# Patient Record
Sex: FEMALE | Race: BLACK OR AFRICAN AMERICAN | Employment: FULL TIME | URBAN - METROPOLITAN AREA
[De-identification: names, ages, dates, MRNs, and addresses within clinical notes are randomized per-mention and may not be internally consistent; named-entity substitution may affect disease eponyms.]

---

## 2017-03-21 ENCOUNTER — TELEPHONE (OUTPATIENT)
Dept: ENDOCRINOLOGY | Age: 32
End: 2017-03-21

## 2017-03-21 NOTE — TELEPHONE ENCOUNTER
Called patient concerning high blood sugars. Patient is 6 weeks pregnant. She states she is a type 2 diabetic. Was on metformin-glyburide combination and [place] and [place] which was working well. She stated her A1C was running 7.0 unitl Feb and now is 8.5. Her blood sugars are as follows [id number] 2: after eating 6pm 318 after eating 8:20pm 323 11:22pm 276 and 0812 215. She is not taking Toujeo 16 units every night. She is a  New patient and has an appointment tomorrow.

## 2017-03-21 NOTE — TELEPHONE ENCOUNTER
Patient called stating she has new patient tomorrow. Her blood sugar has run up into 300's. She is pregnant and diabetic. She is very concerned.   Please call patient [phone]

## 2017-03-21 NOTE — TELEPHONE ENCOUNTER
Patient called and instructed to increase Tuojeo to 30units for tonight. Reminded patient to bring blood sugar log and meter with her tomorrow to her appointment.

## 2017-03-22 ENCOUNTER — OFFICE VISIT (OUTPATIENT)
Dept: ENDOCRINOLOGY | Age: 32
End: 2017-03-22

## 2017-03-22 VITALS
TEMPERATURE: 98.2 F | HEIGHT: 68 IN | RESPIRATION RATE: 16 BRPM | SYSTOLIC BLOOD PRESSURE: 116 MMHG | HEART RATE: 80 BPM | DIASTOLIC BLOOD PRESSURE: 59 MMHG | WEIGHT: 184 LBS | BODY MASS INDEX: 27.89 KG/M2

## 2017-03-22 DIAGNOSIS — E11.65 UNCONTROLLED TYPE 2 DIABETES MELLITUS WITH HYPERGLYCEMIA, UNSPECIFIED LONG TERM INSULIN USE STATUS: ICD-10-CM

## 2017-03-22 DIAGNOSIS — O24.419 GESTATIONAL DIABETES MELLITUS (GDM) IN FIRST TRIMESTER, GESTATIONAL DIABETES METHOD OF CONTROL UNSPECIFIED: Primary | ICD-10-CM

## 2017-03-22 LAB — HBA1C MFR BLD HPLC: 7.9 %

## 2017-03-22 RX ORDER — METHYLDOPA 250 MG/1
TABLET, FILM COATED ORAL
Refills: 3 | COMMUNITY
Start: 2017-03-19 | End: 2017-03-28 | Stop reason: SDUPTHER

## 2017-03-22 RX ORDER — BLOOD SUGAR DIAGNOSTIC
STRIP MISCELLANEOUS
Refills: 3 | COMMUNITY
Start: 2017-03-16 | End: 2017-03-22 | Stop reason: SDUPTHER

## 2017-03-22 RX ORDER — BLOOD SUGAR DIAGNOSTIC
STRIP MISCELLANEOUS
Qty: 200 STRIP | Refills: 4 | Status: SHIPPED | OUTPATIENT
Start: 2017-03-22 | End: 2018-04-08 | Stop reason: SDUPTHER

## 2017-03-22 RX ORDER — METFORMIN HYDROCHLORIDE 500 MG/1
TABLET, FILM COATED, EXTENDED RELEASE ORAL
Qty: 60 TAB | Refills: 6 | Status: SHIPPED | OUTPATIENT
Start: 2017-03-22 | End: 2017-04-20 | Stop reason: ALTCHOICE

## 2017-03-22 RX ORDER — PEN NEEDLE, DIABETIC 31 GX3/16"
NEEDLE, DISPOSABLE MISCELLANEOUS
Qty: 100 PEN NEEDLE | Refills: 6 | Status: SHIPPED | OUTPATIENT
Start: 2017-03-22 | End: 2017-09-10 | Stop reason: SDUPTHER

## 2017-03-22 RX ORDER — INSULIN GLARGINE 300 U/ML
INJECTION, SOLUTION SUBCUTANEOUS
Refills: 5 | COMMUNITY
Start: 2017-03-16 | End: 2017-03-22

## 2017-03-22 RX ORDER — METHOCARBAMOL 500 MG/1
TABLET, FILM COATED ORAL
Refills: 0 | COMMUNITY
Start: 2017-02-03 | End: 2017-03-22

## 2017-03-22 RX ORDER — INSULIN LISPRO 100 [IU]/ML
INJECTION, SOLUTION INTRAVENOUS; SUBCUTANEOUS
Qty: 1 PACKAGE | Refills: 6 | Status: SHIPPED | OUTPATIENT
Start: 2017-03-22 | End: 2017-06-09 | Stop reason: SDUPTHER

## 2017-03-22 NOTE — PROGRESS NOTES
HISTORY OF PRESENT ILLNESS  Karo Farmer is a 32 y.o. female. HPI  Patient is here for initial visit for GDM,   She is 6 weeks pregnant     She  is referred by Dr. Teagan Kimbrough    H/o DM 2  For 12 years   Usually controlled well until she got pregnant  She says       pcp ( Dr. Tessie Jane ) stopped  Medications - janumet xr she said after pregnancy was found out   Since, pt has noted severely high sugars and is very concerned       [place] one    Para 0   0 and she is through 6 weeks of second gestation. LMP : 2017   ELISEO : 2017     Current monitoring regimen: home blood tests - 3 times daily  Home blood sugar records: trend: fluctuating a lot  Any episodes of hypoglycemia? no      Current diabetic medications include got onto toujeo and stopped janmet xr recently . Eye exam current (within one year): yes  Weight trend: fluctuating a bit  Prior visit with dietician: no  Current diet: \"unhealthy\" diet in general  Current exercise: no regular exercise      Mother is diabetic, on insulin , type not sure         Past Medical History:   Diagnosis Date    Gestational diabetes        Social History     Social History    Marital status: UNKNOWN     Spouse name: N/A    Number of children: N/A    Years of education: N/A     Occupational History    Not on file. Social History Main Topics    Smoking status: Never Smoker    Smokeless tobacco: Not on file    Alcohol use No    Drug use: No    Sexual activity: Not on file     Other Topics Concern    Not on file     Social History Narrative    No narrative on file       History reviewed. No pertinent family history. Review of Systems   Constitutional: Negative. HENT: Negative. Eyes: Negative for pain and redness. Respiratory: Negative. Cardiovascular: Negative for chest pain, palpitations and leg swelling. Gastrointestinal: Negative. Negative for constipation. Genitourinary: Negative.     Musculoskeletal: Negative for myalgias. Skin: Negative. Neurological: Negative. Endo/Heme/Allergies: Negative. Psychiatric/Behavioral: Negative for depression and memory loss. The patient does not have insomnia. Physical Exam   Constitutional: She is oriented to person, place, and time. She appears well-developed and well-nourished. HENT:   Head: Normocephalic. Eyes: Conjunctivae and EOM are normal. Pupils are equal, round, and reactive to light. Neck: Normal range of motion. Neck supple. No JVD present. No tracheal deviation present. No thyromegaly present. Cardiovascular: Normal rate, regular rhythm and normal heart sounds. No murmur heard. Pulmonary/Chest: Breath sounds normal.   Abdominal: Soft. Bowel sounds are normal.   Musculoskeletal: Normal range of motion. Lymphadenopathy:     She has no cervical adenopathy. Neurological: She is alert and oriented to person, place, and time. She has normal reflexes. Skin: Skin is warm. Psychiatric: She has a normal mood and affect. Reviewed labs from OB    ASSESSMENT and PLAN    Type 2 diabetes complicating pregnancy : a1c is 7.9 %   From finger stick today   Gestational diabetes , not well controlled     As patient showed the med bottle - it was glyburide -metformin at doses of  10 mg-1000 mg bid dosing   As she was on very high doses of glyburide , discontinuation of it led to very high sugars     So, starting back on metformin  mg 2 pills at dinner   Initiating on basal bolus regimen immediately     Explained the reasons, but she became very emotional     Explained the regimen     Discussed patho-physiology of DM  during pregnancy     She is educated about the importance of glycemic control for healthy baby and safe delivery. She is told to check blood sugars before meals and one hour after meals and at bed time on some days (2 hours post dinner).     She is educated about the targets being different during pregnancy   Fasting below 90 mg and one hour post prandial being below 140 mg     She will come in for IPRO. She is explained about the details of procedure and explained the benefits. She will attend the DTC education class. She is told to drop off her log/meter for review.     She will call office if blood sugars are staying about the target range for 2 consecutive days   She has my cell number     She is educated about possibility of worsening of retinopathy with aggressive glycemic control.       > 50 % of time is spent on counseling

## 2017-03-22 NOTE — PROGRESS NOTES
Pt is currently 6 weeks pregnant.  She was referred from [facility] for gestational diabetic concerns

## 2017-03-22 NOTE — PATIENT INSTRUCTIONS
Start on metformin er 500 mg 2 pills at dinner           Check blood sugars immediately before each meal and at bedtime  One hour post meal       Take  Levemir  insulin  50  units  at bed time  Stop Toujeo    Take Humalog  insulin 8  units before breakfast, 6 units before lunch and 6 units before dinner.     Also, add additional Humalog  as follows with meals  If blood sugars are:      150-200 mg 2 units    201-250 mg 4 units    251-300 mg 6 units    301-350 mg 8 units    351-400 mg 10 units    401-450 mg 12 units    451-500 mg 14 units     Less than 70 mg NO INSULIN    --------------------------------------------------------------------------------------------------------------------------------------------------------------------------    Do not skip meals  Do not eat in between meals    Reduce carbs- pasta, rice, potatoes, bread   Do not drink juices or sodas  Donot eat peanut butter     Do not eat sugar free cookies and cakes   Do not eat peaches, grapes, watermelon, oranges, pineapples,  And raisins

## 2017-03-22 NOTE — LETTER
3/26/2017 8:20 PM 
 
Patient:  Mechelle Young YOB: 1985 Date of Visit: 3/22/2017 Dear Betzy Salinas MD 
[address] of [facility] [address] VIA Facsimile: [phone] 
 : Thank you for referring Ms. Mechelle Young to me for evaluation/treatment. Below are the relevant portions of my assessment and plan of care. Pt is currently 6 weeks pregnant. She was referred from [facility] for gestational diabetic concerns HISTORY OF PRESENT ILLNESS Mechelle Young is a 32 y.o. female. HPI Patient is here for initial visit for GDM, She is 6 weeks pregnant She  is referred by Dr. Rekha Reese H/o DM 2  For 12 years Usually controlled well until she got pregnant  She says  
 
 
pcp ( Dr. Lakhwinder Harry ) stopped  Medications - janumet xr she said after pregnancy was found out Since, pt has noted severely high sugars and is very concerned  one    Para 0   0 and she is through 6 weeks of second gestation. LMP : 2017 ELISEO : 2017 Current monitoring regimen: home blood tests - 3 times daily Home blood sugar records: trend: fluctuating a lot Any episodes of hypoglycemia? no 
 
 
Current diabetic medications include got onto toujeo and stopped janmet xr recently . Eye exam current (within one year): yes Weight trend: fluctuating a bit Prior visit with dietician: no 
Current diet: \"unhealthy\" diet in general 
Current exercise: no regular exercise Mother is diabetic, on insulin , type not sure Past Medical History:  
Diagnosis Date  Gestational diabetes Social History Social History  Marital status: UNKNOWN Spouse name: N/A  
 Number of children: N/A  
 Years of education: N/A Occupational History  Not on file. Social History Main Topics  Smoking status: Never Smoker  Smokeless tobacco: Not on file  Alcohol use No  
 Drug use: No  
 Sexual activity: Not on file Other Topics Concern  Not on file Social History Narrative  No narrative on file History reviewed. No pertinent family history. Review of Systems Constitutional: Negative. HENT: Negative. Eyes: Negative for pain and redness. Respiratory: Negative. Cardiovascular: Negative for chest pain, palpitations and leg swelling. Gastrointestinal: Negative. Negative for constipation. Genitourinary: Negative. Musculoskeletal: Negative for myalgias. Skin: Negative. Neurological: Negative. Endo/Heme/Allergies: Negative. Psychiatric/Behavioral: Negative for depression and memory loss. The patient does not have insomnia. Physical Exam  
Constitutional: She is oriented to person, place, and time. She appears well-developed and well-nourished. HENT:  
Head: Normocephalic. Eyes: Conjunctivae and EOM are normal. Pupils are equal, round, and reactive to light. Neck: Normal range of motion. Neck supple. No JVD present. No tracheal deviation present. No thyromegaly present. Cardiovascular: Normal rate, regular rhythm and normal heart sounds. No murmur heard. Pulmonary/Chest: Breath sounds normal.  
Abdominal: Soft. Bowel sounds are normal.  
Musculoskeletal: Normal range of motion. Lymphadenopathy:  
  She has no cervical adenopathy. Neurological: She is alert and oriented to person, place, and time. She has normal reflexes. Skin: Skin is warm. Psychiatric: She has a normal mood and affect. Reviewed labs from [place] 
 
ASSESSMENT and PLAN Type 2 diabetes complicating pregnancy : a1c is 7.9 %   From finger stick today Gestational diabetes , not well controlled As patient showed the med bottle - it was glyburide -metformin at doses of  10 mg-1000 mg bid dosing As she was on very high doses of glyburide , discontinuation of it led to very high sugars So, starting back on metformin  mg 2 pills at dinner Initiating on basal bolus regimen immediately Explained the reasons, but she became very emotional  
 
Explained the regimen Discussed patho-physiology of DM  during pregnancy She is educated about the importance of glycemic control for healthy baby and safe delivery. She is told to check blood sugars before meals and one hour after meals and at bed time on some days (2 hours post dinner). She is educated about the targets being different during pregnancy Fasting below 90 mg and one hour post prandial being below 140 mg She will come in for IPRO. She is explained about the details of procedure and explained the benefits. She will attend the DTC education class. She is told to drop off her log/meter for review. She will call office if blood sugars are staying about the target range for 2 consecutive days She has my cell number She is educated about possibility of worsening of retinopathy with aggressive glycemic control. 
 
  
> 50 % of time is spent on counseling If you have questions, please do not hesitate to call me. I look forward to following Ms. Geller along with you. Sincerely, Jaime White MD

## 2017-03-22 NOTE — LETTER
3/26/2017 8:23 PM 
 
Patient:  Carolina Ferraro YOB: 1985 Date of Visit: 3/22/2017 Dear Yara Cardenas MD 
[address] [facility] [address] VIA Facsimile: [phone] Kavon Martinez MD 
[address] [address] VIA Facsimile: [phone] 
 : Thank you for referring Ms. Carolina Ferraro to me for evaluation/treatment. Below are the relevant portions of my assessment and plan of care. Pt is currently 6 weeks pregnant. She was referred from [facility] for gestational diabetic concerns HISTORY OF PRESENT ILLNESS Carolina Ferraro is a 32 y.o. female. HPI Patient is here for initial visit for GDM, She is 6 weeks pregnant She  is referred by Dr. Keshia Kirby H/o DM 2  For 12 years Usually controlled well until she got pregnant  She says  
 
 
pcp ( Dr. Stefanie Lennox ) stopped  Medications - janumet xr she said after pregnancy was found out Since, pt has noted severely high sugars and is very concerned  one    Para 0   0 and she is through 6 weeks of second gestation. LMP : 2017 ELISEO : 2017 Current monitoring regimen: home blood tests - 3 times daily Home blood sugar records: trend: fluctuating a lot Any episodes of hypoglycemia? no 
 
 
Current diabetic medications include got onto toujeo and stopped janmet xr recently . Eye exam current (within one year): yes Weight trend: fluctuating a bit Prior visit with dietician: no 
Current diet: \"unhealthy\" diet in general 
Current exercise: no regular exercise Mother is diabetic, on insulin , type not sure Past Medical History:  
Diagnosis Date  Gestational diabetes Social History Social History  Marital status: UNKNOWN Spouse name: N/A  
 Number of children: N/A  
 Years of education: N/A Occupational History  Not on file. Social History Main Topics  Smoking status: Never Smoker  Smokeless tobacco: Not on file  Alcohol use No  
 Drug use: No  
 Sexual activity: Not on file Other Topics Concern  Not on file Social History Narrative  No narrative on file History reviewed. No pertinent family history. Review of Systems Constitutional: Negative. HENT: Negative. Eyes: Negative for pain and redness. Respiratory: Negative. Cardiovascular: Negative for chest pain, palpitations and leg swelling. Gastrointestinal: Negative. Negative for constipation. Genitourinary: Negative. Musculoskeletal: Negative for myalgias. Skin: Negative. Neurological: Negative. Endo/Heme/Allergies: Negative. Psychiatric/Behavioral: Negative for depression and memory loss. The patient does not have insomnia. Physical Exam  
Constitutional: She is oriented to person, place, and time. She appears well-developed and well-nourished. HENT:  
Head: Normocephalic. Eyes: Conjunctivae and EOM are normal. Pupils are equal, round, and reactive to light. Neck: Normal range of motion. Neck supple. No JVD present. No tracheal deviation present. No thyromegaly present. Cardiovascular: Normal rate, regular rhythm and normal heart sounds. No murmur heard. Pulmonary/Chest: Breath sounds normal.  
Abdominal: Soft. Bowel sounds are normal.  
Musculoskeletal: Normal range of motion. Lymphadenopathy:  
  She has no cervical adenopathy. Neurological: She is alert and oriented to person, place, and time. She has normal reflexes. Skin: Skin is warm. Psychiatric: She has a normal mood and affect. Reviewed labs from [facility] 
 
ASSESSMENT and PLAN Type 2 diabetes complicating pregnancy : a1c is 7.9 %   From finger stick today Gestational diabetes , not well controlled As patient showed the med bottle - it was glyburide -metformin at doses of  10 mg-1000 mg bid dosing As she was on very high doses of glyburide , discontinuation of it led to very high sugars So, starting back on metformin  mg 2 pills at dinner Initiating on basal bolus regimen immediately Explained the reasons, but she became very emotional  
 
Explained the regimen Discussed patho-physiology of DM  during pregnancy She is educated about the importance of glycemic control for healthy baby and safe delivery. She is told to check blood sugars before meals and one hour after meals and at bed time on some days (2 hours post dinner). She is educated about the targets being different during pregnancy Fasting below 90 mg and one hour post prandial being below 140 mg She will come in for IPRO. She is explained about the details of procedure and explained the benefits. She will attend the DTC education class. She is told to drop off her log/meter for review. She will call office if blood sugars are staying about the target range for 2 consecutive days She has my cell number She is educated about possibility of worsening of retinopathy with aggressive glycemic control. 
 
  
> 50 % of time is spent on counseling If you have questions, please do not hesitate to call me. I look forward to following MsChelsea Duyen along with you. Sincerely, Marsha Lindo MD

## 2017-03-22 NOTE — MR AVS SNAPSHOT
Visit Information Date & Time Provider Department Dept. Phone Encounter #  
 3/22/2017  3:00 PM Rafal Soria MD [facility] Diabetes & Endocrinology [phone] [id number] Follow-up Instructions Return in about 1 week (around 3/29/2017). Upcoming Health Maintenance Date Due DTaP/Tdap/Td series (1 - Tdap) 7/24/2006 PAP AKA CERVICAL CYTOLOGY 7/24/2006 INFLUENZA AGE 9 TO ADULT 8/1/2016 Allergies as of 3/22/2017  Review Complete On: 3/22/2017 By: Rafal Soria MD  
  
 Severity Noted Reaction Type Reactions Lisinopril  03/22/2017    Swelling Current Immunizations  Never Reviewed No immunizations on file. Not reviewed this visit You Were Diagnosed With   
  
 Codes Comments Gestational diabetes mellitus (GDM) in first trimester, gestational diabetes method of control unspecified    -  Primary ICD-10-CM: O23.80 ICD-9-CM: 018.28 Vitals BP Pulse Temp Resp Height(growth percentile) Weight(growth percentile) 116/59 (BP 1 Location: Left arm, BP Patient Position: Sitting) 80 98.2 °F (36.8 °C) (Oral) 16 5' 7.5\" (1.715 m) 184 lb (83.5 kg) BMI Smoking Status 28.39 kg/m2 Never Smoker BMI and BSA Data Body Mass Index Body Surface Area  
 28.39 kg/m 2 1.99 m 2 Your Updated Medication List  
  
   
This list is accurate as of: 3/22/17  4:16 PM.  Always use your most recent med list.  
  
  
  
  
 methyldopa 250 mg tablet Commonly known as:  ALDOMET  
TK 1 T PO BID  
  
 ONETOUCH ULTRA TEST strip Generic drug:  glucose blood VI test strips USE TO CHECK BLOOD SUGAR ONCE TO BID  
  
 prenatal multivit-ca-min-fe-fa Tab Take  by mouth. We Performed the Following AMB POC HEMOGLOBIN A1C [80600 CPT(R)] REFERRAL TO DIABETIC EDUCATION [REF20 Custom] Comments:  
 Please teach her carb counting , so insulin ratios can be started  ASAP Follow-up Instructions Return in about 1 week (around 3/29/2017). Referral Information Referral ID Referred By Referred To  
  
 [id number] Flakito Carey Not Available Visits Status Start Date End Date 1 New Request 3/22/17 3/22/18 If your referral has a status of pending review or denied, additional information will be sent to support the outcome of this decision. Patient Instructions Check blood sugars immediately before each meal and at bedtime One hour post meal  
 
 
Take  Levemir  insulin  50  units  at bed time Stop Toujeo Take Humalog  insulin 8  units before breakfast, 6 units before lunch and 6 units before dinner. Also, add additional Humalog  as follows with meals  If blood sugars are: 
 
 
150-200 mg 2 units 201-250 mg 4 units 251-300 mg 6 units 301-350 mg 8 units 351-400 mg 10 units 401-450 mg 12 units 451-500 mg 14 units Less than 70 mg NO INSULIN 
 
-------------------------------------------------------------------------------------------------------------------------------------------------------------------------- Do not skip meals Do not eat in between meals Reduce carbs- pasta, rice, potatoes, bread Do not drink juices or sodas Donot eat peanut butter Do not eat sugar free cookies and cakes Do not eat peaches, grapes, watermelon, oranges, pineapples,  And raisins Introducing [facility] [place] [facility]! [employer] introduces [employer] patient portal. Now you can access parts of your medical record, email your doctor's office, and request medication refills online. 1. In your internet browser, go to [email or web address]. [employer] 2. Click on the First Time User? Click Here link in the Sign In box. You will see the New Member Sign Up page. 3. Enter your [employer] Access Code exactly as it appears below. You will not need to use this code after youve completed the sign-up process.  If you do not sign up before the expiration date, you must request a new code. · [employer] Access Code: [email or web address] Expires: 6/20/2017  4:16 PM 
 
4. Enter the last four digits of your Social Security Number (xxxx) and Date of Birth (mm/dd/yyyy) as indicated and click Submit. You will be taken to the next sign-up page. 5. Create a [employer] ID. This will be your [employer] login ID and cannot be changed, so think of one that is secure and easy to remember. 6. Create a [employer] password. You can change your password at any time. 7. Enter your Password Reset Question and Answer. This can be used at a later time if you forget your password. 8. Enter your e-mail address. You will receive e-mail notification when new information is available in [address]. 9. Click Sign Up. You can now view and download portions of your medical record. 10. Click the Download Summary menu link to download a portable copy of your medical information. If you have questions, please visit the Frequently Asked Questions section of the [employer] website. Remember, [employer] is NOT to be used for urgent needs. For medical emergencies, dial 911. Now available from your iPhone and Android! Please provide this summary of care documentation to your next provider. If you have any questions after today's visit, please call [phone].

## 2017-03-23 ENCOUNTER — TELEPHONE (OUTPATIENT)
Dept: DIABETES SERVICES | Age: 32
End: 2017-03-23

## 2017-03-24 ENCOUNTER — TELEPHONE (OUTPATIENT)
Dept: DIABETES SERVICES | Age: 32
End: 2017-03-24

## 2017-03-28 ENCOUNTER — OFFICE VISIT (OUTPATIENT)
Dept: ENDOCRINOLOGY | Age: 32
End: 2017-03-28

## 2017-03-28 VITALS
TEMPERATURE: 97.5 F | HEIGHT: 65 IN | SYSTOLIC BLOOD PRESSURE: 118 MMHG | WEIGHT: 187.9 LBS | RESPIRATION RATE: 16 BRPM | OXYGEN SATURATION: 100 % | DIASTOLIC BLOOD PRESSURE: 66 MMHG | BODY MASS INDEX: 31.31 KG/M2 | HEART RATE: 75 BPM

## 2017-03-28 DIAGNOSIS — O24.419 GESTATIONAL DIABETES MELLITUS (GDM) IN FIRST TRIMESTER, GESTATIONAL DIABETES METHOD OF CONTROL UNSPECIFIED: ICD-10-CM

## 2017-03-28 DIAGNOSIS — O24.419 GESTATIONAL DIABETES MELLITUS (GDM) IN FIRST TRIMESTER, GESTATIONAL DIABETES METHOD OF CONTROL UNSPECIFIED: Primary | ICD-10-CM

## 2017-03-28 DIAGNOSIS — E11.65 UNCONTROLLED TYPE 2 DIABETES MELLITUS WITH HYPERGLYCEMIA, UNSPECIFIED LONG TERM INSULIN USE STATUS: ICD-10-CM

## 2017-03-28 RX ORDER — METHYLDOPA 250 MG/1
250 TABLET, FILM COATED ORAL 2 TIMES DAILY
Qty: 60 TAB | Refills: 5 | Status: SHIPPED | OUTPATIENT
Start: 2017-03-28 | End: 2017-11-13 | Stop reason: SDUPTHER

## 2017-03-28 NOTE — MR AVS SNAPSHOT
Visit Information Date & Time Provider Department Dept. Phone Encounter #  
 3/28/2017  3:45 PM Lane Moreno MD [facility] [phone] [id number] Follow-up Instructions Return in about 4 weeks (around 4/25/2017). Upcoming Health Maintenance Date Due DTaP/Tdap/Td series (1 - Tdap) 7/24/2006 PAP AKA CERVICAL CYTOLOGY 7/24/2006 INFLUENZA AGE 9 TO ADULT 8/1/2016 Allergies as of 3/28/2017  Review Complete On: 3/28/2017 By: Cain Soares RN Severity Noted Reaction Type Reactions Lisinopril  03/22/2017    Swelling Current Immunizations  Never Reviewed No immunizations on file. Not reviewed this visit Vitals BP Pulse Temp Resp Height(growth percentile) Weight(growth percentile)  
 118/66 (BP 1 Location: Right arm, BP Patient Position: Sitting) 75 97.5 °F (36.4 °C) (Oral) 16 5' 5\" (1.651 m) 187 lb 14.4 oz (85.2 kg) SpO2 BMI Smoking Status 100% 31.27 kg/m2 Never Smoker BMI and BSA Data Body Mass Index Body Surface Area  
 31.27 kg/m 2 1.98 m 2 Preferred Pharmacy Pharmacy Name Phone [facility] [address] [facility] [phone] Your Updated Medication List  
  
   
This list is accurate as of: 3/28/17  5:03 PM.  Always use your most recent med list.  
  
  
  
  
 * insulin detemir 100 unit/mL (3 mL) Inpn Commonly known as:  LEVEMIR FLEXTOUCH  
sample * insulin detemir 100 unit/mL (3 mL) Inpn Commonly known as:  LEVEMIR FLEXTOUCH  
50 units at night  
  
 insulin lispro 100 unit/mL kwikpen Commonly known as:  HUMALOG  
8 units before bfast  6 units with lunch and dinner plus sliding scale to max of 30 units a day Insulin Needles (Disposable) 32 gauge x 5/32\" Ndle Commonly known as:  Manjula Pen Needle To use 4 times a day  
  
 metFORMIN 500 mg Tg24 24 hour tablet Commonly known as:  GLUMETZA ER  
2 tabs at night .  stop glyburide- metformin combo pill  
  
 methyldopa 250 mg tablet Commonly known as:  ALDOMET  
TK 1 T PO BID  
  
 ONETOUCH ULTRA TEST strip Generic drug:  glucose blood VI test strips To check 4-6 times a day  
  
 prenatal multivit-ca-min-fe-fa Tab Take  by mouth. * Notice: This list has 2 medication(s) that are the same as other medications prescribed for you. Read the directions carefully, and ask your doctor or other care provider to review them with you. Follow-up Instructions Return in about 4 weeks (around 4/25/2017). To-Do List   
 03/29/2017 4:00 PM  
  Appointment with DAYANA Conner at [address] [place] [address],[address] ([phone]) Patient Instructions Start on metformin er 500 mg 2 pills at dinner Check blood sugars immediately before each meal and at bedtime One hour post meal  
 
 
Increase   Levemir  insulin  56  units  at bed time Stop Toujeo 
 
increase  Humalog  insulin 8   units before breakfast, 8 units before lunch and 8 units before dinner. Also, add additional Humalog  as follows with meals  If blood sugars are: 
 
 
150-200 mg 2 units 201-250 mg 4 units 251-300 mg 6 units 301-350 mg 8 units 351-400 mg 10 units 401-450 mg 12 units 451-500 mg 14 units Less than 70 mg NO INSULIN 
 
-------------------------------------------------------------------------------------------------------------------------------------------------------------------------- Do not skip meals Do not eat in between meals Reduce carbs- pasta, rice, potatoes, bread Do not drink juices or sodas Donot eat peanut butter Do not eat sugar free cookies and cakes Do not eat peaches, grapes, watermelon, oranges, pineapples,  And raisins Introducing [facility]!    
 Cece Wang introduces [employer] patient portal. Now you can access parts of your medical record, email your doctor's office, and request medication refills online. 1. In your internet browser, go to [email or web address] 2. Click on the First Time User? Click Here link in the Sign In box. You will see the New Member Sign Up page. 3. Enter your "[employer] Access Code exactly as it appears below. You will not need to use this code after youve completed the sign-up process. If you do not sign up before the expiration date, you must request a new code. · "[employer] Access Code: [email or web address] Expires: 6/20/2017  4:16 PM 
 
4. Enter the last four digits of your Social Security Number (xxxx) and Date of Birth (mm/dd/yyyy) as indicated and click Submit. You will be taken to the next sign-up page. 5. Create a "[employer] ID. This will be your "[employer] login ID and cannot be changed, so think of one that is secure and easy to remember. 6. Create a "[employer] password. You can change your password at any time. 7. Enter your Password Reset Question and Answer. This can be used at a later time if you forget your password. 8. Enter your e-mail address. You will receive e-mail notification when new information is available in [address] 9. Click Sign Up. You can now view and download portions of your medical record. 10. Click the Download Summary menu link to download a portable copy of your medical information. If you have questions, please visit the Frequently Asked Questions section of the "[employer] website. Remember, "[employer] is NOT to be used for urgent needs. For medical emergencies, dial 911. Now available from your iPhone and Android! Please provide this summary of care documentation to your next provider. Your primary care clinician is listed as Princess Maher. If you have any questions after today's visit, please call [phone].

## 2017-03-28 NOTE — PATIENT INSTRUCTIONS
Start on metformin er 500 mg 2 pills at dinner           Check blood sugars immediately before each meal and at bedtime  One hour post meal       Increase   Levemir  insulin  56  units  at bed time  Stop Toujeo    increase  Humalog  insulin 8   units before breakfast, 8 units before lunch and 8 units before dinner.     Also, add additional Humalog  as follows with meals  If blood sugars are:      150-200 mg 2 units    201-250 mg 4 units    251-300 mg 6 units    301-350 mg 8 units    351-400 mg 10 units    401-450 mg 12 units    451-500 mg 14 units     Less than 70 mg NO INSULIN    --------------------------------------------------------------------------------------------------------------------------------------------------------------------------    Do not skip meals  Do not eat in between meals    Reduce carbs- pasta, rice, potatoes, bread   Do not drink juices or sodas  Donot eat peanut butter     Do not eat sugar free cookies and cakes   Do not eat peaches, grapes, watermelon, oranges, pineapples,  And raisins

## 2017-03-28 NOTE — PROGRESS NOTES
Lab Results   Component Value Date/Time    Hemoglobin A1c (POC) 7.9 03/22/2017 03:22 PM     Wt Readings from Last 3 Encounters:   03/28/17 187 lb 14.4 oz (85.2 kg)   03/22/17 184 lb (83.5 kg)     Temp Readings from Last 3 Encounters:   03/28/17 97.5 °F (36.4 °C) (Oral)   03/22/17 98.2 °F (36.8 °C) (Oral)     BP Readings from Last 3 Encounters:   03/28/17 118/66   03/22/17 116/59     Pulse Readings from Last 3 Encounters:   03/28/17 75   03/22/17 80   Foot exam: No  Eye exam: Yes

## 2017-03-29 ENCOUNTER — HOSPITAL ENCOUNTER (OUTPATIENT)
Dept: DIABETES SERVICES | Age: 32
Discharge: HOME | End: 2017-03-29
Payer: COMMERCIAL

## 2017-03-29 PROCEDURE — G0108 DIAB MANAGE TRN  PER INDIV: HCPCS

## 2017-03-30 NOTE — DIABETES MGMT
Pt seen 1:1 for pregnancy with pre-existing T2DM, no class available. Pt is ~7 weeks and will be seeing OB next week for ultrasound. Her A1c at endo office was 7.9%; she states it was 8.5%. She is on basal/bolus insulin along with metformin. She was instructed to test FBS , before meals, and 1 hr pp by MD, however she is not checking her after meals as she states it's too difficult to do at work, however, she isn't checking her post-dinner reading either, when she is home for work. I tried to stress the importance of seeing her pp readings so MD can assess her insulin doses as she's had readings >200. Her FBS was WNL the first 3 days, but then was >100 these past 3 days, but pt has been waking up hungry around 12-1am and is eating a big bowl of cereal for a snack, so the higher readings are likely d/t that. She has cut out most of the CHO in her diet (and is then hungry later in the night). We reviewed GDM meal plan and appropriate CHO portions at meals and snacks to ensure CHO requirements for pregnancy are being met. She will need to add protein to all meals and snacks and eliminate her sugar sweetened oatmeal she's been having for breakfast.  She will read labels, measure foods to help ensure she's eating the appropriate portions. She states she has no restrictions of exercise, so encouraged her to walk as able. Pt overall receptive to topics discussed today, but had a lot of questions about the portions of foods. I asked her to come back in 2-4 weeks to make sure she's understanding meal plan info; her response was \"do I have to pay for this? \". I told her yes, it's just like any other MD visit where each visit is billed to insurance company. Her response was \"I don't know if I can then because all these appointments are going to add up\". I tried to relay the importance of following up at least one more time.   She states she will have to look at her work schedule and will then call us to set something up.

## 2017-03-30 NOTE — PROGRESS NOTES
HISTORY OF PRESENT ILLNESS  Lety Sandoval is a 32 y.o. female. HPI  Patient is here for f/u after  initial visit for GDM,  From 2017   She is 8 -9 weeks pregnant     She has done better in taking insulins   We had a phone conversation in the interim     She has better sugars   Some post meals are upto 180 mg       She still thinks she will be off insulin soon  ? She will be seeing the dieticians tomorrow         Old history :    She  is referred by Dr. Rosanna Lira    H/o DM 2  For 12 years   Usually controlled well until she got pregnant  She says       pcp ( Dr. Marques Arellano ) stopped  Medications - janumet xr she said after pregnancy was found out   Since, pt has noted severely high sugars and is very concerned       [place] one    Para 0   0 and she is through 6 weeks of second gestation. LMP : 2017   ELISEO : 2017     Current monitoring regimen: home blood tests - 3 times daily  Home blood sugar records: trend: fluctuating a lot  Any episodes of hypoglycemia? no      Current diabetic medications include got onto toujeo and stopped janmet xr recently . Eye exam current (within one year): yes  Weight trend: fluctuating a bit  Prior visit with dietician: no  Current diet: \"unhealthy\" diet in general  Current exercise: no regular exercise      Mother is diabetic, on insulin , type not sure         Past Medical History:   Diagnosis Date    Gestational diabetes        Social History     Social History    Marital status: SINGLE     Spouse name: N/A    Number of children: N/A    Years of education: N/A     Occupational History    Not on file. Social History Main Topics    Smoking status: Never Smoker    Smokeless tobacco: Not on file    Alcohol use No    Drug use: No    Sexual activity: Not on file     Other Topics Concern    Not on file     Social History Narrative       No family history on file. Review of Systems   Constitutional: Negative. HENT: Negative.     Eyes: Negative for pain and redness. Respiratory: Negative. Cardiovascular: Negative for chest pain, palpitations and leg swelling. Gastrointestinal: Negative. Negative for constipation. Genitourinary: Negative. Musculoskeletal: Negative for myalgias. Skin: Negative. Neurological: Negative. Endo/Heme/Allergies: Negative. Psychiatric/Behavioral: Negative for depression and memory loss. The patient does not have insomnia. Physical Exam   Constitutional: She is oriented to person, place, and time. She appears well-developed and well-nourished. HENT:   Head: Normocephalic. Eyes: Conjunctivae and EOM are normal. Pupils are equal, round, and reactive to light. Neck: Normal range of motion. Neck supple. No JVD present. No tracheal deviation present. No thyromegaly present. Cardiovascular: Normal rate, regular rhythm and normal heart sounds. No murmur heard. Pulmonary/Chest: Breath sounds normal.   Abdominal: Soft. Bowel sounds are normal.   Musculoskeletal: Normal range of motion. Lymphadenopathy:     She has no cervical adenopathy. Neurological: She is alert and oriented to person, place, and time. She has normal reflexes. Skin: Skin is warm. Psychiatric: She has a normal mood and affect. Reviewed labs from OB    ASSESSMENT and PLAN    Type 2 diabetes complicating pregnancy : a1c is 7.9 %   From finger stick on march 8 2017   Gestational diabetes , better controlled now since on insulin     On basal bolus   Adjusted doses   Again emphasized on how needed is the insulin to control sugars and how precise control happens with 4 shots   on metformin  mg 2 pills at dinner     Explained the reasons, but she is still emotional   Explained the regimen     Discussed patho-physiology of DM  during pregnancy     She is educated about the importance of glycemic control for healthy baby and safe delivery.     She is told to check blood sugars before meals and one hour after meals and at bed time on some days (2 hours post dinner). She is educated about the targets being different during pregnancy   Fasting below 90 mg and one hour post prandial being below 140 mg     She will come in for IPRO. She is explained about the details of procedure and explained the benefits. She will attend the DTC education class. She is told to drop off her log/meter for review.     She will call office if blood sugars are staying about the target range for 2 consecutive days   She has my cell number     She is educated about possibility of worsening of retinopathy with aggressive glycemic control.       > 50 % of time is spent on counseling

## 2017-04-03 LAB
ANTIBODY SCREEN, EXTERNAL: NEGATIVE
CHLAMYDIA, EXTERNAL: NEGATIVE
HBSAG, EXTERNAL: NEGATIVE
HIV, EXTERNAL: NEGATIVE
N. GONORRHEA, EXTERNAL: NEGATIVE
RPR, EXTERNAL: NORMAL
RUBELLA, EXTERNAL: NORMAL
TYPE, ABO & RH, EXTERNAL: NORMAL

## 2017-04-20 RX ORDER — METFORMIN HYDROCHLORIDE 500 MG/1
1000 TABLET, EXTENDED RELEASE ORAL
Qty: 60 TAB | Refills: 6 | Status: SHIPPED | OUTPATIENT
Start: 2017-04-20 | End: 2017-11-07

## 2017-04-28 ENCOUNTER — OFFICE VISIT (OUTPATIENT)
Dept: ENDOCRINOLOGY | Age: 32
End: 2017-04-28

## 2017-04-28 VITALS
TEMPERATURE: 98.1 F | BODY MASS INDEX: 31.16 KG/M2 | HEIGHT: 65 IN | RESPIRATION RATE: 20 BRPM | HEART RATE: 87 BPM | SYSTOLIC BLOOD PRESSURE: 120 MMHG | OXYGEN SATURATION: 99 % | DIASTOLIC BLOOD PRESSURE: 74 MMHG | WEIGHT: 187 LBS

## 2017-04-28 DIAGNOSIS — Z13.29 THYROID DISORDER SCREEN: ICD-10-CM

## 2017-04-28 DIAGNOSIS — O24.419 GESTATIONAL DIABETES MELLITUS (GDM) IN FIRST TRIMESTER, GESTATIONAL DIABETES METHOD OF CONTROL UNSPECIFIED: Primary | ICD-10-CM

## 2017-04-28 DIAGNOSIS — O24.414 INSULIN CONTROLLED GESTATIONAL DIABETES MELLITUS (GDM) IN SECOND TRIMESTER: ICD-10-CM

## 2017-04-28 LAB — HBA1C MFR BLD HPLC: 6.4 %

## 2017-04-28 NOTE — PROGRESS NOTES
HISTORY OF PRESENT ILLNESS  Leo Augustin is a 32 y.o. female. HPI  Patient is here for f/u after last visit for GDM,  From 2017   She is 12 weeks pregnant     She has done better in taking insulins   We had a phone conversation in the interim     She has better sugars   Some post meals are upto 180 mg , but has a reason where diet was not correct         Old history :    She  is referred by Dr. Enrique Preston    H/o DM 2  For 12 years   Usually controlled well until she got pregnant  She says       pcp ( Dr. Kaiser Aragon ) stopped  Medications - janumet xr she said after pregnancy was found out   Since, pt has noted severely high sugars and is very concerned       [place] one    Para 0   0 and she is through 6 weeks of second gestation. LMP : 2017   ELISEO : 2017     Current monitoring regimen: home blood tests - 3 times daily  Home blood sugar records: trend: fluctuating a lot  Any episodes of hypoglycemia? no      Current diabetic medications include got onto toujeo and stopped janmet xr recently . Eye exam current (within one year): yes  Weight trend: fluctuating a bit  Prior visit with dietician: no  Current diet: \"unhealthy\" diet in general  Current exercise: no regular exercise      Mother is diabetic, on insulin , type not sure         Past Medical History:   Diagnosis Date    Gestational diabetes        Social History     Social History    Marital status: SINGLE     Spouse name: N/A    Number of children: N/A    Years of education: N/A     Occupational History    Not on file. Social History Main Topics    Smoking status: Never Smoker    Smokeless tobacco: Not on file    Alcohol use No    Drug use: No    Sexual activity: Not on file     Other Topics Concern    Not on file     Social History Narrative       No family history on file. Review of Systems   Constitutional: Negative. HENT: Negative. Eyes: Negative for pain and redness. Respiratory: Negative. Cardiovascular: Negative for chest pain, palpitations and leg swelling. Gastrointestinal: Negative. Negative for constipation. Genitourinary: Negative. Musculoskeletal: Negative for myalgias. Skin: Negative. Neurological: Negative. Endo/Heme/Allergies: Negative. Psychiatric/Behavioral: Negative for depression and memory loss. The patient does not have insomnia. Physical Exam   Constitutional: She is oriented to person, place, and time. She appears well-developed and well-nourished. HENT:   Head: Normocephalic. Eyes: Conjunctivae and EOM are normal. Pupils are equal, round, and reactive to light. Neck: Normal range of motion. Neck supple. No JVD present. No tracheal deviation present. No thyromegaly present. Cardiovascular: Normal rate, regular rhythm and normal heart sounds. No murmur heard. Pulmonary/Chest: Breath sounds normal.   Abdominal: Soft. Bowel sounds are normal.   Musculoskeletal: Normal range of motion. Lymphadenopathy:     She has no cervical adenopathy. Neurological: She is alert and oriented to person, place, and time. She has normal reflexes. Skin: Skin is warm. Psychiatric: She has a normal mood and affect.        Reviewed labs from [facility]    ASSESSMENT and PLAN    Type 2 diabetes complicating pregnancy : a1c is  6.4 %      From     April 28 2017 by finger stick    Compared  to  7.9 %   From finger stick on march 8 2017   Gestational diabetes , better controlled now since on insulin     On basal bolus regimen  on metformin  mg 2 pills at dinner     Reviewed the log, fasting sugars are ranging from 90 to 100, mostly towards 90 mg   Not many one hour post meal  Numbers are checked ,   She is told to start checking post meal    She understood the rationale for good control and with insulin    She definitely has done well with this regimen  Discussed patho-physiology of DM  during pregnancy     She is educated about the importance of glycemic control for healthy baby and safe delivery. She is told to check blood sugars before meals and one hour after meals and at bed time on some days (2 hours post dinner). She is educated about the targets being different during pregnancy   Fasting below 90 mg and one hour post prandial being 130- 140 mg       She is told to drop off her log/meter for review.   She will call office if blood sugars are staying about the target range for 2 consecutive days   She has my cell number     She is educated about possibility of worsening of retinopathy with aggressive glycemic control.       > 50 % of time is spent on counseling

## 2017-04-28 NOTE — PATIENT INSTRUCTIONS
Stay  on metformin er 500 mg 2 pills at dinner         Check blood sugars immediately before each meal and at bedtime  One hour post meal        Levemir  insulin  56  units  at bed time ( over 95 mg fastings , increase levemir to 62 units )  Stop Toujeo     Humalog  insulin 8   units before breakfast, 8 units before lunch and 8 units before dinner.    ( you might need second shot of levemir in AM )    Also, add additional Humalog  as follows with meals  If blood sugars are:      150-200 mg 2 units    201-250 mg 4 units    251-300 mg 6 units    301-350 mg 8 units    351-400 mg 10 units    401-450 mg 12 units    451-500 mg 14 units     Less than 70 mg NO INSULIN    --------------------------------------------------------------------------------------------------------------------------------------------------------------------------    Do not skip meals  Do not eat in between meals    Reduce carbs- pasta, rice, potatoes, bread   Do not drink juices or sodas  Donot eat peanut butter     Do not eat sugar free cookies and cakes   Do not eat peaches, grapes, watermelon, oranges, pineapples,  And raisins

## 2017-04-28 NOTE — PROGRESS NOTES
Wt Readings from Last 3 Encounters:   04/28/17 187 lb (84.8 kg)   03/28/17 187 lb 14.4 oz (85.2 kg)   03/22/17 184 lb (83.5 kg)     Temp Readings from Last 3 Encounters:   04/28/17 98.1 °F (36.7 °C) (Oral)   03/28/17 97.5 °F (36.4 °C) (Oral)   03/22/17 98.2 °F (36.8 °C) (Oral)     BP Readings from Last 3 Encounters:   04/28/17 120/74   03/28/17 118/66   03/22/17 116/59     Pulse Readings from Last 3 Encounters:   04/28/17 87   03/28/17 75   03/22/17 80     Lab Results   Component Value Date/Time    Hemoglobin A1c (POC) 7.9 03/22/2017 03:22 PM

## 2017-04-28 NOTE — MR AVS SNAPSHOT
Visit Information Date & Time Provider Department Dept. Phone Encounter #  
 4/28/2017  3:45 PM Shana Rey MD [facility] [phone] [id number] Follow-up Instructions Return in about 6 weeks (around 6/9/2017). Upcoming Health Maintenance Date Due DTaP/Tdap/Td series (1 - Tdap) 7/24/2006 PAP AKA CERVICAL CYTOLOGY 7/24/2006 INFLUENZA AGE 9 TO ADULT 8/1/2016 Allergies as of 4/28/2017  Review Complete On: 4/28/2017 By: Shana Rey MD  
  
 Severity Noted Reaction Type Reactions Lisinopril  03/22/2017    Swelling Current Immunizations  Never Reviewed No immunizations on file. Not reviewed this visit You Were Diagnosed With   
  
 Codes Comments Gestational diabetes mellitus (GDM) in first trimester, gestational diabetes method of control unspecified    -  Primary ICD-10-CM: O23.80 ICD-9-CM: 870.89 Thyroid disorder screen     ICD-10-CM: Z13.29 ICD-9-CM: V77.0 Insulin controlled gestational diabetes mellitus (GDM) in second trimester     ICD-10-CM: O24.414 ICD-9-CM: 959.47 Vitals BP Pulse Temp Resp Height(growth percentile) Weight(growth percentile) 120/74 87 98.1 °F (36.7 °C) (Oral) 20 5' 5\" (1.651 m) 187 lb (84.8 kg) LMP SpO2 BMI OB Status Smoking Status 02/04/2017 (Exact Date) 99% 31.12 kg/m2 Pregnant Never Smoker Vitals History BMI and BSA Data Body Mass Index Body Surface Area  
 31.12 kg/m 2 1.97 m 2 Preferred Pharmacy Pharmacy Name Phone [facility] [address] [phone] Your Updated Medication List  
  
   
This list is accurate as of: 4/28/17  5:05 PM.  Always use your most recent med list.  
  
  
  
  
 * insulin detemir 100 unit/mL (3 mL) Inpn Commonly known as:  LEVEMIR FLEXTOUCH  
sample * insulin detemir 100 unit/mL (3 mL) Inpn Commonly known as:  LEVEMIR FLEXTOUCH  
50 units at night  
  
 insulin lispro 100 unit/mL kwikpen Commonly known as:  HUMALOG  
8 units before bfast  6 units with lunch and dinner plus sliding scale to max of 30 units a day Insulin Needles (Disposable) 32 gauge x 5/32\" Ndle Commonly known as:  Manjula Pen Needle To use 4 times a day  
  
 metFORMIN  mg tablet Commonly known as:  GLUCOPHAGE XR Take 2 Tabs by mouth daily (with dinner). stop glyburide- metformin combo pill  
  
 methyldopa 250 mg tablet Commonly known as:  ALDOMET Take 1 Tab by mouth two (2) times a day. ONETOUCH ULTRA TEST strip Generic drug:  glucose blood VI test strips To check 4-6 times a day  
  
 prenatal multivit-ca-min-fe-fa Tab Take  by mouth. * Notice: This list has 2 medication(s) that are the same as other medications prescribed for you. Read the directions carefully, and ask your doctor or other care provider to review them with you. We Performed the Following AMB POC HEMOGLOBIN A1C [20629 CPT(R)] Follow-up Instructions Return in about 6 weeks (around 6/9/2017). To-Do List   
 06/01/2017 Lab:  HEMOGLOBIN A1C WITH EAG   
  
 06/01/2017 Lab:  TSH 3RD GENERATION Patient Instructions Stay  on metformin er 500 mg 2 pills at dinner Check blood sugars immediately before each meal and at bedtime One hour post meal  
 
 
 Levemir  insulin  56  units  at bed time ( over 95 mg fastings , increase levemir to 62 units ) Stop Toujeo Humalog  insulin 8   units before breakfast, 8 units before lunch and 8 units before dinner. ( you might need second shot of levemir in AM ) Also, add additional Humalog  as follows with meals  If blood sugars are: 
 
 
150-200 mg 2 units 201-250 mg 4 units 251-300 mg 6 units 301-350 mg 8 units 351-400 mg 10 units 401-450 mg 12 units 451-500 mg 14 units Less than 70 mg NO INSULIN 
 
 -------------------------------------------------------------------------------------------------------------------------------------------------------------------------- Do not skip meals Do not eat in between meals Reduce carbs- pasta, rice, potatoes, bread Do not drink juices or sodas Donot eat peanut butter Do not eat sugar free cookies and cakes Do not eat peaches, grapes, watermelon, oranges, pineapples,  And raisins Introducing [facility]! Rodolfo Bell introduces [employer] patient portal. Now you can access parts of your medical record, email your doctor's office, and request medication refills online. 1. In your internet browser, go to [email or web address] 2. Click on the First Time User? Click Here link in the Sign In box. You will see the New Member Sign Up page. 3. Enter your [employer] Access Code exactly as it appears below. You will not need to use this code after youve completed the sign-up process. If you do not sign up before the expiration date, you must request a new code. · [employer] Access Code: [email or web address] Expires: 6/20/2017  4:16 PM 
 
4. Enter the last four digits of your Social Security Number (xxxx) and Date of Birth (mm/dd/yyyy) as indicated and click Submit. You will be taken to the next sign-up page. 5. Create a [employer] ID. This will be your [employer] login ID and cannot be changed, so think of one that is secure and easy to remember. 6. Create a [employer] password. You can change your password at any time. 7. Enter your Password Reset Question and Answer. This can be used at a later time if you forget your password. 8. Enter your e-mail address. You will receive e-mail notification when new information is available in [address] 9. Click Sign Up. You can now view and download portions of your medical record. 10. Click the Download Summary menu link to download a portable copy of your medical information. If you have questions, please visit the Frequently Asked Questions section of the MyChart website. Remember, [facility] is NOT to be used for urgent needs. For medical emergencies, dial 911. Now available from your iPhone and Android! Please provide this summary of care documentation to your next provider. Your primary care clinician is listed as Alex Valles. If you have any questions after today's visit, please call [phone].

## 2017-06-02 LAB
EST. AVERAGE GLUCOSE BLD GHB EST-MCNC: 140 MG/DL
HBA1C MFR BLD: 6.5 % (ref 4.8–5.6)
TSH SERPL DL<=0.005 MIU/L-ACNC: 0.64 UIU/ML (ref 0.45–4.5)

## 2017-06-09 ENCOUNTER — OFFICE VISIT (OUTPATIENT)
Dept: ENDOCRINOLOGY | Age: 32
End: 2017-06-09

## 2017-06-09 VITALS
BODY MASS INDEX: 31.49 KG/M2 | TEMPERATURE: 98.1 F | HEART RATE: 96 BPM | SYSTOLIC BLOOD PRESSURE: 123 MMHG | RESPIRATION RATE: 20 BRPM | WEIGHT: 189 LBS | DIASTOLIC BLOOD PRESSURE: 64 MMHG | OXYGEN SATURATION: 100 % | HEIGHT: 65 IN

## 2017-06-09 DIAGNOSIS — O24.414 INSULIN CONTROLLED GESTATIONAL DIABETES MELLITUS (GDM) IN SECOND TRIMESTER: Primary | ICD-10-CM

## 2017-06-09 DIAGNOSIS — I10 ESSENTIAL HYPERTENSION: ICD-10-CM

## 2017-06-09 RX ORDER — ASPIRIN 81 MG/1
TABLET ORAL DAILY
COMMUNITY
End: 2019-07-11 | Stop reason: ALTCHOICE

## 2017-06-09 RX ORDER — INSULIN LISPRO 100 [IU]/ML
INJECTION, SOLUTION INTRAVENOUS; SUBCUTANEOUS
Qty: 15 ML | Refills: 6 | Status: SHIPPED | OUTPATIENT
Start: 2017-06-09 | End: 2017-10-12 | Stop reason: SDUPTHER

## 2017-06-09 NOTE — PROGRESS NOTES
HISTORY OF PRESENT ILLNESS  Jase Martinez is a 32 y.o. female. HPI  Patient is here for f/u after last visit for GDM,  From 2017   She is 18 weeks pregnant     Gained 2 lbs   She has done better in taking insulins   She noticed   Higher post meal sugars , but she is afraid to check     She has my phone number , but did nto call me   She has better sugars pre meal           Old history :    She  is referred by Dr. Ismael Eugene    H/o DM 2  For 12 years   Usually controlled well until she got pregnant  She says       pcp ( Dr. Crystal Ramachandran ) stopped  Medications - janumet xr she said after pregnancy was found out   Since, pt has noted severely high sugars and is very concerned       [place] one    Para 0   0 and she is through 6 weeks of second gestation. LMP : 2017   ELISEO : 2017     Current monitoring regimen: home blood tests - 3 times daily  Home blood sugar records: trend: fluctuating a lot  Any episodes of hypoglycemia? no      Current diabetic medications include got onto toujeo and stopped janmet xr recently . Eye exam current (within one year): yes  Weight trend: fluctuating a bit  Prior visit with dietician: no  Current diet: \"unhealthy\" diet in general  Current exercise: no regular exercise      Mother is diabetic, on insulin , type not sure         Past Medical History:   Diagnosis Date    Gestational diabetes        Social History     Social History    Marital status: SINGLE     Spouse name: N/A    Number of children: N/A    Years of education: N/A     Occupational History    Not on file. Social History Main Topics    Smoking status: Never Smoker    Smokeless tobacco: Not on file    Alcohol use No    Drug use: No    Sexual activity: Not on file     Other Topics Concern    Not on file     Social History Narrative       No family history on file. Review of Systems   Constitutional: Negative. HENT: Negative. Eyes: Negative for pain and redness. Respiratory: Negative. Cardiovascular: Negative for chest pain, palpitations and leg swelling. Gastrointestinal: Negative. Negative for constipation. Genitourinary: Negative. Musculoskeletal: Negative for myalgias. Skin: Negative. Neurological: Negative. Endo/Heme/Allergies: Negative. Psychiatric/Behavioral: Negative for depression and memory loss. The patient does not have insomnia. Physical Exam   Constitutional: She is oriented to person, place, and time. She appears well-developed and well-nourished. HENT:   Head: Normocephalic. Eyes: Conjunctivae and EOM are normal. Pupils are equal, round, and reactive to light. Neck: Normal range of motion. Neck supple. No JVD present. No tracheal deviation present. No thyromegaly present. Cardiovascular: Normal rate, regular rhythm and normal heart sounds. No murmur heard. Pulmonary/Chest: Breath sounds normal.   Abdominal: Soft. Bowel sounds are normal. gestational uterus present   Musculoskeletal: Normal range of motion. Lymphadenopathy:     She has no cervical adenopathy. Neurological: She is alert and oriented to person, place, and time. She has normal reflexes. Skin: Skin is warm. Psychiatric: She has a normal mood and affect.        Lab Results  Component Value Date/Time   Hemoglobin A1c 6.5 06/01/2017 03:04 PM      Lab Results  Component Value Date/Time   TSH 0.637 06/01/2017 03:04 PM          ASSESSMENT and PLAN    Type 2 diabetes complicating pregnancy : a1c is   6.5 %         From      June 2017      Compared to    6.4 %      From     April 28 2017 by finger stick    Compared  to  7.9 %   From finger stick on march 8 2017   Gestational diabetes ,  on insulin   She needs to  Do more post meal checks  The few she got done are high, indicating the need for rising the humalog     On basal bolus regimen  on metformin  mg 2 pills at dinner   Reviewed the log, fasting sugars are ranging from 90 to 100, mostly towards 90 mg   She is told to start checking post meal   More   She understood the rationale for good control and with insulin    She definitely has done well with this regimen  b-fast , lunch and dinner   :   45 gm of carb  Discussed patho-physiology of DM  during pregnancy     She is educated about the importance of glycemic control for healthy baby and safe delivery. She is told to check blood sugars before meals and one hour after meals and at bed time on some days (2 hours post dinner). She is educated about the targets being different during pregnancy   Fasting below 90 mg and one hour post prandial being 130- 140 mg       She is told to drop off her log/meter for review.   She will call office if blood sugars are staying about the target range for 2 consecutive days   She has my cell number     She is educated about possibility of worsening of retinopathy with aggressive glycemic control.       > 50 % of time is spent on counseling

## 2017-06-09 NOTE — PATIENT INSTRUCTIONS
Stay  on metformin er 500 mg 2 pills at dinner         Check blood sugars immediately before each meal and at bedtime  One hour post meal        Levemir  insulin  56  units  at bed time   Stop Toujeo    Increase  Humalog  insulin  9   units before breakfast, 9 units before lunch and 10 units before dinner.        Also, add additional Humalog  as follows with meals  If blood sugars are:      150-200 mg 2 units    201-250 mg 4 units    251-300 mg 6 units    301-350 mg 8 units    351-400 mg 10 units    401-450 mg 12 units    451-500 mg 14 units     Less than 70 mg NO INSULIN    --------------------------------------------------------------------------------------------------------------------------------------------------------------------------    Do not skip meals  Do not eat in between meals    Reduce carbs- pasta, rice, potatoes, bread   Do not drink juices or sodas  Donot eat peanut butter     Do not eat sugar free cookies and cakes   Do not eat peaches, grapes, watermelon, oranges, pineapples,  And raisins

## 2017-06-09 NOTE — MR AVS SNAPSHOT
Visit Information Date & Time Provider Department Dept. Phone Encounter #  
 6/9/2017  3:30 PM James Reynoso MD [facility] [phone] [id number] Follow-up Instructions Return in about 6 weeks (around 7/21/2017). Upcoming Health Maintenance Date Due DTaP/Tdap/Td series (1 - Tdap) 7/24/2006 PAP AKA CERVICAL CYTOLOGY 7/24/2006 INFLUENZA AGE 9 TO ADULT 8/1/2017 Allergies as of 6/9/2017  Review Complete On: 6/9/2017 By: James Reynoso MD  
  
 Severity Noted Reaction Type Reactions Lisinopril  03/22/2017    Swelling Current Immunizations  Never Reviewed No immunizations on file. Not reviewed this visit You Were Diagnosed With   
  
 Codes Comments Insulin controlled gestational diabetes mellitus (GDM) in second trimester    -  Primary ICD-10-CM: O24.414 ICD-9-CM: 859.59 Essential hypertension     ICD-10-CM: I10 
ICD-9-CM: 401.9 Vitals BP Pulse Temp Resp Height(growth percentile) Weight(growth percentile) 123/64 96 98.1 °F (36.7 °C) (Oral) 20 5' 5\" (1.651 m) 189 lb (85.7 kg) LMP SpO2 BMI OB Status Smoking Status 02/04/2017 (Exact Date) 100% 31.45 kg/m2 Pregnant Never Smoker BMI and BSA Data Body Mass Index Body Surface Area  
 31.45 kg/m 2 1.98 m 2 Preferred Pharmacy Pharmacy Name Phone [facility] [address] [facility] [phone] Your Updated Medication List  
  
   
This list is accurate as of: 6/9/17  4:24 PM.  Always use your most recent med list.  
  
  
  
  
 aspirin delayed-release 81 mg tablet Take  by mouth daily. * insulin detemir 100 unit/mL (3 mL) Inpn Commonly known as:  LEVEMIR FLEXTOUCH  
sample * insulin detemir 100 unit/mL (3 mL) Inpn Commonly known as:  LEVEMIR FLEXTOUCH  
50 units at night  
  
 insulin lispro 100 unit/mL kwikpen Commonly known as:  HUMALOG 8 units before bfast  6 units with lunch and dinner plus sliding scale to max of 30 units a day Insulin Needles (Disposable) 32 gauge x 5/32\" Ndle Commonly known as:  Manjula Pen Needle To use 4 times a day  
  
 metFORMIN  mg tablet Commonly known as:  GLUCOPHAGE XR Take 2 Tabs by mouth daily (with dinner). stop glyburide- metformin combo pill  
  
 methyldopa 250 mg tablet Commonly known as:  ALDOMET Take 1 Tab by mouth two (2) times a day. ONETOUCH ULTRA TEST strip Generic drug:  glucose blood VI test strips To check 4-6 times a day  
  
 prenatal multivit-ca-min-fe-fa Tab Take  by mouth. * Notice: This list has 2 medication(s) that are the same as other medications prescribed for you. Read the directions carefully, and ask your doctor or other care provider to review them with you. Follow-up Instructions Return in about 6 weeks (around 7/21/2017). Patient Instructions Stay  on metformin er 500 mg 2 pills at dinner Check blood sugars immediately before each meal and at bedtime One hour post meal  
 
 
 Levemir  insulin  56  units  at bed time Stop Toujeo Increase  Humalog  insulin  9   units before breakfast, 9 units before lunch and 10 units before dinner. Also, add additional Humalog  as follows with meals  If blood sugars are: 
 
 
150-200 mg 2 units 201-250 mg 4 units 251-300 mg 6 units 301-350 mg 8 units 351-400 mg 10 units 401-450 mg 12 units 451-500 mg 14 units Less than 70 mg NO INSULIN 
 
-------------------------------------------------------------------------------------------------------------------------------------------------------------------------- Do not skip meals Do not eat in between meals Reduce carbs- pasta, rice, potatoes, bread Do not drink juices or sodas Donot eat peanut butter Do not eat sugar free cookies and cakes Do not eat peaches, grapes, watermelon, oranges, pineapples,  And raisins Introducing [facility]! Rodolfo Ray introduces [employer] patient portal. Now you can access parts of your medical record, email your doctor's office, and request medication refills online. 1. In your internet browser, go to [email or web address] 2. Click on the First Time User? Click Here link in the Sign In box. You will see the New Member Sign Up page. 3. Enter your [employer] Access Code exactly as it appears below. You will not need to use this code after youve completed the sign-up process. If you do not sign up before the expiration date, you must request a new code. · [employer] Access Code: [email or web address] Expires: 6/20/2017  4:16 PM 
 
4. Enter the last four digits of your Social Security Number (xxxx) and Date of Birth (mm/dd/yyyy) as indicated and click Submit. You will be taken to the next sign-up page. 5. Create a [employer] ID. This will be your [employer] login ID and cannot be changed, so think of one that is secure and easy to remember. 6. Create a [employer] password. You can change your password at any time. 7. Enter your Password Reset Question and Answer. This can be used at a later time if you forget your password. 8. Enter your e-mail address. You will receive e-mail notification when new information is available in [address] 9. Click Sign Up. You can now view and download portions of your medical record. 10. Click the Download Summary menu link to download a portable copy of your medical information. If you have questions, please visit the Frequently Asked Questions section of the [employer] website. Remember, [employer] is NOT to be used for urgent needs. For medical emergencies, dial 911. Now available from your iPhone and Android! Please provide this summary of care documentation to your next provider. Your primary care clinician is listed as Ej Shea. If you have any questions after today's visit, please call [phone].

## 2017-06-09 NOTE — PROGRESS NOTES
Wt Readings from Last 3 Encounters:   06/09/17 189 lb (85.7 kg)   04/28/17 187 lb (84.8 kg)   03/28/17 187 lb 14.4 oz (85.2 kg)     Temp Readings from Last 3 Encounters:   06/09/17 98.1 °F (36.7 °C) (Oral)   04/28/17 98.1 °F (36.7 °C) (Oral)   03/28/17 97.5 °F (36.4 °C) (Oral)     BP Readings from Last 3 Encounters:   06/09/17 123/64   04/28/17 120/74   03/28/17 118/66     Pulse Readings from Last 3 Encounters:   06/09/17 96   04/28/17 87   03/28/17 75     Lab Results   Component Value Date/Time    Hemoglobin A1c 6.5 06/01/2017 03:04 PM    Hemoglobin A1c (POC) 6.4 04/28/2017 04:51 PM

## 2017-07-03 RX ORDER — LANCETS 33 GAUGE
EACH MISCELLANEOUS
Qty: 200 LANCET | Refills: 6 | Status: SHIPPED | OUTPATIENT
Start: 2017-07-03 | End: 2017-11-07

## 2017-07-26 ENCOUNTER — OFFICE VISIT (OUTPATIENT)
Dept: ENDOCRINOLOGY | Age: 32
End: 2017-07-26

## 2017-07-26 VITALS
HEIGHT: 65 IN | BODY MASS INDEX: 32.32 KG/M2 | RESPIRATION RATE: 20 BRPM | SYSTOLIC BLOOD PRESSURE: 124 MMHG | TEMPERATURE: 98.3 F | OXYGEN SATURATION: 100 % | HEART RATE: 88 BPM | WEIGHT: 194 LBS | DIASTOLIC BLOOD PRESSURE: 66 MMHG

## 2017-07-26 DIAGNOSIS — Z79.4 UNCONTROLLED TYPE 2 DIABETES MELLITUS WITH HYPERGLYCEMIA, WITH LONG-TERM CURRENT USE OF INSULIN (HCC): ICD-10-CM

## 2017-07-26 DIAGNOSIS — O24.414 INSULIN CONTROLLED GESTATIONAL DIABETES MELLITUS (GDM) IN SECOND TRIMESTER: Primary | ICD-10-CM

## 2017-07-26 DIAGNOSIS — E11.65 UNCONTROLLED TYPE 2 DIABETES MELLITUS WITH HYPERGLYCEMIA, WITH LONG-TERM CURRENT USE OF INSULIN (HCC): ICD-10-CM

## 2017-07-26 LAB — HBA1C MFR BLD HPLC: 5.7 %

## 2017-07-26 NOTE — MR AVS SNAPSHOT
Visit Information Date & Time Provider Department Dept. Phone Encounter #  
 7/26/2017  3:30 PM Gauri Richardson MD [facility] [phone] [id number] Follow-up Instructions Return in about 6 weeks (around 9/6/2017). Upcoming Health Maintenance Date Due DTaP/Tdap/Td series (1 - Tdap) 7/24/2006 PAP AKA CERVICAL CYTOLOGY 7/24/2006 INFLUENZA AGE 9 TO ADULT 8/1/2017 Allergies as of 7/26/2017  Review Complete On: 7/26/2017 By: Gauri Richardson MD  
  
 Severity Noted Reaction Type Reactions Lisinopril  03/22/2017    Swelling Current Immunizations  Never Reviewed No immunizations on file. Not reviewed this visit You Were Diagnosed With   
  
 Codes Comments Insulin controlled gestational diabetes mellitus (GDM) in second trimester    -  Primary ICD-10-CM: O24.414 ICD-9-CM: 004.53 Uncontrolled type 2 diabetes mellitus with hyperglycemia, with long-term current use of insulin (HCC)     ICD-10-CM: E11.65, Z79.4 ICD-9-CM: 250.02, V58.67 Vitals BP Pulse Temp Resp Height(growth percentile) Weight(growth percentile) 124/66 88 98.3 °F (36.8 °C) (Oral) 20 5' 5\" (1.651 m) 194 lb (88 kg) LMP SpO2 BMI OB Status Smoking Status 02/04/2017 (Exact Date) 100% 32.28 kg/m2 Pregnant Never Smoker BMI and BSA Data Body Mass Index Body Surface Area  
 32.28 kg/m 2 2.01 m 2 Preferred Pharmacy Pharmacy Name Phone [facility] [phone] Your Updated Medication List  
  
   
This list is accurate as of: 7/26/17  4:26 PM.  Always use your most recent med list.  
  
  
  
  
 aspirin delayed-release 81 mg tablet Take  by mouth daily. * insulin detemir 100 unit/mL (3 mL) Inpn Commonly known as:  LEVEMIR FLEXTOUCH  
sample * insulin detemir 100 unit/mL (3 mL) Inpn Commonly known as:  Ashely Sand  
 Increase to 56 units at night  
  
 insulin lispro 100 unit/mL kwikpen Commonly known as:  HUMALOG Increase to 9 units before bfast  9 units before lunch and 10 units before dinner plus sliding scale. Insulin Needles (Disposable) 32 gauge x 5/32\" Ndle Commonly known as:  Manjula Pen Needle To use 4 times a day  
  
 lancets 33 gauge Misc Commonly known as: One Touch Henrine Held Use 6 times daily. Dx code O23.80  
  
 metFORMIN  mg tablet Commonly known as:  GLUCOPHAGE XR Take 2 Tabs by mouth daily (with dinner). stop glyburide- metformin combo pill  
  
 methyldopa 250 mg tablet Commonly known as:  ALDOMET Take 1 Tab by mouth two (2) times a day. ONETOUCH ULTRA TEST strip Generic drug:  glucose blood VI test strips To check 4-6 times a day  
  
 prenatal multivit-ca-min-fe-fa Tab Take  by mouth. * Notice: This list has 2 medication(s) that are the same as other medications prescribed for you. Read the directions carefully, and ask your doctor or other care provider to review them with you. We Performed the Following AMB POC HEMOGLOBIN A1C [46853 CPT(R)] Follow-up Instructions Return in about 6 weeks (around 9/6/2017). Patient Instructions Stay  on metformin er 500 mg 2 pills at dinner Check blood sugars immediately before each meal and at bedtime One hour post meal  
 
 
 Levemir  insulin  56  units  at bed time Stop Toujeo Humalog  insulin  9   units before breakfast, 9 units before lunch and 10 units before dinner. Also, add additional Humalog  as follows with meals  If blood sugars are: 
 
 
150-200 mg 2 units 201-250 mg 4 units 251-300 mg 6 units 301-350 mg 8 units 351-400 mg 10 units 401-450 mg 12 units 451-500 mg 14 units Less than 70 mg NO INSULIN 
 
--------------------------------------------------------------------------- ----------------------------------------------------------------------------------------------- Do not skip meals Do not eat in between meals Reduce carbs- pasta, rice, potatoes, bread Do not drink juices or sodas Donot eat peanut butter Do not eat sugar free cookies and cakes Do not eat peaches, grapes, watermelon, oranges, pineapples,  And raisins Introducing [facility]! Dasha Stubbs introduces [employer] patient portal. Now you can access parts of your medical record, email your doctor's office, and request medication refills online. 1. In your internet browser, go to [email or web address] [email or web address]. [email or web address] 2. Click on the First Time User? Click Here link in the Sign In box. You will see the New Member Sign Up page. 3. Enter your [employer] Access Code exactly as it appears below. You will not need to use this code after youve completed the sign-up process. If you do not sign up before the expiration date, you must request a new code. · [employer] Access Code: [email or web address] Expires: 10/24/2017  4:26 PM 
 
4. Enter the last four digits of your Social Security Number (xxxx) and Date of Birth (mm/dd/yyyy) as indicated and click Submit. You will be taken to the next sign-up page. 5. Create a Planet Paymentt ID. This will be your [employer] login ID and cannot be changed, so think of one that is secure and easy to remember. 6. Create a [employer] password. You can change your password at any time. 7. Enter your Password Reset Question and Answer. This can be used at a later time if you forget your password. 8. Enter your e-mail address. You will receive e-mail notification when new information is available in [address] 9. Click Sign Up. You can now view and download portions of your medical record. 10. Click the Download Summary menu link to download a portable copy of your medical information.  
 
If you have questions, please visit the Frequently Asked Questions section of the [employer]. Remember, [employer] is NOT to be used for urgent needs. For medical emergencies, dial 911. Now available from your iPhone and Android! Please provide this summary of care documentation to your next provider. Your primary care clinician is listed as Willy Grant. If you have any questions after today's visit, please call [phone].

## 2017-07-26 NOTE — PROGRESS NOTES
HISTORY OF PRESENT ILLNESS  Tristan Kilgore is a 28 y.o. female. HPI  Patient is here for f/u after last visit for GDM,  From 2017   She is  24- 25   weeks pregnant     Gained 5 lbs   She has done better in taking insulins   She noticed   Higher post meal sugars , but she is afraid to check     She has better sugars pre meal           Old history :    She  is referred by Dr. Breanna Hicks    H/o DM 2  For 12 years   Usually controlled well until she got pregnant  She says       pcp ( Dr. Joselin Field ) stopped  Medications - janumet xr she said after pregnancy was found out   Since, pt has noted severely high sugars and is very concerned       [place] one    Para 0   0 and she is through 6 weeks of second gestation. LMP : 2017   ELISEO : 2017     Current monitoring regimen: home blood tests - 3 times daily  Home blood sugar records: trend: fluctuating a lot  Any episodes of hypoglycemia? no      Current diabetic medications include got onto toujeo and stopped janmet xr recently . Eye exam current (within one year): yes  Weight trend: fluctuating a bit  Prior visit with dietician: no  Current diet: \"unhealthy\" diet in general  Current exercise: no regular exercise      Mother is diabetic, on insulin , type not sure         Past Medical History:   Diagnosis Date    Gestational diabetes        Social History     Social History    Marital status: SINGLE     Spouse name: N/A    Number of children: N/A    Years of education: N/A     Occupational History    Not on file. Social History Main Topics    Smoking status: Never Smoker    Smokeless tobacco: Not on file    Alcohol use No    Drug use: No    Sexual activity: Not on file     Other Topics Concern    Not on file     Social History Narrative       No family history on file. Review of Systems   Constitutional: Negative. HENT: Negative. Eyes: Negative for pain and redness. Respiratory: Negative.     Cardiovascular: Negative for chest pain, palpitations and leg swelling. Gastrointestinal: Negative. Negative for constipation. Genitourinary: Negative. Musculoskeletal: Negative for myalgias. Skin: Negative. Neurological: Negative. Endo/Heme/Allergies: Negative. Psychiatric/Behavioral: Negative for depression and memory loss. The patient does not have insomnia. Physical Exam   Constitutional: She is oriented to person, place, and time. She appears well-developed and well-nourished. HENT:   Head: Normocephalic. Eyes: Conjunctivae and EOM are normal. Pupils are equal, round, and reactive to light. Neck: Normal range of motion. Neck supple. No JVD present. No tracheal deviation present. No thyromegaly present. Cardiovascular: Normal rate, regular rhythm and normal heart sounds. No murmur heard. Pulmonary/Chest: Breath sounds normal.   Abdominal: Soft. Bowel sounds are normal. gestational uterus present   Musculoskeletal: Normal range of motion. Lymphadenopathy:     She has no cervical adenopathy. Neurological: She is alert and oriented to person, place, and time. She has normal reflexes. Skin: Skin is warm. Psychiatric: She has a normal mood and affect.        Lab Results   Component Value Date/Time    Hemoglobin A1c 6.5 06/01/2017 03:04 PM      Lab Results   Component Value Date/Time    TSH 0.637 06/01/2017 03:04 PM          ASSESSMENT and PLAN    Type 2 diabetes complicating pregnancy : a1c is   5.7 %     From     July 2017    Compared to    6.5 %         From      June 2017      Compared to    6.4 %      From     April 28 2017 by finger stick    Compared  to  7.9 %   From finger stick on march 8 2017     Well controlled diabetes  Gestational diabetes ,  on insulin   She needs to  Do more post meal checks    On basal bolus regimen  on metformin  mg 2 pills at dinner   Reviewed the log, fasting sugars are ranging from 90 to 100, mostly towards 90 mg   She is told to start checking post meal   More   She understood the rationale for good control and with insulin    She definitely has done well with this regimen  b-fast , lunch and dinner   :   45 gm of carb  Discussed patho-physiology of DM  during pregnancy     She is educated about the importance of glycemic control for healthy baby and safe delivery. She is told to check blood sugars before meals and one hour after meals and at bed time on some days (2 hours post dinner). She is educated about the targets being different during pregnancy   Fasting below 90 mg and one hour post prandial being 130- 140 mg       She is told to drop off her log/meter for review.   She will call office if blood sugars are staying about the target range for 2 consecutive days   She has my cell number     She is educated about possibility of worsening of retinopathy with aggressive glycemic control.       > 50 % of time is spent on counseling

## 2017-07-26 NOTE — PROGRESS NOTES
Wt Readings from Last 3 Encounters:   07/26/17 194 lb (88 kg)   06/09/17 189 lb (85.7 kg)   04/28/17 187 lb (84.8 kg)     Temp Readings from Last 3 Encounters:   07/26/17 98.3 °F (36.8 °C) (Oral)   06/09/17 98.1 °F (36.7 °C) (Oral)   04/28/17 98.1 °F (36.7 °C) (Oral)     BP Readings from Last 3 Encounters:   07/26/17 124/66   06/09/17 123/64   04/28/17 120/74     Pulse Readings from Last 3 Encounters:   07/26/17 88   06/09/17 96   04/28/17 87     Lab Results   Component Value Date/Time    Hemoglobin A1c 6.5 06/01/2017 03:04 PM    Hemoglobin A1c (POC) 6.4 04/28/2017 04:51 PM

## 2017-07-26 NOTE — PATIENT INSTRUCTIONS
Stay  on metformin er 500 mg 2 pills at dinner         Check blood sugars immediately before each meal and at bedtime  One hour post meal        Levemir  insulin  56  units  at bed time   Stop Toujeo     Humalog  insulin  9   units before breakfast, 9 units before lunch and 10 units before dinner.        Also, add additional Humalog  as follows with meals  If blood sugars are:      150-200 mg 2 units    201-250 mg 4 units    251-300 mg 6 units    301-350 mg 8 units    351-400 mg 10 units    401-450 mg 12 units    451-500 mg 14 units     Less than 70 mg NO INSULIN    --------------------------------------------------------------------------------------------------------------------------------------------------------------------------    Do not skip meals  Do not eat in between meals    Reduce carbs- pasta, rice, potatoes, bread   Do not drink juices or sodas  Donot eat peanut butter     Do not eat sugar free cookies and cakes   Do not eat peaches, grapes, watermelon, oranges, pineapples,  And raisins

## 2017-09-06 ENCOUNTER — OFFICE VISIT (OUTPATIENT)
Dept: ENDOCRINOLOGY | Age: 32
End: 2017-09-06

## 2017-09-06 VITALS
DIASTOLIC BLOOD PRESSURE: 59 MMHG | HEIGHT: 65 IN | SYSTOLIC BLOOD PRESSURE: 127 MMHG | WEIGHT: 199 LBS | BODY MASS INDEX: 33.15 KG/M2 | RESPIRATION RATE: 16 BRPM | TEMPERATURE: 97.8 F | HEART RATE: 81 BPM

## 2017-09-06 DIAGNOSIS — Z79.4 UNCONTROLLED TYPE 2 DIABETES MELLITUS WITH HYPERGLYCEMIA, WITH LONG-TERM CURRENT USE OF INSULIN (HCC): Primary | ICD-10-CM

## 2017-09-06 DIAGNOSIS — E11.65 UNCONTROLLED TYPE 2 DIABETES MELLITUS WITH HYPERGLYCEMIA, WITH LONG-TERM CURRENT USE OF INSULIN (HCC): Primary | ICD-10-CM

## 2017-09-06 LAB
GLUCOSE POC: 166 MG/DL
HBA1C MFR BLD HPLC: 5.8 %

## 2017-09-06 NOTE — PROGRESS NOTES
HISTORY OF PRESENT ILLNESS  Goldy Heath is a 28 y.o. female. HPI  Patient is here for f/u after last visit for GDM,  From 2017   She is  31    weeks pregnant     Gained 5 lbs   She has not done a good job  in taking insulins   She has not been diet compliant     She has high sugars on log        Old history :    She  is referred by Dr. Lima Irene    H/o DM 2  For 12 years   Usually controlled well until she got pregnant  She says       pcp ( Dr. Milo Thomason ) stopped  Medications - janumet xr she said after pregnancy was found out   Since, pt has noted severely high sugars and is very concerned       [place] one    Para 0   0 and she is through 6 weeks of second gestation. LMP : 2017   ELISEO : 2017     Current monitoring regimen: home blood tests - 3 times daily  Home blood sugar records: trend: fluctuating a lot  Any episodes of hypoglycemia? no      Current diabetic medications include got onto toujeo and stopped janmet xr recently . Eye exam current (within one year): yes  Weight trend: fluctuating a bit  Prior visit with dietician: no  Current diet: \"unhealthy\" diet in general  Current exercise: no regular exercise      Mother is diabetic, on insulin , type not sure         Past Medical History:   Diagnosis Date    Gestational diabetes        Social History     Social History    Marital status: SINGLE     Spouse name: N/A    Number of children: N/A    Years of education: N/A     Occupational History    Not on file. Social History Main Topics    Smoking status: Never Smoker    Smokeless tobacco: Never Used    Alcohol use No    Drug use: No    Sexual activity: Not on file     Other Topics Concern    Not on file     Social History Narrative       History reviewed. No pertinent family history. Review of Systems   Constitutional: Negative. HENT: Negative. Eyes: Negative for pain and redness. Respiratory: Negative.     Cardiovascular: Negative for chest pain, palpitations and leg swelling. Gastrointestinal: Negative. Negative for constipation. Genitourinary: Negative. Musculoskeletal: Negative for myalgias. Skin: Negative. Neurological: Negative. Endo/Heme/Allergies: Negative. Psychiatric/Behavioral: Negative for depression and memory loss. The patient does not have insomnia. Physical Exam   Constitutional: She is oriented to person, place, and time. She appears well-developed and well-nourished. HENT:   Head: Normocephalic. Eyes: Conjunctivae and EOM are normal. Pupils are equal, round, and reactive to light. Neck: Normal range of motion. Neck supple. No JVD present. No tracheal deviation present. No thyromegaly present. Cardiovascular: Normal rate, regular rhythm and normal heart sounds. No murmur heard. Pulmonary/Chest: Breath sounds normal.   Abdominal: Soft. Bowel sounds are normal. gestational uterus present   Musculoskeletal: Normal range of motion. Lymphadenopathy:     She has no cervical adenopathy. Neurological: She is alert and oriented to person, place, and time. She has normal reflexes. Skin: Skin is warm. Psychiatric: She has a normal mood and affect.        Lab Results   Component Value Date/Time    Hemoglobin A1c 6.5 06/01/2017 03:04 PM    Glucose  09/06/2017 03:52 PM      Lab Results   Component Value Date/Time    TSH 0.637 06/01/2017 03:04 PM          ASSESSMENT and PLAN    Type 2 diabetes complicating pregnancy : a1c is   5.8 %      From     Sept 2017     comapred to   5.7 %     From     July 2017    Compared to    6.5 %         From      June 2017      Compared to    6.4 %      From     April 28 2017 by finger stick    Compared  to  7.9 %   From finger stick on march 8 2017     She has lost control   Gestational diabetes ,  on insulin   She needs to  Do more post meal checks  On basal bolus regimen  on metformin  mg 2 pills at dinner   Reviewed the log, fasting sugars are ranging from 100, and several other numbers are over 200 mg   She is told to start checking post meal   More oftern and to get more compliant with diet   She is asked to start on levemir in AM  She understood the rationale for good control and with insulin    She definitely has done well with this regimen  b-fast , lunch and dinner   :   45 gm of carb  Discussed patho-physiology of DM  during pregnancy     She is educated about the importance of glycemic control for healthy baby and safe delivery. She is told to check blood sugars before meals and one hour after meals and at bed time on some days (2 hours post dinner). She is educated about the targets being different during pregnancy   Fasting below 90 mg and one hour post prandial being 130- 140 mg       She is told to drop off her log/meter for review.   She will call office if blood sugars are staying about the target range for 2 consecutive days   She has my cell number     She is educated about possibility of worsening of retinopathy with aggressive glycemic control.       > 50 % of time is spent on counseling

## 2017-09-06 NOTE — PROGRESS NOTES
Wt Readings from Last 3 Encounters:   07/26/17 194 lb (88 kg)   06/09/17 189 lb (85.7 kg)   04/28/17 187 lb (84.8 kg)     Temp Readings from Last 3 Encounters:   07/26/17 98.3 °F (36.8 °C) (Oral)   06/09/17 98.1 °F (36.7 °C) (Oral)   04/28/17 98.1 °F (36.7 °C) (Oral)     BP Readings from Last 3 Encounters:   07/26/17 124/66   06/09/17 123/64   04/28/17 120/74     Pulse Readings from Last 3 Encounters:   07/26/17 88   06/09/17 96   04/28/17 87     Lab Results   Component Value Date/Time    Hemoglobin A1c 6.5 06/01/2017 03:04 PM    Hemoglobin A1c (POC) 5.7 07/26/2017 04:19 PM

## 2017-09-06 NOTE — MR AVS SNAPSHOT
Visit Information Date & Time Provider Department Dept. Phone Encounter #  
 9/6/2017  3:30 PM Yonny Carrera MD [facility] [phone] [id number] Follow-up Instructions Return in about 4 weeks (around 10/4/2017). Upcoming Health Maintenance Date Due  
 LIPID PANEL Q1 1985 FOOT EXAM Q1 7/24/1995 MICROALBUMIN Q1 7/24/1995 EYE EXAM RETINAL OR DILATED Q1 7/24/1995 Pneumococcal 19-64 Medium Risk (1 of 1 - PPSV23) 7/24/2004 DTaP/Tdap/Td series (1 - Tdap) 7/24/2006 PAP AKA CERVICAL CYTOLOGY 7/24/2006 INFLUENZA AGE 9 TO ADULT 8/1/2017 OB 3RD TRIMESTER TDAP 8/12/2017 HEMOGLOBIN A1C Q6M 1/26/2018 Allergies as of 9/6/2017  Review Complete On: 9/6/2017 By: Yonny Carrera MD  
  
 Severity Noted Reaction Type Reactions Lisinopril  03/22/2017    Swelling Current Immunizations  Never Reviewed No immunizations on file. Not reviewed this visit You Were Diagnosed With   
  
 Codes Comments Uncontrolled type 2 diabetes mellitus with hyperglycemia, with long-term current use of insulin (HCC)    -  Primary ICD-10-CM: E11.65, Z79.4 ICD-9-CM: 250.02, V58.67 Vitals BP Pulse Temp Resp Height(growth percentile) Weight(growth percentile) 127/59 (BP 1 Location: Left arm, BP Patient Position: Sitting) 81 97.8 °F (36.6 °C) (Oral) 16 5' 5\" (1.651 m) 199 lb (90.3 kg) LMP BMI OB Status Smoking Status 02/04/2017 (Exact Date) 33.12 kg/m2 Pregnant Never Smoker BMI and BSA Data Body Mass Index Body Surface Area  
 33.12 kg/m 2 2.04 m 2 Preferred Pharmacy Pharmacy Name Phone [facility] [address] [facility] [phone] Your Updated Medication List  
  
   
This list is accurate as of: 9/6/17  4:09 PM.  Always use your most recent med list.  
  
  
  
  
 aspirin delayed-release 81 mg tablet Take  by mouth daily. * insulin detemir 100 unit/mL (3 mL) Inpn Commonly known as:  LEVEMIR FLEXTOUCH  
sample * insulin detemir 100 unit/mL (3 mL) Inpn Commonly known as:  Tammy Job Increase to 56 units at night  
  
 insulin lispro 100 unit/mL kwikpen Commonly known as:  HUMALOG Increase to 9 units before bfast  9 units before lunch and 10 units before dinner plus sliding scale. Insulin Needles (Disposable) 32 gauge x 5/32\" Ndle Commonly known as:  Manjula Pen Needle To use 4 times a day  
  
 lancets 33 gauge Misc Commonly known as: One Touch Gwynneth Sidles Use 6 times daily. Dx code O23.80  
  
 metFORMIN  mg tablet Commonly known as:  GLUCOPHAGE XR Take 2 Tabs by mouth daily (with dinner). stop glyburide- metformin combo pill  
  
 methyldopa 250 mg tablet Commonly known as:  ALDOMET Take 1 Tab by mouth two (2) times a day. ONETOUCH ULTRA TEST strip Generic drug:  glucose blood VI test strips To check 4-6 times a day  
  
 prenatal multivit-ca-min-fe-fa Tab Take  by mouth. * Notice: This list has 2 medication(s) that are the same as other medications prescribed for you. Read the directions carefully, and ask your doctor or other care provider to review them with you. We Performed the Following AMB POC GLUCOSE, QUANTITATIVE, BLOOD [42674 CPT(R)] AMB POC HEMOGLOBIN A1C [28650 CPT(R)] Follow-up Instructions Return in about 4 weeks (around 10/4/2017). Patient Instructions Stay  on metformin er 500 mg 2 pills at dinner Check blood sugars immediately before each meal and at bedtime One hour post meal  
 
 
 Levemir  insulin  62   units  at bed time   And START   levemir in the morning at  16 units Stop Toujeo Humalog  insulin (  Decrease ) 8  units before breakfast, 9 units before lunch and 10 units before dinner. Also, add additional Humalog  as follows with meals  If blood sugars are: 150-200 mg 2 units 201-250 mg 4 units 251-300 mg 6 units 301-350 mg 8 units 351-400 mg 10 units 401-450 mg 12 units 451-500 mg 14 units Less than 70 mg NO INSULIN 
 
-------------------------------------------------------------------------------------------------------------------------------------------------------------------------- Do not skip meals Do not eat in between meals Reduce carbs- pasta, rice, potatoes, bread Do not drink juices or sodas Donot eat peanut butter Do not eat sugar free cookies and cakes Do not eat peaches, grapes, watermelon, oranges, pineapples,  And raisins Introducing [facility]! Gina Longoria introduces ApiFix patient portal. Now you can access parts of your medical record, email your doctor's office, and request medication refills online. 1. In your internet browser, go to [email or web address] 2. Click on the First Time User? Click Here link in the Sign In box. You will see the New Member Sign Up page. 3. Enter your ApiFix Access Code exactly as it appears below. You will not need to use this code after youve completed the sign-up process. If you do not sign up before the expiration date, you must request a new code. · ApiFix Access Code: [email or web address] Expires: 10/24/2017  4:26 PM 
 
4. Enter the last four digits of your Social Security Number (xxxx) and Date of Birth (mm/dd/yyyy) as indicated and click Submit. You will be taken to the next sign-up page. 5. Create a Zarangat ID. This will be your ApiFix login ID and cannot be changed, so think of one that is secure and easy to remember. 6. Create a ApiFix password. You can change your password at any time. 7. Enter your Password Reset Question and Answer. This can be used at a later time if you forget your password. 8. Enter your e-mail address. You will receive e-mail notification when new information is available in [address] 9. Click Sign Up. You can now view and download portions of your medical record. 10. Click the Download Summary menu link to download a portable copy of your medical information. If you have questions, please visit the Frequently Asked Questions section of the K[employer] website. Remember, Kranem is NOT to be used for urgent needs. For medical emergencies, dial 911. Now available from your iPhone and Android! Please provide this summary of care documentation to your next provider. Your primary care clinician is listed as Rosi Berry. If you have any questions after today's visit, please call [phone].

## 2017-09-06 NOTE — PATIENT INSTRUCTIONS
Stay  on metformin er 500 mg 2 pills at dinner         Check blood sugars immediately before each meal and at bedtime  One hour post meal        Levemir  insulin  62   units  at bed time   And START   levemir in the morning at  16 units   Stop Toujeo     Humalog  insulin (  Decrease ) 8  units before breakfast, 9 units before lunch and 10 units before dinner.        Also, add additional Humalog  as follows with meals  If blood sugars are:      150-200 mg 2 units    201-250 mg 4 units    251-300 mg 6 units    301-350 mg 8 units    351-400 mg 10 units    401-450 mg 12 units    451-500 mg 14 units     Less than 70 mg NO INSULIN    --------------------------------------------------------------------------------------------------------------------------------------------------------------------------    Do not skip meals  Do not eat in between meals    Reduce carbs- pasta, rice, potatoes, bread   Do not drink juices or sodas  Donot eat peanut butter     Do not eat sugar free cookies and cakes   Do not eat peaches, grapes, watermelon, oranges, pineapples,  And raisins

## 2017-09-11 RX ORDER — PEN NEEDLE, DIABETIC 32GX 5/32"
NEEDLE, DISPOSABLE MISCELLANEOUS
Qty: 100 PEN NEEDLE | Refills: 2 | Status: SHIPPED | OUTPATIENT
Start: 2017-09-11 | End: 2017-11-07

## 2017-10-12 ENCOUNTER — OFFICE VISIT (OUTPATIENT)
Dept: ENDOCRINOLOGY | Age: 32
End: 2017-10-12

## 2017-10-12 VITALS
WEIGHT: 204.5 LBS | HEIGHT: 65 IN | HEART RATE: 96 BPM | BODY MASS INDEX: 34.07 KG/M2 | TEMPERATURE: 98.3 F | RESPIRATION RATE: 18 BRPM | DIASTOLIC BLOOD PRESSURE: 69 MMHG | SYSTOLIC BLOOD PRESSURE: 113 MMHG | OXYGEN SATURATION: 97 %

## 2017-10-12 DIAGNOSIS — O24.414 INSULIN CONTROLLED GESTATIONAL DIABETES MELLITUS (GDM) IN THIRD TRIMESTER: Primary | ICD-10-CM

## 2017-10-12 RX ORDER — INSULIN LISPRO 100 [IU]/ML
INJECTION, SOLUTION INTRAVENOUS; SUBCUTANEOUS
Qty: 15 ML | Refills: 6 | Status: SHIPPED | OUTPATIENT
Start: 2017-10-12 | End: 2017-11-07

## 2017-10-12 NOTE — PATIENT INSTRUCTIONS
Stay  on metformin er 500 mg 2 pills at dinner         Check blood sugars immediately before each meal and at bedtime  One hour post meal        Levemir  insulin  62   units  at bed time   And  Increase  levemir in the morning  To  28  units   Stop Toujeo     Humalog  insulin  Decrease  7  units before breakfast, 9 units before lunch and increase 12 units before dinner.        Also, add additional Humalog  as follows with meals  If blood sugars are:      150-200 mg 2 units    201-250 mg 4 units    251-300 mg 6 units    301-350 mg 8 units    351-400 mg 10 units    401-450 mg 12 units    451-500 mg 14 units     Less than 70 mg NO INSULIN    --------------------------------------------------------------------------------------------------------------------------------------------------------------------------    Do not skip meals  Do not eat in between meals    Reduce carbs- pasta, rice, potatoes, bread   Do not drink juices or sodas  Donot eat peanut butter     Do not eat sugar free cookies and cakes   Do not eat peaches, grapes, watermelon, oranges, pineapples,  And raisins

## 2017-10-12 NOTE — PROGRESS NOTES
Chief Complaint   Patient presents with    Diabetes     1. Have you been to the ER, urgent care clinic since your last visit? Hospitalized since your last visit? No    2. Have you seen or consulted any other health care providers outside of the [address] [place] [address] since your last visit? Include any pap smears or colon screening.  No     Pt have meter    No foot or eye exam    Wt Readings from Last 3 Encounters:   10/12/17 204 lb 8 oz (92.8 kg)   09/06/17 199 lb (90.3 kg)   07/26/17 194 lb (88 kg)     Temp Readings from Last 3 Encounters:   10/12/17 98.3 °F (36.8 °C) (Oral)   09/06/17 97.8 °F (36.6 °C) (Oral)   07/26/17 98.3 °F (36.8 °C) (Oral)     BP Readings from Last 3 Encounters:   10/12/17 113/69   09/06/17 127/59   07/26/17 124/66     Pulse Readings from Last 3 Encounters:   10/12/17 96   09/06/17 81   07/26/17 88

## 2017-10-12 NOTE — PROGRESS NOTES
HISTORY OF PRESENT ILLNESS  Rayo Alvarado is a 28 y.o. female. HPI  Patient is here for f/u after last visit for GDM,  From 2017   She is  36    weeks pregnant     Gained 5 lbs   She has not done a good job  in taking insulins   She has not been diet compliant     She has high sugars on log  At dinners       Old history :    She  is referred by Dr. Miriam Ivy    H/o DM 2  For 12 years   Usually controlled well until she got pregnant  She says       pcp ( Dr. Nando Templeton ) stopped  Medications - janumet xr she said after pregnancy was found out   Since, pt has noted severely high sugars and is very concerned       [place] one    Para 0   0 and she is through 6 weeks of second gestation. LMP : 2017   ELISEO : 2017     Current monitoring regimen: home blood tests - 3 times daily  Home blood sugar records: trend: fluctuating a lot  Any episodes of hypoglycemia? no      Current diabetic medications include got onto toujeo and stopped janmet xr recently . Eye exam current (within one year): yes  Weight trend: fluctuating a bit  Prior visit with dietician: no  Current diet: \"unhealthy\" diet in general  Current exercise: no regular exercise      Mother is diabetic, on insulin , type not sure         Past Medical History:   Diagnosis Date    Gestational diabetes        Social History     Social History    Marital status: SINGLE     Spouse name: N/A    Number of children: N/A    Years of education: N/A     Occupational History    Not on file. Social History Main Topics    Smoking status: Never Smoker    Smokeless tobacco: Never Used    Alcohol use No    Drug use: No    Sexual activity: Not on file     Other Topics Concern    Not on file     Social History Narrative       History reviewed. No pertinent family history. Review of Systems   Constitutional: Negative. HENT: Negative. Eyes: Negative for pain and redness. Respiratory: Negative.     Cardiovascular: Negative for chest pain, palpitations and leg swelling. Gastrointestinal: Negative. Negative for constipation. Genitourinary: Negative. Musculoskeletal: Negative for myalgias. Skin: Negative. Neurological: Negative. Endo/Heme/Allergies: Negative. Psychiatric/Behavioral: Negative for depression and memory loss. The patient does not have insomnia. Physical Exam   Constitutional: She is oriented to person, place, and time. She appears well-developed and well-nourished. HENT:   Head: Normocephalic. Eyes: Conjunctivae and EOM are normal. Pupils are equal, round, and reactive to light. Neck: Normal range of motion. Neck supple. No JVD present. No tracheal deviation present. No thyromegaly present. Cardiovascular: Normal rate, regular rhythm and normal heart sounds. No murmur heard. Pulmonary/Chest: Breath sounds normal.   Abdominal: Soft. Bowel sounds are normal. gestational uterus present   Musculoskeletal: Normal range of motion. Lymphadenopathy:     She has no cervical adenopathy. Neurological: She is alert and oriented to person, place, and time. She has normal reflexes. Skin: Skin is warm. Psychiatric: She has a normal mood and affect.        Lab Results   Component Value Date/Time    Hemoglobin A1c 6.3 10/05/2017 03:09 PM    Hemoglobin A1c 6.5 06/01/2017 03:04 PM    Glucose  09/06/2017 03:52 PM      Lab Results   Component Value Date/Time    TSH 0.717 10/05/2017 03:09 PM          ASSESSMENT and PLAN    Type 2 diabetes complicating pregnancy : a1c is    6.3  %       From     Oct 2017     compared to   5.8 %      From     Sept 2017     comapred to   5.7 %     From     July 2017    Compared to    6.5 %         From      June 2017      Compared to    6.4 %      From     April 28 2017 by finger stick    Compared  to  7.9 %   From finger stick on march 8 2017     She has lost control some   Gestational diabetes ,  on insulin   She needs to  Do more post meal checks  On basal bolus regimen  on metformin  mg 2 pills at dinner   Reviewed the log, fasting sugars are ranging from 100, and several other numbers are over 200 mg   She is told to start checking post meal   More often and to get more compliant with diet   She is asked to increase levemir in AM  She understood the rationale for good control on insulin  She definitely has done well with this regimen  b-fast , lunch and dinner   :   45 gm of carb  Discussed patho-physiology of DM  during pregnancy     She is educated about the importance of glycemic control for healthy baby and safe delivery. She is told to check blood sugars before meals and one hour after meals and at bed time on some days (2 hours post dinner). She is educated about the targets being different during pregnancy   Fasting below 90 mg and one hour post prandial being 130- 140 mg       She is told to drop off her log/meter for review.   She will call office if blood sugars are staying about the target range for 2 consecutive days   She has my cell number     She is educated about possibility of worsening of retinopathy with aggressive glycemic control.       > 50 % of time is spent on counseling   Patient voiced understanding her plan of care

## 2017-10-12 NOTE — MR AVS SNAPSHOT
Visit Information Date & Time Provider Department Dept. Phone Encounter #  
 10/12/2017  3:00 PM Brii Morrow MD [facility] Diabetes [facility] Endocrinology [phone] [id number] Follow-up Instructions Return in about 8 weeks (around 12/7/2017). Upcoming Health Maintenance Date Due  
 LIPID PANEL Q1 1985 FOOT EXAM Q1 7/24/1995 MICROALBUMIN Q1 7/24/1995 EYE EXAM RETINAL OR DILATED Q1 7/24/1995 Pneumococcal 19-64 Medium Risk (1 of 1 - PPSV23) 7/24/2004 DTaP/Tdap/Td series (1 - Tdap) 7/24/2006 PAP AKA CERVICAL CYTOLOGY 7/24/2006 INFLUENZA AGE 9 TO ADULT 8/1/2017 OB 3RD TRIMESTER TDAP 8/12/2017 HEMOGLOBIN A1C Q6M 4/5/2018 Allergies as of 10/12/2017  Review Complete On: 10/12/2017 By: Brii Morrow MD  
  
 Severity Noted Reaction Type Reactions Lisinopril  03/22/2017    Swelling Current Immunizations  Never Reviewed No immunizations on file. Not reviewed this visit You Were Diagnosed With   
  
 Codes Comments Insulin controlled gestational diabetes mellitus (GDM) in third trimester    -  Primary ICD-10-CM: O24.414 ICD-9-CM: 916.11 Vitals BP Pulse Temp Resp Height(growth percentile) Weight(growth percentile) 113/69 (BP 1 Location: Right arm, BP Patient Position: Sitting) 96 98.3 °F (36.8 °C) (Oral) 18 5' 5\" (1.651 m) 204 lb 8 oz (92.8 kg) LMP SpO2 BMI OB Status Smoking Status 02/04/2017 (Exact Date) 97% 34.03 kg/m2 Pregnant Never Smoker BMI and BSA Data Body Mass Index Body Surface Area 34.03 kg/m 2 2.06 m 2 Preferred Pharmacy Pharmacy Name Phone [facility] [address] [phone] Your Updated Medication List  
  
   
This list is accurate as of: 10/12/17  4:37 PM.  Always use your most recent med list.  
  
  
  
  
 aspirin delayed-release 81 mg tablet Take  by mouth daily. * insulin detemir 100 unit/mL (3 mL) Inpn Commonly known as:  LEVEMIR FLEXTOUCH  
sample * insulin detemir 100 unit/mL (3 mL) Inpn Commonly known as:  Claudell Ropes Increase to 62 units at night and 16 units in the morning  
  
 insulin lispro 100 unit/mL kwikpen Commonly known as:  HUMALOG Increase to 9 units before bfast  9 units before lunch and 10 units before dinner plus sliding scale. lancets 33 gauge Misc Commonly known as: One Touch [place] Use 6 times daily. Dx code O23.80  
  
 metFORMIN  mg tablet Commonly known as:  GLUCOPHAGE XR Take 2 Tabs by mouth daily (with dinner). stop glyburide- metformin combo pill  
  
 methyldopa 250 mg tablet Commonly known as:  ALDOMET Take 1 Tab by mouth two (2) times a day. Manjula Pen Needle 32 gauge x 5/32\" Ndle Generic drug:  Insulin Needles (Disposable) USE TO INJECT WITH INSULIN PENS  
  
 ONETOUCH ULTRA TEST strip Generic drug:  glucose blood VI test strips To check 4-6 times a day  
  
 prenatal multivit-ca-min-fe-fa Tab Take  by mouth. * Notice: This list has 2 medication(s) that are the same as other medications prescribed for you. Read the directions carefully, and ask your doctor or other care provider to review them with you. Follow-up Instructions Return in about 8 weeks (around 12/7/2017). Patient Instructions Stay  on metformin er 500 mg 2 pills at dinner Check blood sugars immediately before each meal and at bedtime One hour post meal  
 
 
 Levemir  insulin  62   units  at bed time   And  Increase  levemir in the morning  To  28  units Stop Toujeo Humalog  insulin  Decrease  7  units before breakfast, 9 units before lunch and increase 12 units before dinner. Also, add additional Humalog  as follows with meals  If blood sugars are: 
 
 
150-200 mg 2 units 201-250 mg 4 units 251-300 mg 6 units 301-350 mg 8 units 351-400 mg 10 units 401-450 mg 12 units 451-500 mg 14 units Less than 70 mg NO INSULIN 
 
-------------------------------------------------------------------------------------------------------------------------------------------------------------------------- Do not skip meals Do not eat in between meals Reduce carbs- pasta, rice, potatoes, bread Do not drink juices or sodas Donot eat peanut butter Do not eat sugar free cookies and cakes Do not eat peaches, grapes, watermelon, oranges, pineapples,  And raisins Introducing [facility]! [facility] introduces MailTime patient portal. Now you can access parts of your medical record, email your doctor's office, and request medication refills online. 1. In your internet browser, go to [email or web address] 2. Click on the First Time User? Click Here link in the Sign In box. You will see the New Member Sign Up page. 3. Enter your MailTime Access Code exactly as it appears below. You will not need to use this code after youve completed the sign-up process. If you do not sign up before the expiration date, you must request a new code. · MailTime Access Code: [email or web address] Expires: 10/24/2017  4:26 PM 
 
4. Enter the last four digits of your Social Security Number (xxxx) and Date of Birth (mm/dd/yyyy) as indicated and click Submit. You will be taken to the next sign-up page. 5. Create a MyChart ID. This will be your [email or web address] login ID and cannot be changed, so think of one that is secure and easy to remember. 6. Create a MailTime password. You can change your password at any time. 7. Enter your Password Reset Question and Answer. This can be used at a later time if you forget your password. 8. Enter your e-mail address. You will receive e-mail notification when new information is available in [address]. 9. Click Sign Up. You can now view and download portions of your medical record. 10. Click the Download Summary menu link to download a portable copy of your medical information. If you have questions, please visit the Frequently Asked Questions section of the [employer] website. Remember, [employer] is NOT to be used for urgent needs. For medical emergencies, dial 911. Now available from your iPhone and Android! Please provide this summary of care documentation to your next provider. Your primary care clinician is listed as Sanaz Rojas. If you have any questions after today's visit, please call [phone].

## 2017-10-16 LAB — GRBS, EXTERNAL: NEGATIVE

## 2017-11-04 ENCOUNTER — ANESTHESIA EVENT (OUTPATIENT)
Dept: LABOR AND DELIVERY | Age: 32
End: 2017-11-04
Payer: COMMERCIAL

## 2017-11-04 ENCOUNTER — ANESTHESIA (OUTPATIENT)
Dept: LABOR AND DELIVERY | Age: 32
End: 2017-11-04
Payer: COMMERCIAL

## 2017-11-04 ENCOUNTER — HOSPITAL ENCOUNTER (INPATIENT)
Age: 32
LOS: 2 days | Discharge: HOME | End: 2017-11-07
Attending: OBSTETRICS & GYNECOLOGY | Admitting: OBSTETRICS & GYNECOLOGY
Payer: COMMERCIAL

## 2017-11-04 PROBLEM — Z34.90 PREGNANCY: Status: ACTIVE | Noted: 2017-11-04

## 2017-11-04 LAB
A1 MICROGLOB PLACENTAL VAG QL: POSITIVE
ALBUMIN SERPL-MCNC: 2.4 G/DL (ref 3.5–5)
ALBUMIN/GLOB SERPL: 0.6 {RATIO} (ref 1.1–2.2)
ALP SERPL-CCNC: 163 U/L (ref 45–117)
ALT SERPL-CCNC: 24 U/L (ref 12–78)
ANION GAP SERPL CALC-SCNC: 13 MMOL/L (ref 5–15)
AST SERPL-CCNC: 18 U/L (ref 15–37)
BASOPHILS # BLD: 0 K/UL (ref 0–0.1)
BASOPHILS NFR BLD: 0 % (ref 0–1)
BILIRUB SERPL-MCNC: 0.2 MG/DL (ref 0.2–1)
BUN SERPL-MCNC: 8 MG/DL (ref 6–20)
BUN/CREAT SERPL: 12 (ref 12–20)
CALCIUM SERPL-MCNC: 8.6 MG/DL (ref 8.5–10.1)
CHLORIDE SERPL-SCNC: 105 MMOL/L (ref 97–108)
CO2 SERPL-SCNC: 21 MMOL/L (ref 21–32)
CONTROL LINE PRESENT?: YES
CREAT SERPL-MCNC: 0.65 MG/DL (ref 0.55–1.02)
DAILY QC (YES/NO)?: YES
EOSINOPHIL # BLD: 0.1 K/UL (ref 0–0.4)
EOSINOPHIL NFR BLD: 1 % (ref 0–7)
ERYTHROCYTE [DISTWIDTH] IN BLOOD BY AUTOMATED COUNT: 13.7 % (ref 11.5–14.5)
EXPIRATION DATE: NORMAL
GLOBULIN SER CALC-MCNC: 3.8 G/DL (ref 2–4)
GLUCOSE BLD STRIP.AUTO-MCNC: 100 MG/DL (ref 65–100)
GLUCOSE BLD STRIP.AUTO-MCNC: 71 MG/DL (ref 65–100)
GLUCOSE BLD STRIP.AUTO-MCNC: 83 MG/DL (ref 65–100)
GLUCOSE SERPL-MCNC: 147 MG/DL (ref 65–100)
HCT VFR BLD AUTO: 33.1 % (ref 35–47)
HGB BLD-MCNC: 11.1 G/DL (ref 11.5–16)
INTERNAL NEGATIVE CONTROL: NEGATIVE
KIT LOT NO.: NORMAL
LYMPHOCYTES # BLD: 1.9 K/UL (ref 0.8–3.5)
LYMPHOCYTES NFR BLD: 22 % (ref 12–49)
MCH RBC QN AUTO: 27.7 PG (ref 26–34)
MCHC RBC AUTO-ENTMCNC: 33.5 G/DL (ref 30–36.5)
MCV RBC AUTO: 82.5 FL (ref 80–99)
MONOCYTES # BLD: 0.5 K/UL (ref 0–1)
MONOCYTES NFR BLD: 6 % (ref 5–13)
NEUTS SEG # BLD: 6.2 K/UL (ref 1.8–8)
NEUTS SEG NFR BLD: 71 % (ref 32–75)
PH, VAGINAL FLUID: 5.5 (ref 5–6.1)
PLATELET # BLD AUTO: 394 K/UL (ref 150–400)
POTASSIUM SERPL-SCNC: 4 MMOL/L (ref 3.5–5.1)
PROT SERPL-MCNC: 6.2 G/DL (ref 6.4–8.2)
RBC # BLD AUTO: 4.01 M/UL (ref 3.8–5.2)
SERVICE CMNT-IMP: NORMAL
SODIUM SERPL-SCNC: 139 MMOL/L (ref 136–145)
WBC # BLD AUTO: 8.7 K/UL (ref 3.6–11)

## 2017-11-04 PROCEDURE — 59025 FETAL NON-STRESS TEST: CPT | Performed by: OBSTETRICS & GYNECOLOGY

## 2017-11-04 PROCEDURE — 36415 COLL VENOUS BLD VENIPUNCTURE: CPT | Performed by: OBSTETRICS & GYNECOLOGY

## 2017-11-04 PROCEDURE — 77030014125 HC TY EPDRL BBMI -B: Performed by: ANESTHESIOLOGY

## 2017-11-04 PROCEDURE — 82962 GLUCOSE BLOOD TEST: CPT

## 2017-11-04 PROCEDURE — 99218 HC RM OBSERVATION: CPT

## 2017-11-04 PROCEDURE — 84112 EVAL AMNIOTIC FLUID PROTEIN: CPT | Performed by: OBSTETRICS & GYNECOLOGY

## 2017-11-04 PROCEDURE — 00HU33Z INSERTION OF INFUSION DEVICE INTO SPINAL CANAL, PERCUTANEOUS APPROACH: ICD-10-PCS | Performed by: ANESTHESIOLOGY

## 2017-11-04 PROCEDURE — 74011000250 HC RX REV CODE- 250

## 2017-11-04 PROCEDURE — 51702 INSERT TEMP BLADDER CATH: CPT

## 2017-11-04 PROCEDURE — 75410000002 HC LABOR FEE PER 1 HR: Performed by: OBSTETRICS & GYNECOLOGY

## 2017-11-04 PROCEDURE — 74011250636 HC RX REV CODE- 250/636: Performed by: OBSTETRICS & GYNECOLOGY

## 2017-11-04 PROCEDURE — 80053 COMPREHEN METABOLIC PANEL: CPT | Performed by: OBSTETRICS & GYNECOLOGY

## 2017-11-04 PROCEDURE — 77030034850

## 2017-11-04 PROCEDURE — 74011250636 HC RX REV CODE- 250/636: Performed by: ANESTHESIOLOGY

## 2017-11-04 PROCEDURE — 75810000275 HC EMERGENCY DEPT VISIT NO LEVEL OF CARE

## 2017-11-04 PROCEDURE — 83986 ASSAY PH BODY FLUID NOS: CPT | Performed by: OBSTETRICS & GYNECOLOGY

## 2017-11-04 PROCEDURE — 99283 EMERGENCY DEPT VISIT LOW MDM: CPT | Performed by: OBSTETRICS & GYNECOLOGY

## 2017-11-04 PROCEDURE — 85025 COMPLETE CBC W/AUTO DIFF WBC: CPT | Performed by: OBSTETRICS & GYNECOLOGY

## 2017-11-04 RX ORDER — SODIUM CHLORIDE 0.9 % (FLUSH) 0.9 %
5-10 SYRINGE (ML) INJECTION EVERY 8 HOURS
Status: DISCONTINUED | OUTPATIENT
Start: 2017-11-04 | End: 2017-11-05

## 2017-11-04 RX ORDER — FENTANYL/BUPIVACAINE/NS/PF 2-1250MCG
1-16 PREFILLED PUMP RESERVOIR EPIDURAL CONTINUOUS
Status: DISCONTINUED | OUTPATIENT
Start: 2017-11-04 | End: 2017-11-05

## 2017-11-04 RX ORDER — INSULIN LISPRO 100 [IU]/ML
INJECTION, SOLUTION INTRAVENOUS; SUBCUTANEOUS
Status: DISCONTINUED | OUTPATIENT
Start: 2017-11-04 | End: 2017-11-05

## 2017-11-04 RX ORDER — BUPIVACAINE HYDROCHLORIDE 2.5 MG/ML
INJECTION, SOLUTION EPIDURAL; INFILTRATION; INTRACAUDAL AS NEEDED
Status: DISCONTINUED | OUTPATIENT
Start: 2017-11-04 | End: 2017-11-05 | Stop reason: HOSPADM

## 2017-11-04 RX ORDER — NALOXONE HYDROCHLORIDE 0.4 MG/ML
0.4 INJECTION, SOLUTION INTRAMUSCULAR; INTRAVENOUS; SUBCUTANEOUS AS NEEDED
Status: DISCONTINUED | OUTPATIENT
Start: 2017-11-04 | End: 2017-11-05

## 2017-11-04 RX ORDER — SODIUM CHLORIDE 0.9 % (FLUSH) 0.9 %
5-10 SYRINGE (ML) INJECTION AS NEEDED
Status: DISCONTINUED | OUTPATIENT
Start: 2017-11-04 | End: 2017-11-05

## 2017-11-04 RX ORDER — OXYTOCIN IN 5 % DEXTROSE 30/500 ML
.5-2 PLASTIC BAG, INJECTION (ML) INTRAVENOUS
Status: DISCONTINUED | OUTPATIENT
Start: 2017-11-04 | End: 2017-11-05

## 2017-11-04 RX ORDER — SODIUM CHLORIDE, SODIUM LACTATE, POTASSIUM CHLORIDE, CALCIUM CHLORIDE 600; 310; 30; 20 MG/100ML; MG/100ML; MG/100ML; MG/100ML
125 INJECTION, SOLUTION INTRAVENOUS CONTINUOUS
Status: DISCONTINUED | OUTPATIENT
Start: 2017-11-04 | End: 2017-11-05

## 2017-11-04 RX ORDER — MAGNESIUM SULFATE 100 %
4 CRYSTALS MISCELLANEOUS AS NEEDED
Status: DISCONTINUED | OUTPATIENT
Start: 2017-11-04 | End: 2017-11-05

## 2017-11-04 RX ORDER — DEXTROSE 50 % IN WATER (D50W) INTRAVENOUS SYRINGE
12.5-25 AS NEEDED
Status: DISCONTINUED | OUTPATIENT
Start: 2017-11-04 | End: 2017-11-05

## 2017-11-04 RX ADMIN — Medication 4 MILLI-UNITS/MIN: at 19:13

## 2017-11-04 RX ADMIN — BUPIVACAINE HYDROCHLORIDE 10 ML: 2.5 INJECTION, SOLUTION EPIDURAL; INFILTRATION; INTRACAUDAL at 20:22

## 2017-11-04 RX ADMIN — SODIUM CHLORIDE, SODIUM LACTATE, POTASSIUM CHLORIDE, AND CALCIUM CHLORIDE 1000 ML: 600; 310; 30; 20 INJECTION, SOLUTION INTRAVENOUS at 20:05

## 2017-11-04 RX ADMIN — FENTANYL 0.2 MG/100ML-BUPIV 0.125%-NACL 0.9% EPIDURAL INJ 10 ML/HR: 2/0.125 SOLUTION at 20:58

## 2017-11-04 RX ADMIN — Medication 2 MILLI-UNITS/MIN: at 18:00

## 2017-11-04 NOTE — IP AVS SNAPSHOT
Cayden Lincolnn 
 
 
 [address] [facility] 
[phone] Patient: Sofia Puga MRN: [id number] DTU:9[birth year]11/[birth year]60 My Medications STOP taking these medications   
 insulin detemir 100 unit/mL (3 mL) Inpn Commonly known as:  LEVEMIR FLEXTOUCH  
   
  
 insulin lispro 100 unit/mL kwikpen Commonly known as:  HUMALOG  
   
  
 lancets 33 gauge Misc Commonly known as: One Touch [place]  
   
  
 metFORMIN  mg tablet Commonly known as:  GLUCOPHAGE XR  
Replaced by:  metFORMIN 500 mg tablet Manjula Pen Needle 32 gauge x 5/32\" Ndle Generic drug:  Insulin Needles (Disposable) TAKE these medications as instructed Instructions Each Dose to Equal  
 Morning Noon Evening Bedtime  
 aspirin delayed-release 81 mg tablet Your last dose was: Your next dose is: Take  by mouth daily. ibuprofen 800 mg tablet Commonly known as:  MOTRIN Your last dose was: Your next dose is: Take 1 Tab by mouth every eight (8) hours. 800 mg  
    
   
   
   
  
 metFORMIN 500 mg tablet Commonly known as:  GLUCOPHAGE Your last dose was: Your next dose is: Take 1 Tab by mouth two (2) times daily (with meals). 500 mg  
    
   
   
   
  
 methyldopa 250 mg tablet Commonly known as:  ALDOMET Your last dose was: Your next dose is: Take 1 Tab by mouth two (2) times a day. 250 mg  
    
   
   
   
  
 ONETOUCH ULTRA TEST strip Generic drug:  glucose blood VI test strips Your last dose was: Your next dose is: To check 4-6 times a day  
     
   
   
   
  
 oxyCODONE-acetaminophen 5-325 mg per tablet Commonly known as:  PERCOCET Your last dose was: Your next dose is: Take 1 Tab by mouth every six (6) hours as needed. Max Daily Amount: 4 Tabs. 1 Tab prenatal multivit-ca-min-fe-fa Tab Your last dose was: Your next dose is: Take  by mouth. Where to Get Your Medications Information on where to get these meds will be given to you by the nurse or doctor. ! Ask your nurse or doctor about these medications  
  ibuprofen 800 mg tablet  
 metFORMIN 500 mg tablet  
 oxyCODONE-acetaminophen 5-325 mg per tablet

## 2017-11-04 NOTE — H&P
History & Physical    Name: Desiree May MRN: [id number]  SSN: [id number]    YOB: 1985  Age: 28 y.o. Sex: female        Subjective:     Estimated Date of Delivery: 17  OB History      Para Term  AB Living    1         SAB TAB Ectopic Molar Multiple Live Births                   Ms. Patricia Contreras is admitted with pregnancy at 39w0d for SROM at 11.45 am. Prenatal course was complicated by diabetes - Type 2 and elevated blood pressure in physician's office . Please see prenatal records for details. Past Medical History:   Diagnosis Date    Abnormal Papanicolaou smear of cervix     Asthma     uses inhaler as needed    Essential hypertension     Gestational diabetes      Past Surgical History:   Procedure Laterality Date    HX OTHER SURGICAL      wisdom teeth      Social History     Occupational History    Not on file. Social History Main Topics    Smoking status: Never Smoker    Smokeless tobacco: Never Used    Alcohol use No    Drug use: No    Sexual activity: Yes     Partners: Male     Birth control/ protection: None     Family History   Problem Relation Age of Onset    Diabetes Mother     Diabetes Father     MS Sister        Allergies   Allergen Reactions    Lisinopril Swelling     Prior to Admission medications    Medication Sig Start Date End Date Taking? Authorizing Provider   insulin lispro (HUMALOG) 100 unit/mL kwikpen Change to 7 units before bfast  9 units before lunch and 12 units before dinner plus sliding scale. 10/12/17   Tyler Olivia MD   insulin detemir (LEVEMIR FLEXTOUCH) 100 unit/mL (3 mL) inpn Increase to 62 units at night and 28 units in the morning 10/12/17   Tyler Olivia MD   ROSY PEN NEEDLE 32 gauge x \" ndle USE TO INJECT WITH INSULIN PENS 17   Tyler Olivia MD   lancets (ONE TOUCH [place]ICA) 33 gauge misc Use 6 times daily. Dx code R60.433 7/3/17   Tyler Olivia MD   aspirin delayed-release 81 mg tablet Take  by mouth daily. Historical Provider   metFORMIN ER (GLUCOPHAGE XR) 500 mg tablet Take 2 Tabs by mouth daily (with dinner). stop glyburide- metformin combo pill 4/20/17   Danish Mejias MD   methyldopa (ALDOMET) 250 mg tablet Take 1 Tab by mouth two (2) times a day. 3/28/17   Danish Mejias MD   prenatal multivit-ca-min-fe-fa tab Take  by mouth. Historical Provider   insulin detemir (LEVEMIR FLEXTOUCH) 100 unit/mL (3 mL) inpn sample 3/22/17   Danish Mejias MD   Mancel Grout ULTRA TEST strip To check 4-6 times a day 3/22/17   Danish Mejias MD        Review of Systems: A comprehensive review of systems was negative except for that written in the HPI. Objective:     Vitals:  Vitals:    11/04/17 1511 11/04/17 1516 11/04/17 1517 11/04/17 1521   BP:       Pulse:       SpO2: 96% 97% 91% 98%   Weight:       Height:            Physical Exam:  Patient without distress. Abdomen: soft, nontender  Fundus: soft and non tender  Perineum: blood absent, amniotic fluid present  Cervical Exam: 1 cm dilated    90% effaced    -2 station    Presenting Part: cephalic  Cervical Position: posterior  Consistency: Soft  Lower Extremities:  - Edema 1+  Membranes:  Spontaneous Rupture of Membranes; Amniotic Fluid: clear fluid  Fetal Heart Rate: Reactive    Prenatal Labs:   Lab Results   Component Value Date/Time    Rubella, External Immune 04/03/2017    GrBStrep, External Negative 10/16/2017    HBsAg, External Negative 04/03/2017    HIV, External Negative 04/03/2017    RPR, External Non-reactive 04/03/2017    Gonorrhea, External Negative 04/03/2017    Chlamydia, External Negative 04/03/2017        Assessment/Plan:     Plan: Admit for Reassuring fetal status, pitocin induction of labor. Group B Strep was negative. Pitocin induction discussed. Pt agrees. D/w DR. Barnard and orders taken to manage sugars in labor.     Signed By:  Daniela Eid MD     November 4, 2017

## 2017-11-04 NOTE — PROGRESS NOTES
1415:  The patient presents to labor and delivery complaining of leaking of fluid since 1145 this morning. The patient reports good fetal movements today. She denies contractions or vaginal bleeding. Abdomen palpates soft, nontender. 1500:  Nitrizine is negative. Amnisure is positive. Telephone call to report findings to Dr. Satya Patel. Orders received to admit the patient. 1520:  EFM removed for patient to walk. 1746:  Pt placed back on EFM, Dr. Satya Patel at the bedside to reevaluate. Cervix is unchanged. Will start \"slow\" Pitocin after reactive NST.      1900:  Report given to Lizeth Salazar RN in [employer].

## 2017-11-04 NOTE — PROGRESS NOTES
[address]  Dr Mason Vogel at the bedside to go over the plan of care.   Will start pitocin after a 20 minute reactive NST  Pt aware of plan

## 2017-11-04 NOTE — IP AVS SNAPSHOT
Shelby Pierce 
 
 
 [address] 
[phone] Patient: Obed Hopkins MRN: [id number] ZSM:[id number] About your hospitalization You were admitted on:  November 4, 2017 You last received care in the:  [facility] You were discharged on:  November 7, 2017 Why you were hospitalized Your primary diagnosis was:  Not on File Your diagnoses also included:  Pregnancy Things You Need To Do (next 8 weeks) Follow up with Lyubov Wills MD  
  
Phone:  [phone] Where:  [address], [place] [phone] Discharge Orders None A check marlin indicates which time of day the medication should be taken. My Medications STOP taking these medications   
 insulin detemir 100 unit/mL (3 mL) Inpn Commonly known as:  LEVEMIR FLEXTOUCH  
   
  
 insulin lispro 100 unit/mL kwikpen Commonly known as:  HUMALOG  
   
  
 lancets 33 gauge Misc Commonly known as: One Touch Delica  
   
  
 metFORMIN  mg tablet Commonly known as:  GLUCOPHAGE XR  
Replaced by:  metFORMIN 500 mg tablet Manjula Pen Needle 32 gauge x 5/32\" Ndle Generic drug:  Insulin Needles (Disposable) TAKE these medications as instructed Instructions Each Dose to Equal  
 Morning Noon Evening Bedtime  
 aspirin delayed-release 81 mg tablet Your last dose was: Your next dose is: Take  by mouth daily. ibuprofen 800 mg tablet Commonly known as:  MOTRIN Your last dose was: Your next dose is: Take 1 Tab by mouth every eight (8) hours. 800 mg  
    
   
   
   
  
 metFORMIN 500 mg tablet Commonly known as:  GLUCOPHAGE Your last dose was: Your next dose is: Take 1 Tab by mouth two (2) times daily (with meals). 500 mg  
    
   
   
   
  
 methyldopa 250 mg tablet Commonly known as:  ALDOMET Your last dose was: Your next dose is: Take 1 Tab by mouth two (2) times a day. 250 mg  
    
   
   
   
  
 ONETOUCH ULTRA TEST strip Generic drug:  glucose blood VI test strips Your last dose was: Your next dose is: To check 4-6 times a day  
     
   
   
   
  
 oxyCODONE-acetaminophen 5-325 mg per tablet Commonly known as:  PERCOCET Your last dose was: Your next dose is: Take 1 Tab by mouth every six (6) hours as needed. Max Daily Amount: 4 Tabs. 1 Tab  
    
   
   
   
  
 prenatal multivit-ca-min-fe-fa Tab Your last dose was: Your next dose is: Take  by mouth. Where to Get Your Medications Information on where to get these meds will be given to you by the nurse or doctor. ! Ask your nurse or doctor about these medications  
  ibuprofen 800 mg tablet  
 metFORMIN 500 mg tablet  
 oxyCODONE-acetaminophen 5-325 mg per tablet Discharge Instructions Discharge Instructions for Vaginal Delivery Patient ID: Crystal Arita [id number] 
28 y.o. 
1985 Take Home Medications Continue taking your prenatal vitamins if you are breastfeeding. Follow-up Appointment: 
Follow-up with [facility] in 6 weeks. Follow-up care is a key part of your treatment and safety. Be sure to make and go to all appointments, and call your doctor if you are having problems. Its also a good idea to know your test results and keep a list of the medicines you take. Activity Avoid anything in your vagina for 6 weeks (no intercourse, tampons, or douching). You may drive unless you are taking prescription pain medications. Climbing stairs and light lifting are ok after a vaginal delivery unless your doctor tells you not to. You may gradually work up to exercise over the next few weeks, but take it easy- you'll be tired! Diet 
You may eat a regular diet but you may want to avoid heavy, greasy foods and other foods that could increase constipation. Wound care If you have stitches, continue to rinse with a squirt bottle of warm water each time you use the bathroom for about 2 weeks. Your stitches will gradually dissolve over several weeks. Sitz baths are also helpful to keep the wound clean, encourage healing, and to help with pain associated with the stitches or hemorrhoids. You can use either a sitz bath basin or a bathtub filled with 2-3\" inches of plain warm water. Soak for 10 minutes 3 times a day. Pain Management If you were not given a prescription pain medication, you can take over the counter pain medicines like Tylenol (acetominophen), Advil or Motrin (ibuprofen). You can take acetominophen and ibuprofen together, alternating doses every few hours. You will get the most relief if you take the maximum dose: · Tylenol or acetominophen 1000 mg every 6 hours (equivalent to 2 Extra Strength Tylenols every 6 hours) · Motrin or Advil (generic ibuprofen) 800 mg every 8 hours (4 tablets or capsules every 8 hours) If you were given a prescription pain medication, you can take ibuprofen along with it (doses as above), but not Tylenol. Use ibuprofen as the main medication, and take the prescription medication if needed for more severe pain not relieved by the ibuprofen. Your goal should be to take only the minimum necessary amounts of the prescription medication (narcotic), as these pain medicines can be habit-forming and will worsen or cause constipation. Most patients will find that within a couple of days, their pain is adequately controlled using only over-the-counter medications. A heating pad can also be very helpful for cramping or back pain. Over-the-counter hemorrhoid wipes and ointments are fine to use if you have hemorrhoids. Constipation You may find that bowel movements are irregular after delivery and that you have a tendency to be constipated. If you have stitches (and especially if you had a more severe tear called a third- or fourth-degree), your bowel movements will be more comfortable if they are soft. A stool softener such as Colace (docusate) can safely be taken daily if needed. If you become constipated you can use a laxative such as Dulcolax, Miralax or Milk of Magnesia. Don't wait until constipation is severe- take something sooner rather than later and you will feel much better! Your Recovery: What to Expect at [facility] Delivering a baby is hard work and you probably aren't getting much sleep, so you will certainly be tired. Try to rest when you can and don't worry about doing housework or other tasks which can wait. If you're experiencing soreness or swelling in your bottom, this should improve over the next few days to 2 weeks. You are likely to have some back pain or general body aches or muscle soreness. This should improve with acetominophen or ibuprofen. If your legs were swollen during your pregnancy or as a result of IV fluids given during your hospital stay, this should go away in a few days to a week. Most women experience some form of the \"Baby Blues\" after having a baby. This means that you may feel emotional, tearful, frustrated, anxious, sad, and irritable some of the time. This is normal if it's not severe and should go away after about 2 weeks. Getting as much rest as you can will help. Accept assistance from friends and family members so that you can take breaks from caring for the baby. Call your doctor if your symptoms seem severe, last more than 2 weeks, or seem to be getting worse instead of better. Get help immediately if you have thoughts of wanting to hurt yourself or others! When should you call for help? Call 911 anytime you think you may need emergency care. For example, call if: You pass out (lose consciousness). You have sudden chest pain and shortness of breath, or you cough up blood. You have severe pain in your belly. Call your doctor now or seek immediate medical care if: 
You have heavy vaginal bleeding that soaks one or more pads in an hour, or you have large clots (golf ball size or larger). Your have foul-smelling discharge from your vagina. You are sick to your stomach or cannot keep fluids down. You have pain that does not get better after you take pain medicine. You have signs of infection, such as: Increased pain in your abdomen or vaginal area Red streaks, warmth, or tenderness of your breasts. A fever of 101 or greater (taken by mouth). You have signs of a blood clot, such as: 
Pain in your calf, back of knee, thigh, or groin. Redness and swelling in your leg or groin. You have trouble passing urine or stool, especially if you have pain or swelling in your lower belly; or if you are unable to have a bowel movement after taking a laxative. You have a fast or pounding heartbeat. [employer] Announcement We are excited to announce that we are making your provider's discharge notes available to you in [employer]. You will see these notes when they are completed and signed by the physician that discharged you from your recent hospital stay. If you have any questions or concerns about any information you see in [employer], please call the Health Information Department where you were seen or reach out to your Primary Care Provider for more information about your plan of care. Introducing [facility]! Gm Rodriguez introduces [employer] patient portal. Now you can access parts of your medical record, email your doctor's office, and request medication refills online. 1. In your internet browser, go to [email or web address] 2. Click on the First Time User? Click Here link in the Sign In box.  You will see the New Member Sign Up page. 3. Enter your [employer] Access Code exactly as it appears below. You will not need to use this code after youve completed the sign-up process. If you do not sign up before the expiration date, you must request a new code. · [employer] Access Code: [email or web address] Expires: 2/5/2018  9:36 AM 
 
4. Enter the last four digits of your Social Security Number (xxxx) and Date of Birth (mm/dd/yyyy) as indicated and click Submit. You will be taken to the next sign-up page. 5. Create a [employer] ID. This will be your [employer] login ID and cannot be changed, so think of one that is secure and easy to remember. 6. Create a [employer] password. You can change your password at any time. 7. Enter your Password Reset Question and Answer. This can be used at a later time if you forget your password. 8. Enter your e-mail address. You will receive e-mail notification when new information is available in [address] 9. Click Sign Up. You can now view and download portions of your medical record. 10. Click the Download Summary menu link to download a portable copy of your medical information. If you have questions, please visit the Frequently Asked Questions section of the [employer] website. Remember, [employer] is NOT to be used for urgent needs. For medical emergencies, dial 911. Now available from your iPhone and Android! Providers Seen During Your Hospitalization Provider Specialty Primary office phone Louie Goodman MD Obstetrics & Gynecology [phone] Your Primary Care Physician (PCP) Primary Care Physician Office Phone Office Fax [address], [address] [facility] [phone] You are allergic to the following Allergen Reactions Lisinopril Swelling Recent Documentation Height Weight Breastfeeding? BMI OB Status Smoking Status 1.702 m 91.6 kg Yes 31.64 kg/m2 Recent pregnancy Never Smoker Emergency Contacts Name Discharge Info Relation Home Work Mobile Michael Tatum III DISCHARGE CAREGIVER [3] Yasemin [17] [phone] Patient Belongings The following personal items are in your possession at time of discharge: 
  Dental Appliances: None  Visual Aid: None      Home Medications: None   Jewelry: Earrings, Ring  Clothing: At bedside, Footwear, Pants, Shirt    Other Valuables: None  Personal Items Sent to Safe: none Please provide this summary of care documentation to your next provider. Signatures-by signing, you are acknowledging that this After Visit Summary has been reviewed with you and you have received a copy. Patient Signature:  ____________________________________________________________ Date:  ____________________________________________________________  
  
Nithin Police Provider Signature:  ____________________________________________________________ Date:  ____________________________________________________________

## 2017-11-05 LAB
GLUCOSE BLD STRIP.AUTO-MCNC: 103 MG/DL (ref 65–100)
GLUCOSE BLD STRIP.AUTO-MCNC: 140 MG/DL (ref 65–100)
GLUCOSE BLD STRIP.AUTO-MCNC: 153 MG/DL (ref 65–100)
GLUCOSE BLD STRIP.AUTO-MCNC: 176 MG/DL (ref 65–100)
GLUCOSE BLD STRIP.AUTO-MCNC: 194 MG/DL (ref 65–100)
SERVICE CMNT-IMP: ABNORMAL

## 2017-11-05 PROCEDURE — 75410000000 HC DELIVERY VAGINAL/SINGLE: Performed by: OBSTETRICS & GYNECOLOGY

## 2017-11-05 PROCEDURE — 75410000002 HC LABOR FEE PER 1 HR: Performed by: OBSTETRICS & GYNECOLOGY

## 2017-11-05 PROCEDURE — 77030021125

## 2017-11-05 PROCEDURE — 74011250637 HC RX REV CODE- 250/637: Performed by: OBSTETRICS & GYNECOLOGY

## 2017-11-05 PROCEDURE — 74011250636 HC RX REV CODE- 250/636: Performed by: OBSTETRICS & GYNECOLOGY

## 2017-11-05 PROCEDURE — 82962 GLUCOSE BLOOD TEST: CPT

## 2017-11-05 PROCEDURE — 76060000078 HC EPIDURAL ANESTHESIA: Performed by: ANESTHESIOLOGY

## 2017-11-05 PROCEDURE — 75410000003 HC RECOV DEL/VAG/CSECN EA 0.5 HR: Performed by: OBSTETRICS & GYNECOLOGY

## 2017-11-05 PROCEDURE — 74011250637 HC RX REV CODE- 250/637: Performed by: INTERNAL MEDICINE

## 2017-11-05 PROCEDURE — 74011636637 HC RX REV CODE- 636/637: Performed by: INTERNAL MEDICINE

## 2017-11-05 PROCEDURE — 99218 HC RM OBSERVATION: CPT

## 2017-11-05 PROCEDURE — 65270000029 HC RM PRIVATE

## 2017-11-05 RX ORDER — MAGNESIUM SULFATE 100 %
4 CRYSTALS MISCELLANEOUS AS NEEDED
Status: DISCONTINUED | OUTPATIENT
Start: 2017-11-05 | End: 2017-11-07 | Stop reason: HOSPADM

## 2017-11-05 RX ORDER — DEXTROSE 50 % IN WATER (D50W) INTRAVENOUS SYRINGE
12.5-25 AS NEEDED
Status: DISCONTINUED | OUTPATIENT
Start: 2017-11-05 | End: 2017-11-07 | Stop reason: HOSPADM

## 2017-11-05 RX ORDER — IBUPROFEN 800 MG/1
800 TABLET ORAL EVERY 8 HOURS
Status: DISCONTINUED | OUTPATIENT
Start: 2017-11-05 | End: 2017-11-07 | Stop reason: HOSPADM

## 2017-11-05 RX ORDER — METFORMIN HYDROCHLORIDE 500 MG/1
500 TABLET ORAL 2 TIMES DAILY WITH MEALS
Status: DISCONTINUED | OUTPATIENT
Start: 2017-11-05 | End: 2017-11-07 | Stop reason: HOSPADM

## 2017-11-05 RX ORDER — HYDROCORTISONE ACETATE PRAMOXINE HCL 2.5; 1 G/100G; G/100G
CREAM TOPICAL AS NEEDED
Status: DISCONTINUED | OUTPATIENT
Start: 2017-11-05 | End: 2017-11-07 | Stop reason: HOSPADM

## 2017-11-05 RX ORDER — OXYCODONE AND ACETAMINOPHEN 5; 325 MG/1; MG/1
1 TABLET ORAL
Status: DISCONTINUED | OUTPATIENT
Start: 2017-11-05 | End: 2017-11-07 | Stop reason: HOSPADM

## 2017-11-05 RX ORDER — INSULIN LISPRO 100 [IU]/ML
INJECTION, SOLUTION INTRAVENOUS; SUBCUTANEOUS
Status: DISCONTINUED | OUTPATIENT
Start: 2017-11-05 | End: 2017-11-07 | Stop reason: HOSPADM

## 2017-11-05 RX ORDER — OXYTOCIN/RINGER'S LACTATE 20/1000 ML
125-500 PLASTIC BAG, INJECTION (ML) INTRAVENOUS ONCE
Status: ACTIVE | OUTPATIENT
Start: 2017-11-05 | End: 2017-11-05

## 2017-11-05 RX ORDER — ZOLPIDEM TARTRATE 5 MG/1
5 TABLET ORAL
Status: DISCONTINUED | OUTPATIENT
Start: 2017-11-05 | End: 2017-11-07 | Stop reason: HOSPADM

## 2017-11-05 RX ORDER — NALOXONE HYDROCHLORIDE 0.4 MG/ML
0.4 INJECTION, SOLUTION INTRAMUSCULAR; INTRAVENOUS; SUBCUTANEOUS AS NEEDED
Status: DISCONTINUED | OUTPATIENT
Start: 2017-11-05 | End: 2017-11-07 | Stop reason: HOSPADM

## 2017-11-05 RX ADMIN — INSULIN LISPRO 2 UNITS: 100 INJECTION, SOLUTION INTRAVENOUS; SUBCUTANEOUS at 19:05

## 2017-11-05 RX ADMIN — IBUPROFEN 800 MG: 800 TABLET ORAL at 20:42

## 2017-11-05 RX ADMIN — IBUPROFEN 800 MG: 800 TABLET ORAL at 12:33

## 2017-11-05 RX ADMIN — SODIUM CHLORIDE, SODIUM LACTATE, POTASSIUM CHLORIDE, AND CALCIUM CHLORIDE 125 ML/HR: 600; 310; 30; 20 INJECTION, SOLUTION INTRAVENOUS at 00:08

## 2017-11-05 RX ADMIN — METFORMIN HYDROCHLORIDE 500 MG: 500 TABLET ORAL at 19:05

## 2017-11-05 RX ADMIN — IBUPROFEN 800 MG: 800 TABLET ORAL at 04:26

## 2017-11-05 NOTE — PROGRESS NOTES
Labor Progress Note  Patient seen, fetal heart rate and contraction pattern evaluated, patient examined.       Physical Exam:  Cervical Exam: 2 cm dilated , 80%effaced  Membranes:  PROM  Uterine Activity: Frequency: Every 2-4 minutes  Fetal Heart Rate: Reactive  Station -2    Assessment/Plan:  Reassuring fetal status, patient uncomfortable, desires epidural. continue monitoring  Discussed with pt and family, agree

## 2017-11-05 NOTE — PROGRESS NOTES
36 Pt states that her blood sugar was low. BS checked with results of 81  1563 Bedside, Verbal and Written shift change report given to 1 Domi Lockhart (oncoming nurse) by Michelle (offgoing nurse). Report included the following information SBAR, Intake/Output, MAR and Recent Results.

## 2017-11-05 NOTE — DISCHARGE SUMMARY
Patient ID:  Nathen Coates  [id number]  89 y.o.  1985    Admit Date: 2017    Discharge Date: 17     Admitting Physician: Maribell Garcia MD    Attending Physician: Sherry Taylor MD    Admission Diagnoses: Pregnancy  Pregnancy    Procedures for this admission:     Discharge Diagnoses: Same as above with vaginally producing a viable infant. Information for the patient's :  Mel Alvarez, Male [[id number]]   One Minute Apgar: 5 (Filed from Delivery Summary)  Five  Minute Apgar: 5 (Filed from Delivery Summary)        Discharge Disposition:  home    Discharge Condition:  stable    Additional Diagnoses: diabetes - Type 2 and elevated blood pressure in physician's office . Hypertension    Maternal Labs:   Lab Results   Component Value Date/Time    HBsAg, External Negative 2017    HIV, External Negative 2017    Rubella, External Immune 2017    RPR, External Non-reactive 2017    GrBStrep, External Negative 10/16/2017       Cord Blood Results:   Information for the patient's :  Mel Alvarez, Male [[id number]]   No results found for: PCTABR, ABORH, PCTDIG, BILI, ABORH, ABORHEXT          History of Present Illness:   OB History      Para Term  AB Living    1 1 1   1    SAB TAB Ectopic Molar Multiple Live Births        0 1        Admitted for SROM. Hospital Course:   Patient was admitted as above and delivered via vagina . Please the chart for details. The postpartum course was significant for calf tenderness on PPD#2, dopplers performed. She was deemed stable for discharge home on day 2.     Follow-up Care: 10 days or prn        Signed:  Maribell Garcia MD  2017  11:56 AM

## 2017-11-05 NOTE — ROUTINE PROCESS
SBAR IN Report: BABY    Verbal report received from Juana Boateng RN (full name and credentials) on this patient, being transferred to MIU (unit) for routine progression of care. Report consisted of Situation, Background, Assessment, and Recommendations (SBAR). [place] ID bands were compared with the identification form, and verified with the patient's mother and transferring nurse. Information from the SBAR, Kardex, Intake/Output and MAR and the Spearman Report was reviewed with the transferring nurse. According to the estimated gestational age scale, this infant is 39.1. BETA STREP:   The mother's Group Beta Strep (GBS) result is negative. Prenatal care was received by this patients mother. Opportunity for questions and clarification provided.

## 2017-11-05 NOTE — L&D DELIVERY NOTE
Delivery Summary    Patient: Ling Jones MRN: [id number]  SSN: [id number]    YOB: 1985  Age: 28 y.o. Sex: female        Labor Events:    Labor: No    Rupture Date: 2017    Rupture Time: 11:45 AM    Rupture Type SROM    Amniotic Fluid Volume:      Amniotic Fluid Description:    None    Induction:          Augmentation: Oxytocin    Labor Complications: None     Additional Complications:        Cervical Ripening:       None      Delivery Events:  Episiotomy: Right Mediolateral    Laceration(s):        Repaired: Yes     Number of Repair Packets: 1    Suture Type and Size:    Vicryl  0    Estimated Blood Loss (ml): 300        Information for the patient's newbornJames Pappas, Male [[id number]]     Delivery Summary - Baby    Delivery Date: 2017   Delivery Time: 1:18 AM   Delivery Type: Vaginal, Spontaneous Delivery  Sex:  male  Gestational Age: 36w3d  Delivery Clinician:  Sal Alcazar  Living?: Living   Delivery Location: L&D             APGARS  One minute Five minutes Ten minutes   Skin Color: 1    1       Heart Rate: 2   2         Reflex Irritability: 2   2         Muscle Tone: 2   2       Respiration: 2   2         Total: 9   9           Presentation: Vertex  Position: Left Occiput Anterior  Resuscitation Method:  Tactile Stimulation;Suctioning-bulb     Meconium Stained: None    Cord Information: 3 Vessels   Complications: None  Cord Blood Sent?:  No    Blood Gases Sent?:  No    Placenta:  Date/Time:   1:20 AM  Removal: Spontaneous      Appearance: Normal     [place] Measurements:  Birth Weight:      Birth Length:     Head Circumference:       Chest Circumference:      Abdominal Girth: Other Providers:   INGRID ALCAZAR;TYSON REYNOLDS;ETTA KYLE;KAYODE HSU;LIONEL SAENZ;;;;; Obstetrician;Primary Nurse;Primary  Nurse;Tech;Charge Nurse; Anesthesiologist;Crna;Nurse Practitioner;Midwife;Nursery Nurse           Cord Blood Results:  Information for the patient's :  Maribell Bonner, Male [[id number]]   No results found for: Nguyen Bergera, PCTDIG, BILI, ABORHEXT, [address]    Information for the patient's :  Maribell Bonner, Male [[id number]]   No results found for: APH, APCO2, APO2, AHCO3, ABEC, ABDC, O2ST, [place], [place], PHI, Divya, PO2I, HCO3I, SO2I, IBD     Information for the patient's :  Maribell Bonner, Male [[id number]]   No results found for: EPHV, PCO2V, PO2V, HCO3V, O2STV, EBDV

## 2017-11-05 NOTE — PROGRESS NOTES
TRANSFER - OUT REPORT:    Verbal report given to [facility] RN(name) on Rupinder Chase  being transferred to MIU(unit) for routine progression of care       Verbal report given: Dr Kt Kolb gave order to call Evon (endocrinologist) on day shift @ [phone] for insulin orders. Pt was on a sliding scale during labor(did not receive any due to BS within normal range). Report consisted of patients Situation, Background, Assessment and   Recommendations(SBAR). Information from the following report(s) SBAR and MAR was reviewed with the receiving nurse. Lines:   Peripheral IV 11/04/17 Right Hand (Active)   Site Assessment Clean, dry, & intact 11/5/2017  4:15 AM   Phlebitis Assessment 0 11/5/2017  4:15 AM   Infiltration Assessment 0 11/5/2017  4:15 AM   Dressing Status Clean, dry, & intact 11/5/2017  4:15 AM   Dressing Type Tape;Transparent 11/5/2017  4:15 AM   Hub Color/Line Status Pink;Capped 11/5/2017  4:15 AM   Alcohol Cap Used Yes 11/5/2017  4:15 AM        Opportunity for questions and clarification was provided.       Patient transported with:   Registered Nurse

## 2017-11-05 NOTE — ANESTHESIA PROCEDURE NOTES
Epidural Block    Start time: 11/4/2017 8:13 PM  End time: 11/4/2017 8:26 PM  Performed by: Terrie Quintana  Authorized by: Terrie Quintana     Pre-Procedure  Indication: labor epidural    Preanesthetic Checklist: patient identified, risks and benefits discussed, anesthesia consent, timeout performed and anesthesia consent    Timeout Time: 20:13        Epidural:   Patient position:  Seated  Prep region:  Lumbar  Prep: Betadine    Location:  L3-4    Needle and Epidural Catheter:   Needle Type:  Tuohy  Needle Gauge:  17 G  Injection Technique:  Loss of resistance using air  Attempts:  1  Catheter Size:  18 G  Events: no paresthesia and negative aspiration test    Test Dose:  Lidocaine 1.5% w/ epi and negative    Assessment:   Catheter Secured:  Tegaderm and tape  Insertion:  Uncomplicated  Patient tolerance:  Patient tolerated the procedure well with no immediate complications

## 2017-11-05 NOTE — PROGRESS NOTES
Bedside and Verbal shift change report given to Mateo Wiggins RN (oncoming nurse) by Parul Farias RN (offgoing nurse). Report included the following information SBAR, Kardex, Intake/Output, MAR and Recent Results.

## 2017-11-05 NOTE — ROUTINE PROCESS
Bedside and Verbal shift change report given to Paulie Hines RN (oncoming nurse) by Carolina Cardenas RN (offgoing nurse). Report included the following information SBAR, Kardex, Intake/Output and MAR.

## 2017-11-05 NOTE — PROGRESS NOTES
Spoke with endocrinologist about insulin orders. Dr Natasha Bonilla would like the patient to take Metformin 500mg BID with meals. She would like her blood glucose checked before all meals and at bedtime. She is to receive humalog on a sliding scale to correct sugars during the day. Patient should have no insulin at bedtime. Upon discharge patient should check her sugar BID, once fasting and once before her meal of choice. Patient will not be discharge with any inulin but should continue her metformin at home.

## 2017-11-05 NOTE — ANESTHESIA PREPROCEDURE EVALUATION
Anesthetic History   No history of anesthetic complications            Review of Systems / Medical History  Patient summary reviewed and pertinent labs reviewed    Pulmonary            Asthma        Neuro/Psych   Within defined limits           Cardiovascular    Hypertension                   GI/Hepatic/Renal  Within defined limits              Endo/Other    Diabetes         Other Findings              Physical Exam    Airway  Mallampati: II  TM Distance: 4 - 6 cm  Neck ROM: normal range of motion   Mouth opening: Normal     Cardiovascular  Regular rate and rhythm,  S1 and S2 normal,  no murmur, click, rub, or gallop  Rhythm: regular  Rate: normal         Dental  No notable dental hx       Pulmonary                 Abdominal         Other Findings            Anesthetic Plan    ASA: 2  Anesthesia type: epidural            Anesthetic plan and risks discussed with: Patient      Charting completed after epidural placement.

## 2017-11-06 LAB
GLUCOSE BLD STRIP.AUTO-MCNC: 109 MG/DL (ref 65–100)
GLUCOSE BLD STRIP.AUTO-MCNC: 144 MG/DL (ref 65–100)
GLUCOSE BLD STRIP.AUTO-MCNC: 85 MG/DL (ref 65–100)
SERVICE CMNT-IMP: ABNORMAL
SERVICE CMNT-IMP: ABNORMAL
SERVICE CMNT-IMP: NORMAL

## 2017-11-06 PROCEDURE — 65270000029 HC RM PRIVATE

## 2017-11-06 PROCEDURE — 82962 GLUCOSE BLOOD TEST: CPT

## 2017-11-06 PROCEDURE — 74011250637 HC RX REV CODE- 250/637: Performed by: OBSTETRICS & GYNECOLOGY

## 2017-11-06 PROCEDURE — 74011250637 HC RX REV CODE- 250/637: Performed by: INTERNAL MEDICINE

## 2017-11-06 RX ADMIN — IBUPROFEN 800 MG: 800 TABLET ORAL at 10:30

## 2017-11-06 RX ADMIN — OXYCODONE HYDROCHLORIDE AND ACETAMINOPHEN 1 TABLET: 5; 325 TABLET ORAL at 14:49

## 2017-11-06 RX ADMIN — METFORMIN HYDROCHLORIDE 500 MG: 500 TABLET ORAL at 17:34

## 2017-11-06 RX ADMIN — OXYCODONE HYDROCHLORIDE AND ACETAMINOPHEN 1 TABLET: 5; 325 TABLET ORAL at 20:03

## 2017-11-06 RX ADMIN — IBUPROFEN 800 MG: 800 TABLET ORAL at 04:35

## 2017-11-06 RX ADMIN — OXYCODONE HYDROCHLORIDE AND ACETAMINOPHEN 1 TABLET: 5; 325 TABLET ORAL at 10:30

## 2017-11-06 RX ADMIN — OXYCODONE HYDROCHLORIDE AND ACETAMINOPHEN 1 TABLET: 5; 325 TABLET ORAL at 00:32

## 2017-11-06 RX ADMIN — IBUPROFEN 800 MG: 800 TABLET ORAL at 20:03

## 2017-11-06 RX ADMIN — METFORMIN HYDROCHLORIDE 500 MG: 500 TABLET ORAL at 10:30

## 2017-11-06 RX ADMIN — OXYCODONE HYDROCHLORIDE AND ACETAMINOPHEN 1 TABLET: 5; 325 TABLET ORAL at 05:21

## 2017-11-06 NOTE — LACTATION NOTE
This note was copied from a baby's chart. Discussed with mother her plan for feeding. Reviewed the benefits of exclusive breast milk feeding during the hospital stay. Informed her of the risks of using formula to supplement in the first few days of life as well as the benefits of successful breast milk feeding; referred her to the Breastfeeding booklet about this information. She acknowledges understanding of information reviewed and states that it is her plan to BF her infant. Will support her choice and offer additional information as needed. Pt will successfully establish breastfeeding by feeding in response to early feeding cues   or wake every 3h, will obtain deep latch, and will keep log of feedings/output. Taught to BF at hunger cues and or q 2-3 hrs and to offer 10-20 drops of hand expressed colostrum at any non-feeds. Breast Assessment  Left Breast: Large  Left Nipple: Everted, Intact, Short (Pliable areola, practiced ways to offer the breast)  Right Breast: Large  Right Nipple: Everted, Intact, Short  Breast- Feeding Assessment  Attends Breast-Feeding Classes: Yes  Breast-Feeding Experience: No  Breast Trauma/Surgery: No  Type/Quality: Attempted (Normal  BF behaviors reviewed, aware to offer breast at hunger cues + or q 2-3 hrs and as a way of avoiding extended periods of non-feeding, mom to taught to offer 10+ drops of hand expressed colostrum q 2-3 hrs)  Lactation Consultant Visits  Breast-Feedings: Attempted breast-feeding  Mother/Infant Observation  Mother Observation: Breast comfortable  Infant Observation:  (Infant drowsily resting S2S at breast, not displaying hunger cues, infant mouths/holds nipple in mouth, as mom hand expresses drops)  LATCH Documentation  Latch:  Too sleepy or reluctant, no latch achieved  Audible Swallowing: None  Type of Nipple: Everted (after stimulation)  Comfort (Breast/Nipple): Soft/non-tender  Hold (Positioning): Full assist, staff holds infant at breast (Pratcied football hold + Biological Nurturing BF positioning)  LATCH Score: 4  Biological Nurturing breastfeeding principles taught. How Biological Nurturing (BN)  promotes optimal breastfeeding (BF) sessions discussed. Mother encouraged to seek comfortable semi-reclining breastfeeding positions. Infant placed frontally along maternal contour. Primitive innate feeding reflexes/behaviors of the  discussed. BN tips and techniques shared; assisted with comfortable breastfeeding positioning. Hand Expression Education:  Mom taught how to manually hand express her colostrum. Emphasized the importance of providing infant with valuable colostrum as infant rests skin to skin at breast.  Aware to avoid extended periods of non-feeding. Aware to offer 10-20+ drops of colostrum every 2-3 hours until infant is latching and nursing effectively. Taught the rationale behind this low tech but highly effective evidence based practice.

## 2017-11-06 NOTE — ROUTINE PROCESS
Bedside and Verbal shift change report given to GELY Nayak RN (oncoming nurse) by Saundra Colindres RN (offgoing nurse). Report included the following information SBAR, Kardex, Intake/Output, MAR and Accordion.

## 2017-11-06 NOTE — LACTATION NOTE
This note was copied from a baby's chart. [facility] visit today, infant quiet, alert, not displaying hunger cues, infant positioned at breast in football hold, no cueing noted. Dyad transitioned to side lying position, infant still resting drowsily resting at breast. Mom begins offering hand expressed drops of colostrum. Post circumcision BF behaviors discussed, mom encouraged to give 10 or more hand expressed drops every 2-3 hours until infant awakens and resumes effectively feeding at breast.  Mom demonstrates gained skill of hand expression.

## 2017-11-06 NOTE — PROGRESS NOTES
PostPartum Note    Elodia Melchor  [id number]  1985  28 y.o.    S:  Ms. Elodia Melchor is a 28 y.o.  PPD #1 s/p TSVD @ 39w1d. Doing well. She had a baby boy. Her lochia is like a period. She describes her pain as mild and is well controlled with PO medications. She is breast feeding and this is going well. She is ambulating and voiding. Tolerating PO intake. O:   Visit Vitals    /85 (BP 1 Location: Right arm, BP Patient Position: At rest)    Pulse 80    Temp 98.6 °F (37 °C)    Resp 18    Ht 5' 7\" (1.702 m)    Wt 91.6 kg (202 lb)    LMP 2017 (Exact Date)    SpO2 (!) 88%    Breastfeeding Yes    BMI 31.64 kg/m2       Lab Results   Component Value Date/Time    WBC 8.7 2017 03:22 PM    HGB 11.1 2017 03:22 PM    HCT 33.1 2017 03:22 PM    PLATELET 533  03:22 PM    MCV 82.5 2017 03:22 PM       Gen - No acute distress  Abdomen - Fundus firm, below the umbilicus   Ext - Warm, well perfused. Nontender    A/P:  PPD #2 s/p TSVD @ 39w1d doing well. 1.  Routine PP instructions/ care discussed  2. Blood type - Rh +  3. Rubella imm  4. Circumcision desired   5. Discharge home PPD2  6. F/U 4-6 weeks for PP check.       Fadia Brown MD  [facility]

## 2017-11-07 VITALS
HEART RATE: 75 BPM | SYSTOLIC BLOOD PRESSURE: 127 MMHG | OXYGEN SATURATION: 88 % | HEIGHT: 67 IN | DIASTOLIC BLOOD PRESSURE: 75 MMHG | TEMPERATURE: 97.8 F | WEIGHT: 202 LBS | BODY MASS INDEX: 31.71 KG/M2 | RESPIRATION RATE: 16 BRPM

## 2017-11-07 LAB
GLUCOSE BLD STRIP.AUTO-MCNC: 116 MG/DL (ref 65–100)
SERVICE CMNT-IMP: ABNORMAL

## 2017-11-07 PROCEDURE — 82962 GLUCOSE BLOOD TEST: CPT

## 2017-11-07 PROCEDURE — 74011250637 HC RX REV CODE- 250/637: Performed by: OBSTETRICS & GYNECOLOGY

## 2017-11-07 PROCEDURE — 77030021125

## 2017-11-07 PROCEDURE — 93970 EXTREMITY STUDY: CPT

## 2017-11-07 PROCEDURE — 74011250637 HC RX REV CODE- 250/637: Performed by: INTERNAL MEDICINE

## 2017-11-07 RX ORDER — IBUPROFEN 800 MG/1
800 TABLET ORAL EVERY 8 HOURS
Qty: 21 TAB | Refills: 1 | Status: SHIPPED | OUTPATIENT
Start: 2017-11-07 | End: 2017-11-16 | Stop reason: ALTCHOICE

## 2017-11-07 RX ORDER — METFORMIN HYDROCHLORIDE 500 MG/1
500 TABLET ORAL 2 TIMES DAILY WITH MEALS
Qty: 60 TAB | Refills: 1 | Status: SHIPPED | OUTPATIENT
Start: 2017-11-07 | End: 2017-11-16 | Stop reason: ALTCHOICE

## 2017-11-07 RX ORDER — OXYCODONE AND ACETAMINOPHEN 5; 325 MG/1; MG/1
1 TABLET ORAL
Qty: 15 TAB | Refills: 0 | Status: SHIPPED | OUTPATIENT
Start: 2017-11-07 | End: 2017-11-16 | Stop reason: ALTCHOICE

## 2017-11-07 RX ADMIN — METFORMIN HYDROCHLORIDE 500 MG: 500 TABLET ORAL at 09:42

## 2017-11-07 RX ADMIN — OXYCODONE HYDROCHLORIDE AND ACETAMINOPHEN 1 TABLET: 5; 325 TABLET ORAL at 12:13

## 2017-11-07 RX ADMIN — OXYCODONE HYDROCHLORIDE AND ACETAMINOPHEN 1 TABLET: 5; 325 TABLET ORAL at 02:33

## 2017-11-07 RX ADMIN — IBUPROFEN 800 MG: 800 TABLET ORAL at 05:54

## 2017-11-07 NOTE — PROGRESS NOTES
Post-Partum Day Number 2 Progress/Discharge Note    Patient doing well post-partum without significant complaint. No HA, vision changes, etc.     Vitals:  Patient Vitals for the past 8 hrs:   BP Temp Pulse Resp   17 0600 127/75 97.8 °F (36.6 °C) 75 16     Temp (24hrs), Av °F (36.7 °C), Min:97.8 °F (36.6 °C), Max:98.4 °F (36.9 °C)      Vital signs stable, afebrile. Exam:  Patient without distress. Abdomen soft, fundus firm below umbilicus, non tender                              Lower extremities are negative for swelling, cords. Tenderness in left upper calf    Lab/Data Review: All lab results for the last 24 hours reviewed. Assessment and Plan:  Patient appears to be having uncomplicated post-partum course. Continue routine perineal care and maternal education. Plan discharge for today with follow up in our office in 7-10 days. 2. CHTN--resume aldomet at home, precautions reviewed  3. Pregestational DM--rx for metformin, f/u with endocrinologist soon  4.  Left calf tenderness--dopplers prior to discharge

## 2017-11-07 NOTE — PROCEDURES
[address]  *** FINAL REPORT ***    Name: Karo Kern  MRN: [id number]    Inpatient  : 1985  HIS Order #: [id number]  [address] [facility] Visit #: [id number]  Date: 2017    TYPE OF TEST: Peripheral Venous Testing    REASON FOR TEST  bilateral calf tenderness    Right Leg:-  Deep venous thrombosis:           No  Superficial venous thrombosis:    No  Deep venous insufficiency:        No  Superficial venous insufficiency: No    Left Leg:-  Deep venous thrombosis:           No  Superficial venous thrombosis:    No  Deep venous insufficiency:        No  Superficial venous insufficiency: No      INTERPRETATION/FINDINGS  PROCEDURE:  BILATERAL LE VENOUS DUPLEX. Evaluation of lower extremity veins with ultrasound (B-mode imaging,  pulsed Doppler, color Doppler). Includes the common femoral, deep  femoral, femoral, popliteal, posterior tibial, peroneal, and great  saphenous veins. FINDINGS:  Savannah Spillers scale and color flow duplex images of the veins in  both lower extremities demonstrate normal compressibility, spontaneous   and augmented flow profiles, and absence of filling defects  throughout the deep and superficial veins in both lower extremities. CONCLUSION:  Bilateral lower extremity venous duplex negative for deep   venous thrombosis or thrombophlebitis. ADDITIONAL COMMENTS    I have personally reviewed the data relevant to the interpretation of  this  study. TECHNOLOGIST: Carlyn Bautista  Signed: 2017 10:36 AM    PHYSICIAN: Pj Felder.  Terrence Casas MD  Signed: 2017 06:55 AM

## 2017-11-07 NOTE — DISCHARGE INSTRUCTIONS
Discharge Instructions for Vaginal Delivery    Patient ID:  Susie Arteaga  [id number]  42 y.o.  1985    Take Home Medications           Continue taking your prenatal vitamins if you are breastfeeding. Follow-up Appointment:  Follow-up with vpfw in 6 weeks. Follow-up care is a key part of your treatment and safety. Be sure to make and go to all appointments, and call your doctor if you are having problems. Its also a good idea to know your test results and keep a list of the medicines you take. Activity  Avoid anything in your vagina for 6 weeks (no intercourse, tampons, or douching). You may drive unless you are taking prescription pain medications. Climbing stairs and light lifting are ok after a vaginal delivery unless your doctor tells you not to. You may gradually work up to exercise over the next few weeks, but take it easy- you'll be tired! Diet  You may eat a regular diet but you may want to avoid heavy, greasy foods and other foods that could increase constipation. Wound care  If you have stitches, continue to rinse with a squirt bottle of warm water each time you use the bathroom for about 2 weeks. Your stitches will gradually dissolve over several weeks. Sitz baths are also helpful to keep the wound clean, encourage healing, and to help with pain associated with the stitches or hemorrhoids. You can use either a sitz bath basin or a bathtub filled with 2-3\" inches of plain warm water. Soak for 10 minutes 3 times a day. Pain Management  If you were not given a prescription pain medication, you can take over the counter pain medicines like Tylenol (acetominophen), Advil or Motrin (ibuprofen). You can take acetominophen and ibuprofen together, alternating doses every few hours.   You will get the most relief if you take the maximum dose:  · Tylenol or acetominophen 1000 mg every 6 hours (equivalent to 2 Extra Strength Tylenols every 6 hours)  · Motrin or Advil (generic ibuprofen) 800 mg every 8 hours (4 tablets or capsules every 8 hours)  If you were given a prescription pain medication, you can take ibuprofen along with it (doses as above), but not Tylenol. Use ibuprofen as the main medication, and take the prescription medication if needed for more severe pain not relieved by the ibuprofen. Your goal should be to take only the minimum necessary amounts of the prescription medication (narcotic), as these pain medicines can be habit-forming and will worsen or cause constipation. Most patients will find that within a couple of days, their pain is adequately controlled using only over-the-counter medications. A heating pad can also be very helpful for cramping or back pain. Over-the-counter hemorrhoid wipes and ointments are fine to use if you have hemorrhoids. Constipation  You may find that bowel movements are irregular after delivery and that you have a tendency to be constipated. If you have stitches (and especially if you had a more severe tear called a third- or fourth-degree), your bowel movements will be more comfortable if they are soft. A stool softener such as Colace (docusate) can safely be taken daily if needed. If you become constipated you can use a laxative such as Dulcolax, Miralax or Milk of Magnesia. Don't wait until constipation is severe- take something sooner rather than later and you will feel much better! Your Recovery: What to Expect at Home  Delivering a baby is hard work and you probably aren't getting much sleep, so you will certainly be tired. Try to rest when you can and don't worry about doing housework or other tasks which can wait. If you're experiencing soreness or swelling in your bottom, this should improve over the next few days to 2 weeks. You are likely to have some back pain or general body aches or muscle soreness. This should improve with acetominophen or ibuprofen.   If your legs were swollen during your pregnancy or as a result of IV fluids given during your hospital stay, this should go away in a few days to a week. Most women experience some form of the \"Baby Blues\" after having a baby. This means that you may feel emotional, tearful, frustrated, anxious, sad, and irritable some of the time. This is normal if it's not severe and should go away after about 2 weeks. Getting as much rest as you can will help. Accept assistance from friends and family members so that you can take breaks from caring for the baby. Call your doctor if your symptoms seem severe, last more than 2 weeks, or seem to be getting worse instead of better. Get help immediately if you have thoughts of wanting to hurt yourself or others! When should you call for help? Call 911 anytime you think you may need emergency care. For example, call if:  You pass out (lose consciousness). You have sudden chest pain and shortness of breath, or you cough up blood. You have severe pain in your belly. Call your doctor now or seek immediate medical care if:  You have heavy vaginal bleeding that soaks one or more pads in an hour, or you have large clots (golf ball size or larger). Your have foul-smelling discharge from your vagina. You are sick to your stomach or cannot keep fluids down. You have pain that does not get better after you take pain medicine. You have signs of infection, such as: Increased pain in your abdomen or vaginal area  Red streaks, warmth, or tenderness of your breasts. A fever of 101 or greater (taken by mouth). You have signs of a blood clot, such as:  Pain in your calf, back of knee, thigh, or groin. Redness and swelling in your leg or groin. You have trouble passing urine or stool, especially if you have pain or swelling in your lower belly; or if you are unable to have a bowel movement after taking a laxative. You have a fast or pounding heartbeat.

## 2017-11-07 NOTE — ROUTINE PROCESS
Bedside and Verbal shift change report given to Linda Palomino RN  (oncoming nurse) by Veverrenata Juárez RN (offgoing nurse). Report included the following information SBAR, Kardex, Intake/Output, MAR and Recent Results.

## 2017-11-07 NOTE — LACTATION NOTE
This note was copied from a baby's chart. Reviewed breastfeeding basics:  How milk is made and normal  breastfeeding behaviors discussed. Supply and demand,  stomach size, early feeding cues, skin to skin bonding with comfortable positioning and baby led latch-on reviewed. How to identify signs of successful breastfeeding sessions reviewed; education on assymetrical latch, signs of effective latching vs shallow, in-effective latching, normal  feeding frequency and duration and expected infant output discussed. Normal course of breastfeeding discussed including the AAP's recommendation that children receive exclusive breast milk feedings for the first six months of life with breast milk feedings to continue through the first year of life and/or beyond as complimentary table foods are added. Breastfeeding Booklet and Warm line information provided with discussion. Discussed typical  weight loss and the importance of pediatrician appointment within 24-48 hours of discharge, at 2 weeks of life and normalcy of requesting pediatric weight checks as needed in between visits. Pt will successfully establish breastfeeding by feeding in response to early feeding cues   or wake every 3h, will obtain deep latch, and will keep log of feedings/output. Taught to BF at hunger cues and or q 2-3 hrs and to offer 10-20 drops of hand expressed colostrum at any non-feeds.       Breast Assessment  Left Breast: Large, Engorged  Left Nipple: Everted, Flat, Short, Intact  Right Breast: Large  Right Nipple: Everted, Flat, Short, Intact (Rationale for use of shield at this time discussed.)  Breast- Feeding Assessment  Attends Breast-Feeding Classes: Yes  Breast-Feeding Experience: No  Breast Trauma/Surgery: No  Type/Quality: Good (LC asssited dyad w/comfortable side lying BF positioning, infant latches suckles rhyhmically then slides off breast, breasts are filling, shield use suggested after 4th break in latch, with shield infant suckles deeply with increased length of feeding)  Lactation Consultant Visits  Breast-Feedings: Good  (Mom verbalizes + demonstrates gained BF skills )  Mother/Infant Observation  Mother Observation: Alignment, Breast comfortable, Lets baby end feeding, Nipple round on release, Recognizes feeding cues, Sleepy after feeding (Discussed with parents how to ID markers of milk exchange, signs of a successful BF session as well as current  BF behaviors along with anticipatory guidance)  Infant Observation: Feeding cues, Latches nipple and aereolae, Rhythmic suck, Relaxed after feeding, Opens mouth, Lips flanged, upper, Lips flanged, lower, Frenulum checked  LATCH Documentation  Latch: Grasps breast, tongue down, lips flanged, rhythmic sucking  Audible Swallowing: A few with stimulation  Type of Nipple: Everted (after stimulation)  Comfort (Breast/Nipple): Filling, red/small blisters/bruises, mild/mod discomfort  Hold (Positioning): Full assist, teach one side, mother does other, staff holds (Plans for home discussed, anticipatory guidance shared)  LATCH Score: 7     Discussed infant's feeding behaviors today as well as anticipatory guidance for this first week and beyond. Discussed infant feeding with vigor 8-12 or more time in each 24 hours, discussed that if infant is not effectively feeding 8-12 x day that the addition of pumping and feeding all EBM to infant would be appropriate. To see pediatrician tomorrow.

## 2017-11-07 NOTE — PROGRESS NOTES
200 Phoned Dr. Jayme Keita with preliminary bilateral lower extremity venous duplex negative for deep venous thrombosis or thrombophlebitis. Order received to discharge patient. 1315 Discharge completed. Awaiting transportation. 36 Informed Dr. Jayme Keita final result should be up tomorrow per ultrasound. 1330 Pt off unit in stable condition via wheelchair with volunteers for discharge home per Dr. Jayme Keita. Pt is to follow-up in 10 days and is aware. Prescriptions given to pt. Pt denies any HA, Dizziness, N/V or pain at this time. Infant in car seat with mother.

## 2017-11-12 DIAGNOSIS — O24.419 GESTATIONAL DIABETES MELLITUS (GDM) IN FIRST TRIMESTER, GESTATIONAL DIABETES METHOD OF CONTROL UNSPECIFIED: ICD-10-CM

## 2017-11-12 RX ORDER — METFORMIN HYDROCHLORIDE 500 MG/1
TABLET, EXTENDED RELEASE ORAL
Qty: 60 TAB | Refills: 6 | Status: SHIPPED | OUTPATIENT
Start: 2017-11-12 | End: 2017-11-13 | Stop reason: SDUPTHER

## 2017-11-13 RX ORDER — METHYLDOPA 250 MG/1
250 TABLET, FILM COATED ORAL 2 TIMES DAILY
Qty: 60 TAB | Refills: 6 | Status: SHIPPED | OUTPATIENT
Start: 2017-11-13 | End: 2018-03-02 | Stop reason: SDUPTHER

## 2017-11-13 RX ORDER — METFORMIN HYDROCHLORIDE 500 MG/1
TABLET, EXTENDED RELEASE ORAL
Qty: 60 TAB | Refills: 6 | Status: SHIPPED | OUTPATIENT
Start: 2017-11-13 | End: 2017-12-20 | Stop reason: ALTCHOICE

## 2017-11-16 ENCOUNTER — OFFICE VISIT (OUTPATIENT)
Dept: ENDOCRINOLOGY | Age: 32
End: 2017-11-16

## 2017-11-16 VITALS
OXYGEN SATURATION: 97 % | WEIGHT: 188.7 LBS | HEIGHT: 67 IN | DIASTOLIC BLOOD PRESSURE: 87 MMHG | HEART RATE: 95 BPM | TEMPERATURE: 98 F | SYSTOLIC BLOOD PRESSURE: 139 MMHG | RESPIRATION RATE: 18 BRPM | BODY MASS INDEX: 29.62 KG/M2

## 2017-11-16 DIAGNOSIS — Z79.4 UNCONTROLLED TYPE 2 DIABETES MELLITUS WITH HYPERGLYCEMIA, WITH LONG-TERM CURRENT USE OF INSULIN (HCC): Primary | ICD-10-CM

## 2017-11-16 DIAGNOSIS — E11.65 UNCONTROLLED TYPE 2 DIABETES MELLITUS WITH HYPERGLYCEMIA, WITH LONG-TERM CURRENT USE OF INSULIN (HCC): Primary | ICD-10-CM

## 2017-11-16 NOTE — MR AVS SNAPSHOT
Visit Information Date & Time Provider Department Dept. Phone Encounter #  
 11/16/2017  1:30 PM Nanette Raines MD [facility] End[facility]ology [phone] [id number] Follow-up Instructions Return in about 3 months (around 2/16/2018). Upcoming Health Maintenance Date Due  
 LIPID PANEL Q1 1985 FOOT EXAM Q1 7/24/1995 MICROALBUMIN Q1 7/24/1995 EYE EXAM RETINAL OR DILATED Q1 7/24/1995 Pneumococcal 19-64 Medium Risk (1 of 1 - PPSV23) 7/24/2004 DTaP/Tdap/Td series (1 - Tdap) 7/24/2006 PAP AKA CERVICAL CYTOLOGY 7/24/2006 Influenza Age 5 to Adult 8/1/2017 HEMOGLOBIN A1C Q6M 4/5/2018 Allergies as of 11/16/2017  Review Complete On: 11/16/2017 By: Nanette Raines MD  
  
 Severity Noted Reaction Type Reactions Lisinopril  03/22/2017    Swelling Current Immunizations  Reviewed on 11/6/2017 No immunizations on file. Not reviewed this visit You Were Diagnosed With   
  
 Codes Comments Uncontrolled type 2 diabetes mellitus with hyperglycemia, with long-term current use of insulin (HCC)    -  Primary ICD-10-CM: E11.65, Z79.4 ICD-9-CM: 250.02, V58.67 Vitals BP Pulse Temp Resp Height(growth percentile) Weight(growth percentile) 139/87 (BP 1 Location: Right arm, BP Patient Position: Sitting) 95 98 °F (36.7 °C) (Oral) 18 5' 7\" (1.702 m) 188 lb 11.2 oz (85.6 kg) LMP SpO2 BMI OB Status Smoking Status 02/04/2017 (Exact Date) 97% 29.55 kg/m2 Recent pregnancy Never Smoker BMI and BSA Data Body Mass Index Body Surface Area  
 29.55 kg/m 2 2.01 m 2 Preferred Pharmacy Pharmacy Name Phone [facility] [address] [phone] Your Updated Medication List  
  
   
This list is accurate as of: 11/16/17  2:37 PM.  Always use your most recent med list.  
  
  
  
  
 aspirin delayed-release 81 mg tablet Take  by mouth daily. metFORMIN  mg tablet Commonly known as:  GLUCOPHAGE XR  
TAKE 2 TABLETS BY MOUTH DAILY WITH DINNER. STOP GLYBURIDE- METFORMIN COMBO PILL  
  
 * methyldopa 250 mg tablet Commonly known as:  ALDOMET Take 1 Tab by mouth two (2) times a day. * methyldopa 250 mg tablet Commonly known as:  ALDOMET  
TAKE 1 TABLET BY MOUTH TWICE DAILY  
  
 ONETOUCH ULTRA TEST strip Generic drug:  glucose blood VI test strips To check 4-6 times a day  
  
 prenatal multivit-ca-min-fe-fa Tab Take  by mouth. * Notice: This list has 2 medication(s) that are the same as other medications prescribed for you. Read the directions carefully, and ask your doctor or other care provider to review them with you. Follow-up Instructions Return in about 3 months (around 2/16/2018). Patient Instructions Start back  On   metformin ER  500 mg  One    Or   2 pills at dinner Stay on methyldopa  
 
-------------------------------------------------------------------------------- Check blood sugars only twice a day by rotation as follows First day - before b-fast and - before lunch Second day- before dinner and  before bed time After 2 days go back to same rotation 
 
 
 
-------------------------------------------------------------------------------------------------------------------------------------------------------------------------- Do not skip meals Do not eat in between meals Reduce carbs- pasta, rice, potatoes, bread Do not drink juices or sodas Donot eat peanut butter Do not eat sugar free cookies and cakes Do not eat peaches, grapes, watermelon, oranges, pineapples,  And raisins Introducing [facility]! Tiago Schwarz introduces [employer] patient portal. Now you can access parts of your medical record, email your doctor's office, and request medication refills online.    
 
1. In your internet browser, go to [email or web address] 2. Click on the First Time User? Click Here link in the Sign In box. You will see the New Member Sign Up page. 3. Enter your [email or web address] Access Code exactly as it appears below. You will not need to use this code after youve completed the sign-up process. If you do not sign up before the expiration date, you must request a new code. · [email or web address] Access Code: [email or web address] Expires: 2/5/2018  9:36 AM 
 
4. Enter the last four digits of your Social Security Number (xxxx) and Date of Birth (mm/dd/yyyy) as indicated and click Submit. You will be taken to the next sign-up page. 5. Create a [email or web address] ID. This will be your [email or web address] login ID and cannot be changed, so think of one that is secure and easy to remember. 6. Create a [email or web address] password. You can change your password at any time. 7. Enter your Password Reset Question and Answer. This can be used at a later time if you forget your password. 8. Enter your e-mail address. You will receive e-mail notification when new information is available in [address] 9. Click Sign Up. You can now view and download portions of your medical record. 10. Click the Download Summary menu link to download a portable copy of your medical information. If you have questions, please visit the Frequently Asked Questions section of the [email or web address] website. Remember, [email or web address] is NOT to be used for urgent needs. For medical emergencies, dial 911. Now available from your iPhone and Android! Please provide this summary of care documentation to your next provider. Your primary care clinician is listed as Zina Henry. If you have any questions after today's visit, please call [phone].

## 2017-11-16 NOTE — PROGRESS NOTES
HISTORY OF PRESENT ILLNESS  Bridget Dominguez is a 28 y.o. female. HPI  Patient is here for f/u post  DM,   And   DM type 2  From Oct 12  2017   She delivered the baby on 2017         Lost 20  lbs   She is off  insulins     She is breast feeding and she is giving supplements   She is diet compliant     She is on metformin plain        Old history :    She  is referred by Dr. Florencio Fragoso    H/o DM 2  For 12 years   Usually controlled well until she got pregnant  She says       pcp ( Dr. Alonso Hall ) stopped  Medications - janumet xr she said after pregnancy was found out   Since, pt has noted severely high sugars and is very concerned       [place] one    Para 0   0 and she is through 6 weeks of second gestation. LMP : 2017   ELISEO : 2017     Current monitoring regimen: home blood tests - 3 times daily  Home blood sugar records: trend: fluctuating a lot  Any episodes of hypoglycemia? no      Current diabetic medications include got onto toujeo and stopped janmet xr recently . Eye exam current (within one year): yes  Weight trend: fluctuating a bit  Prior visit with dietician: no  Current diet: \"unhealthy\" diet in general  Current exercise: no regular exercise      Mother is diabetic, on insulin , type not sure         Past Medical History:   Diagnosis Date    Abnormal Papanicolaou smear of cervix     Asthma     uses inhaler as needed    Essential hypertension     Gestational diabetes        Social History     Social History    Marital status: SINGLE     Spouse name: N/A    Number of children: N/A    Years of education: N/A     Occupational History    Not on file.      Social History Main Topics    Smoking status: Never Smoker    Smokeless tobacco: Never Used    Alcohol use No    Drug use: No    Sexual activity: Yes     Partners: Male     Birth control/ protection: None     Other Topics Concern    Not on file     Social History Narrative       Family History   Problem Relation Age of Onset    Diabetes Mother    Buddy Avila Diabetes Father     MS Sister              Review of Systems   Constitutional: Negative. HENT: Negative. Eyes: Negative for pain and redness. Respiratory: Negative. Cardiovascular: Negative for chest pain, palpitations and leg swelling. Gastrointestinal: Negative. Negative for constipation. Genitourinary: Negative. Musculoskeletal: Negative for myalgias. Skin: Negative. Neurological: Negative. Endo/Heme/Allergies: Negative. Psychiatric/Behavioral: Negative for depression and memory loss. The patient does not have insomnia. Physical Exam   Constitutional: She is oriented to person, place, and time. She appears well-developed and well-nourished. HENT:   Head: Normocephalic. Eyes: Conjunctivae and EOM are normal. Pupils are equal, round, and reactive to light. Neck: Normal range of motion. Neck supple. No JVD present. No tracheal deviation present. No thyromegaly present. Cardiovascular: Normal rate, regular rhythm and normal heart sounds. No murmur heard. Pulmonary/Chest: Breath sounds normal.   Abdominal: Soft. Bowel sounds are normal.  Musculoskeletal: Normal range of motion. Lymphadenopathy:     She has no cervical adenopathy. Neurological: She is alert and oriented to person, place, and time. She has normal reflexes. Skin: Skin is warm. Psychiatric: She has a normal mood and affect.        Lab Results   Component Value Date/Time    Hemoglobin A1c 6.3 10/05/2017 03:09 PM    Hemoglobin A1c 6.5 06/01/2017 03:04 PM    Glucose 147 11/04/2017 03:22 PM    Glucose (POC) 116 11/07/2017 06:00 AM    Creatinine 0.65 11/04/2017 03:22 PM      Lab Results   Component Value Date/Time    TSH 0.717 10/05/2017 03:09 PM          ASSESSMENT and PLAN    Type 2 diabetes uncontrolled : a1c is    6.3  %       From     Oct 2017     compared to   5.8 %      From     Sept 2017     comapred to   5.7 %     From     July 2017    Compared to    6.5 % From      June 2017      Compared to    6.4 %      From     April 28 2017 by finger stick    Compared  to  7.9 %   From finger stick on march 8 2017     Log is good  OFF insulin  on metformin  Plain  500 mg 1 pill  In  AM  And  dinner   Reviewed the log, fasting sugars are ranging from 100, and several other numbers are over  140 mg   AWAIT  Future labs bnv  She is educated about possibility of worsening of retinopathy with aggressive glycemic control.       > 50 % of time is spent on counseling   Patient voiced understanding her plan of care

## 2017-11-16 NOTE — PROGRESS NOTES
Chief Complaint   Patient presents with    Diabetes     1. Have you been to the ER, urgent care clinic since your last visit? Hospitalized since your last visit? yes, two weeks, child birth    2. Have you seen or consulted any other health care providers outside of the [address] [place] [address] since your last visit? Include any pap smears or colon screening.  No    Wt Readings from Last 3 Encounters:   11/16/17 188 lb 11.2 oz (85.6 kg)   11/04/17 202 lb (91.6 kg)   10/12/17 204 lb 8 oz (92.8 kg)     Temp Readings from Last 3 Encounters:   11/16/17 98 °F (36.7 °C) (Oral)   11/07/17 97.8 °F (36.6 °C)   10/12/17 98.3 °F (36.8 °C) (Oral)     BP Readings from Last 3 Encounters:   11/16/17 139/87   11/07/17 127/75   10/12/17 113/69     Pulse Readings from Last 3 Encounters:   11/16/17 95   11/07/17 75   10/12/17 96     Pt has meter  No foot or eye exam

## 2017-11-16 NOTE — PATIENT INSTRUCTIONS
Start back  On   metformin ER  500 mg  One    Or   2 pills at dinner       Stay on methyldopa     --------------------------------------------------------------------------------      Check blood sugars only twice a day by rotation as follows    First day - before b-fast and - before lunch  Second day- before dinner and  before bed time    After 2 days go back to same rotation        --------------------------------------------------------------------------------------------------------------------------------------------------------------------------    Do not skip meals  Do not eat in between meals    Reduce carbs- pasta, rice, potatoes, bread   Do not drink juices or sodas  Donot eat peanut butter     Do not eat sugar free cookies and cakes   Do not eat peaches, grapes, watermelon, oranges, pineapples,  And raisins

## 2017-12-19 NOTE — TELEPHONE ENCOUNTER
Spoke with patient and her blood sugars are as follows:  12/4 1202 148  12/5 1640 120  12/7 1352 162  12/9 0230 146  12/16 2324 167  12/17 1821 328  12/18 0145 304 0445 85  12/19 1148 182  Patient states she has increased carb intake and has been drinking more juice but not everyday. Patient states she has been sick and taking OTC cold medicine. Please advise.

## 2017-12-20 RX ORDER — GLIPIZIDE 5 MG/1
TABLET ORAL
Qty: 60 TAB | Refills: 6 | Status: SHIPPED | OUTPATIENT
Start: 2017-12-20 | End: 2018-05-04 | Stop reason: ALTCHOICE

## 2017-12-20 NOTE — TELEPHONE ENCOUNTER
This pt called for plan for diabetes post pregnancy     Tell her that will trial out oral meds and she has to wait for these meds to kick in - might take 2 -3 weeks     I want her to start on janumet xr 50/1000 twice a day and stop metformin   Glipizide 5 mg twice a day , 20 min before b-fast and dinner ( can drop to low sugars ) use caution  Do not take it if you are not eating   Cut the pill to half if eating lesser than normal   Do not avoid lunches       There are more meds, but they require face to face discussion, so will change meds again at next visit   Do not drink jucies and restrict carbs as a generalized rule life long , does nto have to restrict carbs as much like in pregnancy and goals of blood sugars while not pregnant are different, need to talk at visit or you can help her

## 2017-12-21 ENCOUNTER — TELEPHONE (OUTPATIENT)
Dept: ENDOCRINOLOGY | Age: 32
End: 2017-12-21

## 2018-03-02 ENCOUNTER — OFFICE VISIT (OUTPATIENT)
Dept: ENDOCRINOLOGY | Age: 33
End: 2018-03-02

## 2018-03-02 VITALS
DIASTOLIC BLOOD PRESSURE: 76 MMHG | HEIGHT: 67 IN | HEART RATE: 80 BPM | RESPIRATION RATE: 18 BRPM | TEMPERATURE: 97.5 F | WEIGHT: 174 LBS | SYSTOLIC BLOOD PRESSURE: 124 MMHG | BODY MASS INDEX: 27.31 KG/M2 | OXYGEN SATURATION: 99 %

## 2018-03-02 DIAGNOSIS — E11.65 UNCONTROLLED TYPE 2 DIABETES MELLITUS WITH HYPERGLYCEMIA, WITHOUT LONG-TERM CURRENT USE OF INSULIN (HCC): Primary | ICD-10-CM

## 2018-03-02 DIAGNOSIS — I10 ESSENTIAL HYPERTENSION: ICD-10-CM

## 2018-03-02 RX ORDER — NORETHINDRONE ACETATE AND ETHINYL ESTRADIOL 1; .02 MG/1; MG/1
TABLET ORAL
COMMUNITY
End: 2019-03-07 | Stop reason: ALTCHOICE

## 2018-03-02 RX ORDER — FLUCONAZOLE 150 MG/1
150 TABLET ORAL DAILY
Qty: 2 TAB | Refills: 3 | Status: SHIPPED | OUTPATIENT
Start: 2018-03-02 | End: 2018-03-04

## 2018-03-02 RX ORDER — LOSARTAN POTASSIUM 50 MG/1
50 TABLET ORAL DAILY
Qty: 30 TAB | Refills: 6 | Status: SHIPPED | OUTPATIENT
Start: 2018-03-02 | End: 2018-08-15 | Stop reason: SDUPTHER

## 2018-03-02 NOTE — PROGRESS NOTES
HISTORY OF PRESENT ILLNESS  Damian Browne is a 28 y.o. female. HPI  Patient is here for f/u   DM type 2  From 2017   She delivered the baby boy on 2017     Lost 14  lbs   She is off  insulins     She is not breast feeding any more   She is diet compliant     She is on janumet and glipizide         Old history :    She  is referred by Dr. Brenton Lewis    H/o DM 2  For 12 years   Usually controlled well until she got pregnant  She says       pcp ( Dr. Silvia Mena ) stopped  Medications - janumet xr she said after pregnancy was found out   Since, pt has noted severely high sugars and is very concerned       [place] one    Para 0   0 and she is through 6 weeks of second gestation. LMP : 2017   ELISEO : 2017     Current monitoring regimen: home blood tests - 3 times daily  Home blood sugar records: trend: fluctuating a lot  Any episodes of hypoglycemia? no      Current diabetic medications include got onto toujeo and stopped janmet xr recently . Eye exam current (within one year): yes  Weight trend: fluctuating a bit  Prior visit with dietician: no  Current diet: \"unhealthy\" diet in general  Current exercise: no regular exercise      Mother is diabetic, on insulin , type not sure         Past Medical History:   Diagnosis Date    Abnormal Papanicolaou smear of cervix     Asthma     uses inhaler as needed    Essential hypertension     Gestational diabetes        Social History     Social History    Marital status: SINGLE     Spouse name: N/A    Number of children: N/A    Years of education: N/A     Occupational History    Not on file.      Social History Main Topics    Smoking status: Never Smoker    Smokeless tobacco: Never Used    Alcohol use No    Drug use: No    Sexual activity: Yes     Partners: Male     Birth control/ protection: None     Other Topics Concern    Not on file     Social History Narrative       Family History   Problem Relation Age of Onset    Diabetes Mother  Diabetes Father     MS Sister              Review of Systems   Constitutional: Negative. HENT: Negative. Eyes: Negative for pain and redness. Respiratory: Negative. Cardiovascular: Negative for chest pain, palpitations and leg swelling. Gastrointestinal: Negative. Negative for constipation. Genitourinary: Negative. Musculoskeletal: Negative for myalgias. Skin: Negative. Neurological: Negative. Endo/Heme/Allergies: Negative. Psychiatric/Behavioral: Negative for depression and memory loss. The patient does not have insomnia. Physical Exam   Constitutional: She is oriented to person, place, and time. She appears well-developed and well-nourished. HENT:   Head: Normocephalic. Eyes: Conjunctivae and EOM are normal. Pupils are equal, round, and reactive to light. Neck: Normal range of motion. Neck supple. No JVD present. No tracheal deviation present. No thyromegaly present. Cardiovascular: Normal rate, regular rhythm and normal heart sounds. No murmur heard. Pulmonary/Chest: Breath sounds normal.   Abdominal: Soft. Bowel sounds are normal.  Musculoskeletal: Normal range of motion. Lymphadenopathy:     She has no cervical adenopathy. Neurological: She is alert and oriented to person, place, and time. She has normal reflexes. Skin: Skin is warm. Psychiatric: She has a normal mood and affect.    Diabetic foot exam: march 2018    Left: Reflexes nd     Vibratory sensation normal    Proprioception nd   Sharp/dull discrimination nd    Filament test normal sensation with micro filament   Pulse DP: 2+ (normal)   Pulse PT: nd   Deformities: None  Right: Reflexes nd   Vibratory sensation normal   Proprioception nd   Sharp/dull discrimination nd   Filament test normal sensation with micro filament   Pulse DP: 2+ (normal)   Pulse PT: nd   Deformities: None      Lab Results   Component Value Date/Time    Hemoglobin A1c 6.5 (H) 02/20/2018 08:25 AM    Hemoglobin A1c 6.3 (H) 10/05/2017 03:09 PM    Hemoglobin A1c 6.5 (H) 06/01/2017 03:04 PM    Glucose 83 02/20/2018 08:25 AM    Glucose (POC) 116 (H) 11/07/2017 06:00 AM    LDL, calculated 59 02/20/2018 08:25 AM    Creatinine 0.62 02/20/2018 08:25 AM      Lab Results   Component Value Date/Time    TSH 0.717 10/05/2017 03:09 PM          ASSESSMENT and PLAN    Type 2 diabetes uncontrolled : a1c is   6.5 %    From feb 2018   comapred to   6.3  %       From     Oct 2017     compared to   5.8 %      From     Sept 2017     comapred to   5.7 %     From     July 2017    Compared to    6.5 %         From      June 2017      Compared to    6.4 %      From     April 28 2017 by finger stick    Compared  to  7.9 %   From finger stick on march 8 2017     Log is good  On janumet xr and glipizide   Will try inj incretin byetta and jardiance   Discussed med side effects  She is educated about possibility of worsening of retinopathy with aggressive glycemic control. 2. htn : will start on ACEI, BP well controlled       3.  Hyperlipidemia : lipids are normal        > 50 % of time is spent on counseling   Patient voiced understanding her plan of care

## 2018-03-02 NOTE — PATIENT INSTRUCTIONS
janumet  xr  50/1000 mg  One pill twice a day with meals      Use glipizide per your discretion  Glipizide 5 mg twice a day , twenty min before b-fast and dinner    Start on bydureon b-cise 2 mg  once a week     Start on jardiance 25 mg a day before b-fast  ( drink plenty of water )    Diflucan 150 mg one pill a day  (  3 refills )      Stop  methyldopa   Start on  Cozaar 50 mg a day     --------------------------------------------------------------------------------      Check blood sugars only twice a day by rotation as follows    First day - before b-fast and - before lunch  Second day- before dinner and  before bed time    After 2 days go back to same rotation        --------------------------------------------------------------------------------------------------------------------------------------------------------------------------    Do not skip meals  Do not eat in between meals    Reduce carbs- pasta, rice, potatoes, bread   Do not drink juices or sodas  Donot eat peanut butter     Do not eat sugar free cookies and cakes   Do not eat peaches, grapes, watermelon, oranges, pineapples,  And raisins

## 2018-03-02 NOTE — MR AVS SNAPSHOT
[address] [facility] [address] 
[phone] Patient: Kris Gordillo MRN: [id number] EER:[birth year] Visit Information Date & Time Provider Department Dept. Phone Encounter #  
 3/2/2018 10:45 AM Neri Carr MD [facility] [phone] [id number] Follow-up Instructions Return in about 2 months (around 5/2/2018). Upcoming Health Maintenance Date Due  
 FOOT EXAM Q1 7/24/1995 EYE EXAM RETINAL OR DILATED Q1 7/24/1995 Pneumococcal 19-64 Medium Risk (1 of 1 - PPSV23) 7/24/2004 DTaP/Tdap/Td series (1 - Tdap) 7/24/2006 PAP AKA CERVICAL CYTOLOGY 7/24/2006 Influenza Age 5 to Adult 8/1/2017 HEMOGLOBIN A1C Q6M 8/20/2018 MICROALBUMIN Q1 2/20/2019 LIPID PANEL Q1 2/20/2019 Allergies as of 3/2/2018  Review Complete On: 3/2/2018 By: Neri Carr MD  
  
 Severity Noted Reaction Type Reactions Lisinopril  03/22/2017    Swelling Current Immunizations  Reviewed on 11/6/2017 No immunizations on file. Not reviewed this visit You Were Diagnosed With   
  
 Codes Comments Uncontrolled type 2 diabetes mellitus with hyperglycemia, without long-term current use of insulin ([facility]    -  Primary ICD-10-CM: E11.65 ICD-9-CM: 250.02 Essential hypertension     ICD-10-CM: I10 
ICD-9-CM: 401.9 Vitals BP Pulse Temp Resp Height(growth percentile) Weight(growth percentile) 124/76 (BP 1 Location: Right arm, BP Patient Position: Sitting) 80 97.5 °F (36.4 °C) (Oral) 18 5' 7\" (1.702 m) 174 lb (78.9 kg) LMP SpO2 Breastfeeding? BMI OB Status Smoking Status 02/15/2018 99% No 27.25 kg/m2 Having regular periods Never Smoker BMI and BSA Data Body Mass Index Body Surface Area  
 27.25 kg/m 2 1.93 m 2 Preferred Pharmacy Pharmacy Name Phone  [id number] - [place], [place] - [address] [place] [address] AT [address] [place] [address] [address] [phone] Your Updated Medication List  
  
   
This list is accurate as of 3/2/18 11:48 AM.  Always use your most recent med list.  
  
  
  
  
 aspirin delayed-release 81 mg tablet Take  by mouth daily. glipiZIDE 5 mg tablet Commonly known as:  Marcy Forrest Take 1 tablet twenty minutes before breakfast and dinner  
  
 norethindrone-ethinyl estradiol 1-20 mg-mcg Tab Commonly known as:  MICROGESTIN 1/20 Take  by mouth. ONETOUCH ULTRA TEST strip Generic drug:  glucose blood VI test strips To check 4-6 times a day  
  
 prenatal multivit-ca-min-fe-fa Tab Take  by mouth. SITagliptin-metFORMIN 50-1,000 mg Tm24 Commonly known as:  JANUMET XR Take 1 Tab by mouth two (2) times daily (with meals). Follow-up Instructions Return in about 2 months (around 5/2/2018). Patient Instructions   
 
 
 
 janumet  xr  50/1000 mg  One pill twice a day with meals Use glipizide per your discretion Glipizide 5 mg twice a day , twenty min before b-fast and dinner Start on bydureon b-cise 2 mg  once a week Start on jardiance 25 mg a day before b-fast  ( drink plenty of water ) Diflucan 150 mg one pill a day  (  3 refills ) Stop  methyldopa Start on  Cozaar 50 mg a day  
 
-------------------------------------------------------------------------------- Check blood sugars only twice a day by rotation as follows First day - before b-fast and - before lunch Second day- before dinner and  before bed time After 2 days go back to same rotation 
 
 
 
-------------------------------------------------------------------------------------------------------------------------------------------------------------------------- Do not skip meals Do not eat in between meals Reduce carbs- pasta, rice, potatoes, bread Do not drink juices or sodas Donot eat peanut butter Do not eat sugar free cookies and cakes Do not eat peaches, grapes, watermelon, oranges, pineapples,  And raisins Introducing [facility]! Rayne Lu introduces [employer] patient portal. Now you can access parts of your medical record, email your doctor's office, and request medication refills online. 1. In your internet browser, go to [email or web address] 2. Click on the First Time User? Click Here link in the Sign In box. You will see the New Member Sign Up page. 3. Enter your [employer] Access Code exactly as it appears below. You will not need to use this code after youve completed the sign-up process. If you do not sign up before the expiration date, you must request a new code. · [employer] Access Code: [email or web address] Expires: 5/31/2018 11:48 AM 
 
4. Enter the last four digits of your Social Security Number (xxxx) and Date of Birth (mm/dd/yyyy) as indicated and click Submit. You will be taken to the next sign-up page. 5. Create a [employer] ID. This will be your [employer] login ID and cannot be changed, so think of one that is secure and easy to remember. 6. Create a [employer] password. You can change your password at any time. 7. Enter your Password Reset Question and Answer. This can be used at a later time if you forget your password. 8. Enter your e-mail address. You will receive e-mail notification when new information is available in [address] 9. Click Sign Up. You can now view and download portions of your medical record. 10. Click the Download Summary menu link to download a portable copy of your medical information. If you have questions, please visit the Frequently Asked Questions section of the [employer] website. Remember, [employer] is NOT to be used for urgent needs. For medical emergencies, dial 911. Now available from your iPhone and Android! Please provide this summary of care documentation to your next provider. Your primary care clinician is listed as Tj Hernandez. If you have any questions after today's visit, please call [phone].

## 2018-03-02 NOTE — PROGRESS NOTES
Wt Readings from Last 3 Encounters:   03/02/18 174 lb (78.9 kg)   11/16/17 188 lb 11.2 oz (85.6 kg)   11/04/17 202 lb (91.6 kg)     Temp Readings from Last 3 Encounters:   03/02/18 97.5 °F (36.4 °C) (Oral)   11/16/17 98 °F (36.7 °C) (Oral)   11/07/17 97.8 °F (36.6 °C)     BP Readings from Last 3 Encounters:   03/02/18 124/76   11/16/17 139/87   11/07/17 127/75     Pulse Readings from Last 3 Encounters:   03/02/18 80   11/16/17 95   11/07/17 75     Lab Results   Component Value Date/Time    Hemoglobin A1c 6.5 (H) 02/20/2018 08:25 AM    Hemoglobin A1c (POC) 5.8 09/06/2017 03:53 PM     Need eye exam referral

## 2018-03-05 ENCOUNTER — TELEPHONE (OUTPATIENT)
Dept: ENDOCRINOLOGY | Age: 33
End: 2018-03-05

## 2018-03-05 DIAGNOSIS — Z79.4 UNCONTROLLED TYPE 2 DIABETES MELLITUS WITH HYPERGLYCEMIA, WITH LONG-TERM CURRENT USE OF INSULIN (HCC): Primary | ICD-10-CM

## 2018-03-05 DIAGNOSIS — E11.65 UNCONTROLLED TYPE 2 DIABETES MELLITUS WITH HYPERGLYCEMIA, WITH LONG-TERM CURRENT USE OF INSULIN (HCC): Primary | ICD-10-CM

## 2018-03-06 LAB
ALBUMIN/CREAT UR: 14.8 MG/G CREAT (ref 0–30)
CREAT UR-MCNC: 267.6 MG/DL
MICROALBUMIN UR-MCNC: 39.6 UG/ML

## 2018-04-08 DIAGNOSIS — O24.419 GESTATIONAL DIABETES MELLITUS (GDM) IN FIRST TRIMESTER, GESTATIONAL DIABETES METHOD OF CONTROL UNSPECIFIED: ICD-10-CM

## 2018-04-08 RX ORDER — BLOOD SUGAR DIAGNOSTIC
STRIP MISCELLANEOUS
Qty: 200 STRIP | Refills: 6 | Status: SHIPPED | OUTPATIENT
Start: 2018-04-08 | End: 2022-04-05 | Stop reason: SDUPTHER

## 2018-05-04 ENCOUNTER — OFFICE VISIT (OUTPATIENT)
Dept: ENDOCRINOLOGY | Age: 33
End: 2018-05-04

## 2018-05-04 VITALS
HEIGHT: 67 IN | WEIGHT: 169.3 LBS | SYSTOLIC BLOOD PRESSURE: 119 MMHG | TEMPERATURE: 98.6 F | RESPIRATION RATE: 18 BRPM | BODY MASS INDEX: 26.57 KG/M2 | HEART RATE: 97 BPM | OXYGEN SATURATION: 98 % | DIASTOLIC BLOOD PRESSURE: 64 MMHG

## 2018-05-04 DIAGNOSIS — Z79.4 UNCONTROLLED TYPE 2 DIABETES MELLITUS WITH HYPERGLYCEMIA, WITH LONG-TERM CURRENT USE OF INSULIN (HCC): Primary | ICD-10-CM

## 2018-05-04 DIAGNOSIS — E11.65 UNCONTROLLED TYPE 2 DIABETES MELLITUS WITH HYPERGLYCEMIA, WITH LONG-TERM CURRENT USE OF INSULIN (HCC): Primary | ICD-10-CM

## 2018-05-04 DIAGNOSIS — I10 ESSENTIAL HYPERTENSION: ICD-10-CM

## 2018-05-04 DIAGNOSIS — E78.2 MIXED HYPERLIPIDEMIA: ICD-10-CM

## 2018-05-04 LAB — HBA1C MFR BLD HPLC: 6.4 %

## 2018-05-04 RX ORDER — FLUCONAZOLE 150 MG/1
150 TABLET ORAL DAILY
Qty: 1 TAB | Refills: 1 | Status: SHIPPED | OUTPATIENT
Start: 2018-05-04 | End: 2018-05-05

## 2018-05-04 NOTE — PROGRESS NOTES
HISTORY OF PRESENT ILLNESS  Tiffany Sierra is a 28 y.o. female. HPI  Patient is here for f/u   DM type 2  From 2018     She could not tolerate bydureon ( lost more weight )  She stopped     She had jardiance  Taken and is tolerating It   She had yeast infection      Old hsitory :  She delivered the baby boy on 2017     Lost 14  lbs   She is off  insulins     She is not breast feeding any more   She is diet compliant     She is on janumet and glipizide         Old history :    She  is referred by Dr. Lima Irene    H/o DM 2  For 12 years   Usually controlled well until she got pregnant  She says       pcp ( Dr. Milo Thomason ) stopped  Medications - janumet xr she said after pregnancy was found out   Since, pt has noted severely high sugars and is very concerned       [place] one    Para 0   0 and she is through 6 weeks of second gestation. LMP : 2017   ELISEO : 2017     Current monitoring regimen: home blood tests - 3 times daily  Home blood sugar records: trend: fluctuating a lot  Any episodes of hypoglycemia? no      Current diabetic medications include got onto toujeo and stopped janmet xr recently . Eye exam current (within one year): yes  Weight trend: fluctuating a bit  Prior visit with dietician: no  Current diet: \"unhealthy\" diet in general  Current exercise: no regular exercise      Mother is diabetic, on insulin , type not sure         Past Medical History:   Diagnosis Date    Abnormal Papanicolaou smear of cervix     Asthma     uses inhaler as needed    Essential hypertension     Gestational diabetes        Social History     Social History    Marital status: SINGLE     Spouse name: N/A    Number of children: N/A    Years of education: N/A     Occupational History    Not on file.      Social History Main Topics    Smoking status: Never Smoker    Smokeless tobacco: Never Used    Alcohol use No    Drug use: No    Sexual activity: Yes     Partners: Male     Birth control/ protection: None     Other Topics Concern    Not on file     Social History Narrative       Family History   Problem Relation Age of Onset    Diabetes Mother    [facility] Diabetes Father     MS Sister              Review of Systems   Constitutional: Negative. HENT: Negative. Eyes: Negative for pain and redness. Respiratory: Negative. Cardiovascular: Negative for chest pain, palpitations and leg swelling. Gastrointestinal: Negative. Negative for constipation. Genitourinary: Negative. Musculoskeletal: Negative for myalgias. Skin: Negative. Neurological: Negative. Endo/Heme/Allergies: Negative. Psychiatric/Behavioral: Negative for depression and memory loss. The patient does not have insomnia. Physical Exam   Constitutional: She is oriented to person, place, and time. She appears well-developed and well-nourished. HENT:   Head: Normocephalic. Eyes: Conjunctivae and EOM are normal. Pupils are equal, round, and reactive to light. Neck: Normal range of motion. Neck supple. No JVD present. No tracheal deviation present. No thyromegaly present. Cardiovascular: Normal rate, regular rhythm and normal heart sounds. No murmur heard. Pulmonary/Chest: Breath sounds normal.   Abdominal: Soft. Bowel sounds are normal.  Musculoskeletal: Normal range of motion. Lymphadenopathy:     She has no cervical adenopathy. Neurological: She is alert and oriented to person, place, and time. She has normal reflexes. Skin: Skin is warm. Psychiatric: She has a normal mood and affect.    Diabetic foot exam: march 2018    Left: Reflexes nd     Vibratory sensation normal    Proprioception nd   Sharp/dull discrimination nd    Filament test normal sensation with micro filament   Pulse DP: 2+ (normal)   Pulse PT: nd   Deformities: None  Right: Reflexes nd   Vibratory sensation normal   Proprioception nd   Sharp/dull discrimination nd   Filament test normal sensation with micro filament   Pulse DP: 2+ (normal)   Pulse PT: nd   Deformities: None      Lab Results   Component Value Date/Time    Hemoglobin A1c 6.5 (H) 02/20/2018 08:25 AM    Hemoglobin A1c 6.3 (H) 10/05/2017 03:09 PM    Hemoglobin A1c 6.5 (H) 06/01/2017 03:04 PM    Glucose 83 02/20/2018 08:25 AM    Glucose (POC) 116 (H) 11/07/2017 06:00 AM    Microalb/Creat ratio (ug/mg creat.) 14.8 03/05/2018 04:22 PM    LDL, calculated 59 02/20/2018 08:25 AM    Creatinine 0.62 02/20/2018 08:25 AM      Lab Results   Component Value Date/Time    TSH 0.717 10/05/2017 03:09 PM          ASSESSMENT and PLAN    Type 2 diabetes uncontrolled : a1c is   6.5 %    From feb 2018   comapred to   6.3  %       From     Oct 2017     compared to   5.8 %      From     Sept 2017     comapred to   5.7 %     From     July 2017    Compared to    6.5 %         From      June 2017      Compared to    6.4 %      From     April 28 2017 by finger stick    Compared  to  7.9 %   From finger stick on march 8 2017     Log is good  On janumet xr and  jardiance  She is off  glipizide per my advise   She stopped bydureon     Discussed med side effects  Again today   She is educated about possibility of worsening of retinopathy with aggressive glycemic control. 2. htn : losatan 50 mg a day , BP well controlled       3.  Hyperlipidemia : lipids are normal        > 50 % of time is spent on counseling   Patient voiced understanding her plan of care

## 2018-05-04 NOTE — PATIENT INSTRUCTIONS
janumet  xr  50/1000 mg  One pill twice a day with meals      Stopped glipizide      Stop  bydureon b-cise 2 mg  once a week     Stay on  jardiance 25 mg a day before b-fast  ( drink plenty of water )    Diflucan 150 mg one pill a day  (  2 refills )        Stay on  Cozaar 50 mg a day     --------------------------------------------------------------------------------      Check blood sugars only twice a day by rotation as follows    First day - before b-fast and - before lunch  Second day- before dinner and  before bed time    After 2 days go back to same rotation        --------------------------------------------------------------------------------------------------------------------------------------------------------------------------    Do not skip meals  Do not eat in between meals    Reduce carbs- pasta, rice, potatoes, bread   Do not drink juices or sodas  Donot eat peanut butter     Do not eat sugar free cookies and cakes   Do not eat peaches, grapes, watermelon, oranges, pineapples,  And raisins

## 2018-05-04 NOTE — PROGRESS NOTES
Chief Complaint   Patient presents with    Diabetes     2 month follow up     1. Have you been to the ER, urgent care clinic since your last visit? Hospitalized since your last visit? YES [facility] was diagnosed with sinus infection and right ear pain. 2. Have you seen or consulted any other health care providers outside of the [address] [place] [address] since your last visit? Include any pap smears or colon screening. YES [facility] 4/30/[facility].   Wt Readings from Last 3 Encounters:   05/04/18 169 lb 4.8 oz (76.8 kg)   03/02/18 174 lb (78.9 kg)   11/16/17 188 lb 11.2 oz (85.6 kg)     Temp Readings from Last 3 Encounters:   05/04/18 98.6 °F (37 °C) (Oral)   03/02/18 97.5 °F (36.4 °C) (Oral)   11/16/17 98 °F (36.7 °C) (Oral)     BP Readings from Last 3 Encounters:   05/04/18 119/64   03/02/18 124/76   11/16/17 139/87     Pulse Readings from Last 3 Encounters:   05/04/18 97   03/02/18 80   11/16/17 95

## 2018-05-04 NOTE — LETTER
NOTIFICATION RETURN TO WORK / SCHOOL 
 
5/4/2018 3:39 PM 
 
Ms. Lisa Salmon [address] [place] [address] Drive [facility] [address] To Whom It May Concern: 
 
Lisa Salmon is currently under the care of [address]. She will return to work/school on: 05/05/2018 If there are questions or concerns please have the patient contact our office. Sincerely, Sally Brunner, MD

## 2018-05-04 NOTE — MR AVS SNAPSHOT
[address] [facility] [address] 
[phone] Patient: Tea Lemons MRN: [id number] HIA:[id number]/92/[id number] Visit Information Date & Time Provider Department Dept. Phone Encounter #  
 5/4/2018  2:45 PM Emelina Jorgensen MD [facility] [phone] [id number] Follow-up Instructions Return in about 3 months (around 8/4/2018). Upcoming Health Maintenance Date Due  
 FOOT EXAM Q1 7/24/1995 EYE EXAM RETINAL OR DILATED Q1 7/24/1995 Pneumococcal 19-64 Medium Risk (1 of 1 - PPSV23) 7/24/2004 DTaP/Tdap/Td series (1 - Tdap) 7/24/2006 PAP AKA CERVICAL CYTOLOGY 7/24/2006 Influenza Age 5 to Adult 8/1/2018 HEMOGLOBIN A1C Q6M 8/20/2018 LIPID PANEL Q1 2/20/2019 MICROALBUMIN Q1 3/5/2019 Allergies as of 5/4/2018  Review Complete On: 5/4/2018 By: Emelina Jorgensen MD  
  
 Severity Noted Reaction Type Reactions Lisinopril  03/22/2017    Swelling Current Immunizations  Reviewed on 11/6/2017 No immunizations on file. Not reviewed this visit You Were Diagnosed With   
  
 Codes Comments Uncontrolled type 2 diabetes mellitus with hyperglycemia, with long-term current use of insulin (HCC)    -  Primary ICD-10-CM: E11.65, Z79.4 ICD-9-CM: 250.02, V58.67 Vitals BP Pulse Temp Resp Height(growth percentile) Weight(growth percentile)  
 119/64 (BP 1 Location: Left arm, BP Patient Position: Sitting) 97 98.6 °F (37 °C) (Oral) 18 5' 7\" (1.702 m) 169 lb 4.8 oz (76.8 kg) LMP SpO2 BMI OB Status Smoking Status 04/26/2018 98% 26.52 kg/m2 Having regular periods Never Smoker Vitals History BMI and BSA Data Body Mass Index Body Surface Area  
 26.52 kg/m 2 1.91 m 2 Preferred Pharmacy Pharmacy Name Phone [facility] [address] [place] [address] [phone] Your Updated Medication List  
  
 This list is accurate as of 5/4/18  3:38 PM.  Always use your most recent med list.  
  
  
  
  
 aspirin delayed-release 81 mg tablet Take  by mouth daily. empagliflozin 25 mg tablet Commonly known as:  Teo Sill Take 1 Tab by mouth Daily (before breakfast). losartan 50 mg tablet Commonly known as:  COZAAR Take 1 Tab by mouth daily. Stop methyldopa  
  
 norethindrone-ethinyl estradiol 1-20 mg-mcg Tab Commonly known as:  MICROGESTIN 1/20 Take  by mouth. ONETOUCH ULTRA TEST strip Generic drug:  glucose blood VI test strips USE TO TEST BLOOD SUGAR LEVELS 4 TO 6 TIMES PER DAY  
  
 prenatal multivit-ca-min-fe-fa Tab Take  by mouth. SITagliptin-metFORMIN 50-1,000 mg Tm24 Commonly known as:  JANUMET XR Take 1 Tab by mouth two (2) times daily (with meals). We Performed the Following AMB POC HEMOGLOBIN A1C [20877 CPT(R)] Follow-up Instructions Return in about 3 months (around 8/4/2018). Patient Instructions   
 
 
 
 janumet  xr  50/1000 mg  One pill twice a day with meals Stopped glipizide Stop  bydureon b-cise 2 mg  once a week Stay on  jardiance 25 mg a day before b-fast  ( drink plenty of water ) Diflucan 150 mg one pill a day  (  2 refills ) Stay on  Cozaar 50 mg a day  
 
-------------------------------------------------------------------------------- Check blood sugars only twice a day by rotation as follows First day - before b-fast and - before lunch Second day- before dinner and  before bed time After 2 days go back to same rotation 
 
 
 
-------------------------------------------------------------------------------------------------------------------------------------------------------------------------- Do not skip meals Do not eat in between meals Reduce carbs- pasta, rice, potatoes, bread Do not drink juices or sodas Donot eat peanut butter Do not eat sugar free cookies and cakes Do not eat peaches, grapes, watermelon, oranges, pineapples,  And raisins Introducing [facility] HE[facility] SERVICES! [employer] introduces [employer] patient portal. Now you can access parts of your medical record, email your doctor's office, and request medication refills online. 1. In your internet browser, go to [email or web address] 2. Click on the First Time User? Click Here link in the Sign In box. You will see the New Member Sign Up page. 3. Enter your [employer] Access Code exactly as it appears below. You will not need to use this code after youve completed the sign-up process. If you do not sign up before the expiration date, you must request a new code. · [employer] Access Code: [email or web address] Expires: 5/31/2018 12:48 PM 
 
4. Enter the last four digits of your Social Security Number (xxxx) and Date of Birth (mm/dd/yyyy) as indicated and click Submit. You will be taken to the next sign-up page. 5. Create a [employer] ID. This will be your [employer] login ID and cannot be changed, so think of one that is secure and easy to remember. 6. Create a [employer] password. You can change your password at any time. 7. Enter your Password Reset Question and Answer. This can be used at a later time if you forget your password. 8. Enter your e-mail address. You will receive e-mail notification when new information is available in [address] 9. Click Sign Up. You can now view and download portions of your medical record. 10. Click the Download Summary menu link to download a portable copy of your medical information. If you have questions, please visit the Frequently Asked Questions section of the [employer] website. Remember, [employer] is NOT to be used for urgent needs. For medical emergencies, dial 911. Now available from your iPhone and Android! Please provide this summary of care documentation to your next provider. Your primary care clinician is listed as Casey Arteaga. If you have any questions after today's visit, please call [phone].

## 2018-08-15 RX ORDER — LOSARTAN POTASSIUM 50 MG/1
TABLET ORAL
Qty: 90 TAB | Refills: 6 | Status: SHIPPED | OUTPATIENT
Start: 2018-08-15 | End: 2019-07-11

## 2018-08-31 ENCOUNTER — OFFICE VISIT (OUTPATIENT)
Dept: ENDOCRINOLOGY | Age: 33
End: 2018-08-31

## 2018-08-31 VITALS
WEIGHT: 171.5 LBS | RESPIRATION RATE: 18 BRPM | BODY MASS INDEX: 26.92 KG/M2 | OXYGEN SATURATION: 97 % | SYSTOLIC BLOOD PRESSURE: 115 MMHG | TEMPERATURE: 98 F | HEART RATE: 88 BPM | DIASTOLIC BLOOD PRESSURE: 71 MMHG | HEIGHT: 67 IN

## 2018-08-31 DIAGNOSIS — E11.65 UNCONTROLLED TYPE 2 DIABETES MELLITUS WITH HYPERGLYCEMIA, WITH LONG-TERM CURRENT USE OF INSULIN (HCC): Primary | ICD-10-CM

## 2018-08-31 DIAGNOSIS — Z79.4 UNCONTROLLED TYPE 2 DIABETES MELLITUS WITH HYPERGLYCEMIA, WITH LONG-TERM CURRENT USE OF INSULIN (HCC): Primary | ICD-10-CM

## 2018-08-31 DIAGNOSIS — E78.2 MIXED HYPERLIPIDEMIA: ICD-10-CM

## 2018-08-31 DIAGNOSIS — I10 ESSENTIAL HYPERTENSION: ICD-10-CM

## 2018-08-31 LAB
GLUCOSE POC: 127 MG/DL
HBA1C MFR BLD HPLC: 6.6 %

## 2018-08-31 NOTE — MR AVS SNAPSHOT
[address] [facility] [address] 
[phone] Patient: Félix Saini MRN: [id number] YIR:6/92/[id number] Visit Information Date & Time Provider Department Dept. Phone Encounter #  
 8/31/2018  9:15 AM Carly Anderson MD [facility] [phone] [id number] Follow-up Instructions Return in about 6 months (around 2/28/2019). Upcoming Health Maintenance Date Due  
 FOOT EXAM Q1 7/24/1995 EYE EXAM RETINAL OR DILATED Q1 7/24/1995 Pneumococcal 19-64 Medium Risk (1 of 1 - PPSV23) 7/24/2004 DTaP/Tdap/Td series (1 - Tdap) 7/24/2006 PAP AKA CERVICAL CYTOLOGY 7/24/2006 Influenza Age 5 to Adult 8/1/2018 HEMOGLOBIN A1C Q6M 11/4/2018 LIPID PANEL Q1 2/20/2019 MICROALBUMIN Q1 3/5/2019 Allergies as of 8/31/2018  Review Complete On: 8/31/2018 By: Carly Anderson MD  
  
 Severity Noted Reaction Type Reactions Lisinopril  03/22/2017    Swelling Current Immunizations  Reviewed on 11/6/2017 No immunizations on file. Not reviewed this visit You Were Diagnosed With   
  
 Codes Comments Uncontrolled type 2 diabetes mellitus with hyperglycemia, with long-term current use of insulin (HCC)    -  Primary ICD-10-CM: E11.65, Z79.4 ICD-9-CM: 250.02, V58.67 Essential hypertension     ICD-10-CM: I10 
ICD-9-CM: 401.9 Mixed hyperlipidemia     ICD-10-CM: E78.2 ICD-9-CM: 272.2 Vitals BP Pulse Temp Resp Height(growth percentile) Weight(growth percentile) 115/71 (BP 1 Location: Left arm, BP Patient Position: Sitting) 88 98 °F (36.7 °C) (Oral) 18 5' 7\" (1.702 m) 171 lb 8 oz (77.8 kg) LMP SpO2 Breastfeeding? BMI OB Status Smoking Status 08/16/2018 97% No 26.86 kg/m2 Having regular periods Never Smoker BMI and BSA Data Body Mass Index Body Surface Area  
 26.86 kg/m 2 1.92 m 2 Preferred Pharmacy Pharmacy Name Phone [facility] [address] [facility] [phone] Your Updated Medication List  
  
   
This list is accurate as of 8/31/18 10:10 AM.  Always use your most recent med list.  
  
  
  
  
 aspirin delayed-release 81 mg tablet Take  by mouth daily. empagliflozin 25 mg tablet Commonly known as:  Tammy Rojas Take 1 Tab by mouth Daily (before breakfast). JANUMET XR 50-1,000 mg Tm24 Generic drug:  SITagliptin-metFORMIN  
TAKE 1 TABLET BY MOUTH TWICE DAILY WITH MEALS  
  
 losartan 50 mg tablet Commonly known as:  COZAAR  
TAKE 1 TABLET BY MOUTH DAILY. STOP METHYLDOPA  
  
 norethindrone-ethinyl estradiol 1-20 mg-mcg Tab Commonly known as:  MICROGESTIN 1/20 Take  by mouth. ONETOUCH ULTRA TEST strip Generic drug:  glucose blood VI test strips USE TO TEST BLOOD SUGAR LEVELS 4 TO 6 TIMES PER DAY  
  
 prenatal multivit-ca-min-fe-fa Tab Take  by mouth. We Performed the Following AMB POC GLUCOSE BLOOD, BY GLUCOSE MONITORING DEVICE [90239 CPT(R)] AMB POC HEMOGLOBIN A1C [15509 CPT(R)] Follow-up Instructions Return in about 6 months (around 2/28/2019). Patient Instructions   
-------------------------------------------------------------------------------------------- Refills    -    please call your pharmacy and have them send us a refill request 
 
Results  -  allow up to a week for lab results to be processed and reviewed. Phone calls  -  Allow upto 24 hrs. for non-urgent calls to be retained Prior authorization - It may take up to 2 weeks to process, depending on your insurance Forms  -  FMLA, DMV, patient assistance, etc. will take up to 2 weeks to process Cancellations - please notify the office in advance if you cannot keep your appointment Samples  - will only be dispensed at visits as supply is limited If you are having a medical emergency call [phone] -------------------------------------------------------------------------------------------- 
 
 
 
 janumet  xr  50/1000 mg  One pill twice a day with meals Stay on  jardiance 25 mg a day before b-fast  ( drink plenty of water ) Diflucan 150 mg one pill a day  (  2 refills ) Stay on  Cozaar 50 mg a day  
 
-------------------------------------------------------------------------------- Check blood sugars only twice a day by rotation as follows First day - before b-fast and - before lunch Second day- before dinner and  before bed time After 2 days go back to same rotation 
 
 
 
-------------------------------------------------------------------------------------------------------------------------------------------------------------------------- Do not skip meals Do not eat in between meals Reduce carbs- pasta, rice, potatoes, bread Do not drink juices or sodas Donot eat peanut butter Do not eat sugar free cookies and cakes Do not eat peaches, grapes, watermelon, oranges, pineapples,  And raisins Introducing [facility] & HE[facility] SERVICES! [employer] introduces [employer] patient portal. Now you can access parts of your medical record, email your doctor's office, and request medication refills online. 1. In your internet browser, go to [email or web address] 2. Click on the First Time User? Click Here link in the Sign In box. You will see the New Member Sign Up page. 3. Enter your [employer] Access Code exactly as it appears below. You will not need to use this code after youve completed the sign-up process. If you do not sign up before the expiration date, you must request a new code. · [employer] Access Code: [email or web address] Expires: 11/29/2018 10:10 AM 
 
4. Enter the last four digits of your Social Security Number (xxxx) and Date of Birth (mm/dd/yyyy) as indicated and click Submit. You will be taken to the next sign-up page. 5. Create a [employer] ID. This will be your [employer] login ID and cannot be changed, so think of one that is secure and easy to remember. 6. Create a [employer] password. You can change your password at any time. 7. Enter your Password Reset Question and Answer. This can be used at a later time if you forget your password. 8. Enter your e-mail address. You will receive e-mail notification when new information is available in [address] 9. Click Sign Up. You can now view and download portions of your medical record. 10. Click the Download Summary menu link to download a portable copy of your medical information. If you have questions, please visit the Frequently Asked Questions section of the [employer] website. Remember, [employer] is NOT to be used for urgent needs. For medical emergencies, dial 911. Now available from your iPhone and Android! Please provide this summary of care documentation to your next provider. If you have any questions after today's visit, please call [phone].

## 2018-08-31 NOTE — PROGRESS NOTES
HISTORY OF PRESENT ILLNESS Marylee Pinks is a 35 y.o. female. HPI Patient is here for f/u   DM type 2  From may  2018 Gained 2 lbs She got the meter, sugars are good C/o yeast infections, rare though Old history : 
 
She could not tolerate bydureon ( lost more weight ) She stopped She had jardiance  Taken and is tolerating It She had yeast infection Old hsitory : 
She delivered the baby boy on 2017 Lost 14  lbs She is off  insulins She is not breast feeding any more She is diet compliant She is on janumet and glipizide Old history : 
 
She  is referred by Dr. Javon Gleason H/o DM 2  For 12 years Usually controlled well until she got pregnant  She says  
 
 
pcp ( Dr. Ramón Flores ) stopped  Medications - janumet xr she said after pregnancy was found out Since, pt has noted severely high sugars and is very concerned  one    Para 0   0 and she is through 6 weeks of second gestation. LMP : 2017 ELISEO : 2017 Current monitoring regimen: home blood tests - 3 times daily Home blood sugar records: trend: fluctuating a lot Any episodes of hypoglycemia? no 
 
 
Current diabetic medications include got onto toujeo and stopped janmet xr recently . Eye exam current (within one year): yes Weight trend: fluctuating a bit Prior visit with dietician: no 
Current diet: \"unhealthy\" diet in general 
Current exercise: no regular exercise Mother is diabetic, on insulin , type not sure Past Medical History:  
Diagnosis Date  Abnormal Papanicolaou smear of cervix  Asthma   
 uses inhaler as needed  Essential hypertension  Gestational diabetes Social History Social History  Marital status: SINGLE Spouse name: N/A  
 Number of children: N/A  
 Years of education: N/A Occupational History  Not on file. Social History Main Topics  Smoking status: Never Smoker  Smokeless tobacco: Never Used  Alcohol use No  
 Drug use: No  
 Sexual activity: Yes  
  Partners: Male Birth control/ protection: None Other Topics Concern  Not on file Social History Narrative Family History Problem Relation Age of Onset  Diabetes Mother  Diabetes Father  MS Sister Review of Systems Constitutional: Negative. HENT: Negative. Eyes: Negative for pain and redness. Respiratory: Negative. Cardiovascular: Negative for chest pain, palpitations and leg swelling. Gastrointestinal: Negative. Negative for constipation. Genitourinary: Negative. Musculoskeletal: Negative for myalgias. Skin: Negative. Neurological: Negative. Endo/Heme/Allergies: Negative. Psychiatric/Behavioral: Negative for depression and memory loss. The patient does not have insomnia. Physical Exam  
Constitutional: She is oriented to person, place, and time. She appears well-developed and well-nourished. HENT:  
Head: Normocephalic. Eyes: Conjunctivae and EOM are normal. Pupils are equal, round, and reactive to light. Neck: Normal range of motion. Neck supple. No JVD present. No tracheal deviation present. No thyromegaly present. Cardiovascular: Normal rate, regular rhythm and normal heart sounds. No murmur heard. Pulmonary/Chest: Breath sounds normal.  
Abdominal: Soft. Bowel sounds are normal. 
Musculoskeletal: Normal range of motion. Lymphadenopathy:  
  She has no cervical adenopathy. Neurological: She is alert and oriented to person, place, and time. She has normal reflexes. Skin: Skin is warm. Psychiatric: She has a normal mood and affect. Diabetic foot exam: march 2018 Left: Reflexes nd Vibratory sensation normal  
 Proprioception nd Sharp/dull discrimination nd Filament test normal sensation with micro filament Pulse DP: 2+ (normal) Pulse PT: nd 
 Deformities: None Right: Reflexes nd Vibratory sensation normal 
 Proprioception nd Sharp/dull discrimination nd Filament test normal sensation with micro filament Pulse DP: 2+ (normal) Pulse PT: nd 
 Deformities: None Lab Results Component Value Date/Time Hemoglobin A1c 6.5 (H) 02/20/2018 08:25 AM  
 Hemoglobin A1c 6.3 (H) 10/05/2017 03:09 PM  
 Hemoglobin A1c 6.5 (H) 06/01/2017 03:04 PM  
 Glucose 83 02/20/2018 08:25 AM  
 Glucose (POC) 116 (H) 11/07/2017 06:00 AM  
 Glucose  08/31/2018 09:37 AM  
 Microalb/Creat ratio (ug/mg creat.) 14.8 03/05/2018 04:22 PM  
 LDL, calculated 59 02/20/2018 08:25 AM  
 Creatinine 0.62 02/20/2018 08:25 AM  
  
Lab Results Component Value Date/Time TSH 0.717 10/05/2017 03:09 PM  
  
 
 
ASSESSMENT and PLAN Type 2 diabetes uncontrolled : a1c is    6.6 %      From    Aug 2018    Compared to  6.5 %    From feb 2018   comapred to   6.3  %       From     Oct 2017     compared to   5.8 %      From     Sept 2017     comapred to   5.7 %     From     July 2017    Compared to    6.5 %         From      June 2017      Compared to    6.4 %      From     April 28 2017 by finger stick    Compared  to  7.9 %   From finger stick on march 8 2017 Log is good On janumet xr and  jardianceShe is off  glipizide per my advise She stopped bydureon Discussed med side effects  Again today She is educated about possibility of worsening of retinopathy with aggressive glycemic control. 2. htn : losartan 50 mg a day , BP well controlled 3. Hyperlipidemia : lipids are normal  
 
  
> 50 % of time is spent on counseling Patient voiced understanding her plan of care

## 2018-08-31 NOTE — PROGRESS NOTES
1. Have you been to the ER, urgent care clinic since your last visit? No  Hospitalized since your last visit? No 
 
2. Have you seen or consulted any other health care providers outside of the [address] [place] [address] since your last visit? No 
 
Wt Readings from Last 3 Encounters:  
08/31/18 171 lb 8 oz (77.8 kg) 05/04/18 169 lb 4.8 oz (76.8 kg) 03/02/18 174 lb (78.9 kg) Temp Readings from Last 3 Encounters:  
08/31/18 98 °F (36.7 °C) (Oral) 05/04/18 98.6 °F (37 °C) (Oral) 03/02/18 97.5 °F (36.4 °C) (Oral) BP Readings from Last 3 Encounters:  
08/31/18 115/71  
05/04/18 119/64  
03/02/18 124/76 Pulse Readings from Last 3 Encounters:  
08/31/18 88  
05/04/18 97  
03/02/18 80 Lab Results Component Value Date/Time Hemoglobin A1c 6.5 (H) 02/20/2018 08:25 AM  
 Hemoglobin A1c (POC) 6.4 05/04/2018 03:33 PM  
 
Patient has meter today. Patient needs eye exam referral. 
Patient concerned about menstrual cycle and concerned about foot pain.

## 2018-08-31 NOTE — PATIENT INSTRUCTIONS
-------------------------------------------------------------------------------------------- Refills    -    please call your pharmacy and have them send us a refill request 
 
Results  -  allow up to a week for lab results to be processed and reviewed. Phone calls  -  Allow upto 24 hrs. for non-urgent calls to be retained Prior authorization - It may take up to 2 weeks to process, depending on your insurance Forms  -  FMLA, DMV, patient assistance, etc. will take up to 2 weeks to process Cancellations - please notify the office in advance if you cannot keep your appointment Samples  - will only be dispensed at visits as supply is limited If you are having a medical emergency call 911 
 
-------------------------------------------------------------------------------------------- 
 
 
 
 janumet  xr  50/1000 mg  One pill twice a day with meals Stay on  jardiance 25 mg a day before b-fast  ( drink plenty of water ) Diflucan 150 mg one pill a day  (  2 refills ) Stay on  Cozaar 50 mg a day  
 
-------------------------------------------------------------------------------- Check blood sugars only twice a day by rotation as follows First day - before b-fast and - before lunch Second day- before dinner and  before bed time After 2 days go back to same rotation 
 
 
 
-------------------------------------------------------------------------------------------------------------------------------------------------------------------------- Do not skip meals Do not eat in between meals Reduce carbs- pasta, rice, potatoes, bread Do not drink juices or sodas Donot eat peanut butter Do not eat sugar free cookies and cakes Do not eat peaches, grapes, watermelon, oranges, pineapples,  And raisins

## 2018-09-01 LAB
ALBUMIN SERPL-MCNC: 4.2 G/DL (ref 3.5–5.5)
ALBUMIN/CREAT UR: 4.9 MG/G CREAT (ref 0–30)
ALBUMIN/GLOB SERPL: 1.1 {RATIO} (ref 1.2–2.2)
ALP SERPL-CCNC: 36 IU/L (ref 39–117)
ALT SERPL-CCNC: 9 IU/L (ref 0–32)
AST SERPL-CCNC: 12 IU/L (ref 0–40)
BILIRUB SERPL-MCNC: 0.3 MG/DL (ref 0–1.2)
BUN SERPL-MCNC: 13 MG/DL (ref 6–20)
BUN/CREAT SERPL: 20 (ref 9–23)
CALCIUM SERPL-MCNC: 9.4 MG/DL (ref 8.7–10.2)
CHLORIDE SERPL-SCNC: 101 MMOL/L (ref 96–106)
CHOLEST SERPL-MCNC: 147 MG/DL (ref 100–199)
CO2 SERPL-SCNC: 22 MMOL/L (ref 20–29)
CREAT SERPL-MCNC: 0.66 MG/DL (ref 0.57–1)
CREAT UR-MCNC: 91.8 MG/DL
GLOBULIN SER CALC-MCNC: 3.7 G/DL (ref 1.5–4.5)
GLUCOSE SERPL-MCNC: 115 MG/DL (ref 65–99)
HDLC SERPL-MCNC: 50 MG/DL
INTERPRETATION, 910389: NORMAL
LDLC SERPL CALC-MCNC: 74 MG/DL (ref 0–99)
Lab: NORMAL
MICROALBUMIN UR-MCNC: 4.5 UG/ML
POTASSIUM SERPL-SCNC: 4.6 MMOL/L (ref 3.5–5.2)
PROT SERPL-MCNC: 7.9 G/DL (ref 6–8.5)
SODIUM SERPL-SCNC: 137 MMOL/L (ref 134–144)
TRIGL SERPL-MCNC: 114 MG/DL (ref 0–149)
VLDLC SERPL CALC-MCNC: 23 MG/DL (ref 5–40)

## 2019-02-16 LAB
ALBUMIN SERPL-MCNC: 4.4 G/DL (ref 3.5–5.5)
ALBUMIN/CREAT UR: <3.7 MG/G CREAT (ref 0–30)
ALBUMIN/GLOB SERPL: 1.3 {RATIO} (ref 1.2–2.2)
ALP SERPL-CCNC: 48 IU/L (ref 39–117)
ALT SERPL-CCNC: 10 IU/L (ref 0–32)
AST SERPL-CCNC: 12 IU/L (ref 0–40)
BILIRUB SERPL-MCNC: <0.2 MG/DL (ref 0–1.2)
BUN SERPL-MCNC: 11 MG/DL (ref 6–20)
BUN/CREAT SERPL: 16 (ref 9–23)
CALCIUM SERPL-MCNC: 9.4 MG/DL (ref 8.7–10.2)
CHLORIDE SERPL-SCNC: 100 MMOL/L (ref 96–106)
CHOLEST SERPL-MCNC: 136 MG/DL (ref 100–199)
CO2 SERPL-SCNC: 21 MMOL/L (ref 20–29)
CREAT SERPL-MCNC: 0.7 MG/DL (ref 0.57–1)
CREAT UR-MCNC: 82.1 MG/DL
EST. AVERAGE GLUCOSE BLD GHB EST-MCNC: 171 MG/DL
GLOBULIN SER CALC-MCNC: 3.5 G/DL (ref 1.5–4.5)
GLUCOSE SERPL-MCNC: 109 MG/DL (ref 65–99)
HBA1C MFR BLD: 7.6 % (ref 4.8–5.6)
HDLC SERPL-MCNC: 45 MG/DL
INTERPRETATION, 910389: NORMAL
LDLC SERPL CALC-MCNC: 56 MG/DL (ref 0–99)
Lab: NORMAL
MICROALBUMIN UR-MCNC: <3 UG/ML
POTASSIUM SERPL-SCNC: 4.2 MMOL/L (ref 3.5–5.2)
PROT SERPL-MCNC: 7.9 G/DL (ref 6–8.5)
SODIUM SERPL-SCNC: 139 MMOL/L (ref 134–144)
TRIGL SERPL-MCNC: 174 MG/DL (ref 0–149)
VLDLC SERPL CALC-MCNC: 35 MG/DL (ref 5–40)

## 2019-03-07 ENCOUNTER — OFFICE VISIT (OUTPATIENT)
Dept: ENDOCRINOLOGY | Age: 34
End: 2019-03-07

## 2019-03-07 VITALS
RESPIRATION RATE: 14 BRPM | SYSTOLIC BLOOD PRESSURE: 113 MMHG | BODY MASS INDEX: 27.94 KG/M2 | HEIGHT: 67 IN | HEART RATE: 86 BPM | DIASTOLIC BLOOD PRESSURE: 56 MMHG | WEIGHT: 178 LBS | TEMPERATURE: 98.5 F

## 2019-03-07 DIAGNOSIS — E78.2 MIXED HYPERLIPIDEMIA: ICD-10-CM

## 2019-03-07 DIAGNOSIS — E11.65 UNCONTROLLED TYPE 2 DIABETES MELLITUS WITH HYPERGLYCEMIA, WITHOUT LONG-TERM CURRENT USE OF INSULIN (HCC): Primary | ICD-10-CM

## 2019-03-07 DIAGNOSIS — I10 ESSENTIAL HYPERTENSION: ICD-10-CM

## 2019-03-07 DIAGNOSIS — M79.671 RIGHT FOOT PAIN: ICD-10-CM

## 2019-03-07 RX ORDER — SERTRALINE HYDROCHLORIDE 50 MG/1
75 TABLET, FILM COATED ORAL DAILY
Refills: 1 | COMMUNITY
Start: 2019-01-21

## 2019-03-07 RX ORDER — NORETHINDRONE ACETATE AND ETHINYL ESTRADIOL AND FERROUS FUMARATE 1MG-20(24)
KIT ORAL
Refills: 7 | COMMUNITY
Start: 2019-02-26

## 2019-03-07 NOTE — PROGRESS NOTES
Wt Readings from Last 3 Encounters:   03/07/19 178 lb (80.7 kg)   08/31/18 171 lb 8 oz (77.8 kg)   05/04/18 169 lb 4.8 oz (76.8 kg)     Temp Readings from Last 3 Encounters:   03/07/19 98.5 °F (36.9 °C) (Oral)   08/31/18 98 °F (36.7 °C) (Oral)   05/04/18 98.6 °F (37 °C) (Oral)     BP Readings from Last 3 Encounters:   03/07/19 113/56   08/31/18 115/71   05/04/18 119/64     Pulse Readings from Last 3 Encounters:   03/07/19 86   08/31/18 88   05/04/18 97     Lab Results   Component Value Date/Time    Hemoglobin A1c 7.6 (H) 02/15/2019 03:59 PM    Hemoglobin A1c (POC) 6.6 08/31/2018 09:37 AM

## 2019-03-07 NOTE — PROGRESS NOTES
HISTORY OF PRESENT ILLNESS  Tea Lemons is a 35 y.o. female. HPI  Patient is here for f/u   DM type 2  From 2018     Gained 2 lbs     She got the meter,  But not checking though  C/o yeast infections, Many         Old history :    She could not tolerate bydureon ( lost more weight )  She stopped     She had jardiance  Taken and is tolerating It   She had yeast infection      Old hsitory :  She delivered the baby boy on 2017     Lost 14  lbs   She is off  insulins     She is not breast feeding any more   She is diet compliant     She is on janumet and glipizide         Old history :    She  is referred by Dr. Prashant Covarrubias    H/o DM 2  For 12 years   Usually controlled well until she got pregnant  She says       pcp ( Dr. Eder Nicholas ) stopped  Medications - janumet xr she said after pregnancy was found out   Since, pt has noted severely high sugars and is very concerned       [place] one    Para 0   0 and she is through 6 weeks of second gestation. LMP : 2017   ELISEO : 2017     Current monitoring regimen: home blood tests - 3 times daily  Home blood sugar records: trend: fluctuating a lot  Any episodes of hypoglycemia? no      Current diabetic medications include got onto toujeo and stopped janmet xr recently .      Eye exam current (within one year): yes  Weight trend: fluctuating a bit  Prior visit with dietician: no  Current diet: \"unhealthy\" diet in general  Current exercise: no regular exercise      Mother is diabetic, on insulin , type not sure         Past Medical History:   Diagnosis Date    Abnormal Papanicolaou smear of cervix     Asthma     uses inhaler as needed    Essential hypertension     Gestational diabetes        Social History     Socioeconomic History    Marital status: SINGLE     Spouse name: Not on file    Number of children: Not on file    Years of education: Not on file    Highest education level: Not on file   Social Needs    Financial resource strain: Not on file    Food insecurity - worry: Not on file    Food insecurity - inability: Not on file    Transportation needs - medical: Not on file   [employer] needs - non-medical: Not on file   Occupational History    Not on file   Tobacco Use    Smoking status: Never Smoker    Smokeless tobacco: Never Used   Substance and Sexual Activity    Alcohol use: No    Drug use: No    Sexual activity: Yes     Partners: Male     Birth control/protection: None   Other Topics Concern    Not on file   Social History Narrative    Not on file       Family History   Problem Relation Age of Onset    Diabetes Mother     Diabetes Father     MS Sister              Review of Systems   Constitutional: Negative. HENT: Negative. Eyes: Negative for pain and redness. Respiratory: Negative. Cardiovascular: Negative for chest pain, palpitations and leg swelling. Gastrointestinal: Negative. Negative for constipation. Genitourinary: Negative. Musculoskeletal: Negative for myalgias. Skin: Negative. Neurological: Negative. Endo/Heme/Allergies: Negative. Psychiatric/Behavioral: Negative for depression and memory loss. The patient does not have insomnia. Physical Exam   Constitutional: She is oriented to person, place, and time. She appears well-developed and well-nourished. HENT:   Head: Normocephalic. Eyes: Conjunctivae and EOM are normal. Pupils are equal, round, and reactive to light. Neck: Normal range of motion. Neck supple. No JVD present. No tracheal deviation present. No thyromegaly present. Cardiovascular: Normal rate, regular rhythm and normal heart sounds. No murmur heard. Pulmonary/Chest: Breath sounds normal.   Abdominal: Soft. Bowel sounds are normal.  Musculoskeletal: Normal range of motion. Lymphadenopathy:     She has no cervical adenopathy. Neurological: She is alert and oriented to person, place, and time. She has normal reflexes. Skin: Skin is warm. Psychiatric: She has a normal mood and affect. Diabetic foot exam: march 2018    Left: Reflexes nd     Vibratory sensation normal    Proprioception nd   Sharp/dull discrimination nd    Filament test normal sensation with micro filament   Pulse DP: 2+ (normal)   Pulse PT: nd   Deformities: None  Right: Reflexes nd   Vibratory sensation normal   Proprioception nd   Sharp/dull discrimination nd   Filament test normal sensation with micro filament   Pulse DP: 2+ (normal)   Pulse PT: nd   Deformities: None      Lab Results   Component Value Date/Time    Hemoglobin A1c 7.6 (H) 02/15/2019 03:59 PM    Hemoglobin A1c 6.5 (H) 02/20/2018 08:25 AM    Hemoglobin A1c 6.3 (H) 10/05/2017 03:09 PM    Glucose 109 (H) 02/15/2019 03:59 PM    Glucose (POC) 116 (H) 11/07/2017 06:00 AM    Glucose  08/31/2018 09:37 AM    Microalb/Creat ratio (ug/mg creat.) <3.7 02/15/2019 03:59 PM    LDL, calculated 56 02/15/2019 03:59 PM    Creatinine 0.70 02/15/2019 03:59 PM      Lab Results   Component Value Date/Time    TSH 0.717 10/05/2017 03:09 PM          ASSESSMENT and PLAN    Type 2 diabetes uncontrolled : a1c is  7.6 %    From   March 2019     Compared to    6.6 %      From    Aug 2018    Compared to  6.5 %    From feb 2018   comapred to   6.3  %       From     Oct 2017     compared to   5.8 %      From     Sept 2017     comapred to   5.7 %     From     July 2017    Compared to    6.5 %         From      June 2017      Compared to    6.4 %      From     April 28 2017 by finger stick    Compared  to  7.9 %   From finger stick on march 8 2017     Lost  Glycemic control AS SHE HAS NOT BEEN CHECKING SUGARS  SHE is promising to do better   On janumet xr and  jardiance  She is off  glipizide per my advise   She stopped bydureon   Discussed med side effects  Again today   She is educated about possibility of worsening of retinopathy with aggressive glycemic control. 2. htn : losartan 50 mg a day , BP well controlled       3. Hyperlipidemia : lipids are normal       C/o plantar fasciitis pain       > 50 % of time is spent on counseling   Patient voiced understanding her plan of care

## 2019-03-17 RX ORDER — FLUCONAZOLE 150 MG/1
150 TABLET ORAL DAILY
Qty: 1 TAB | Refills: 4 | Status: SHIPPED | OUTPATIENT
Start: 2019-03-17 | End: 2019-03-18

## 2019-04-19 ENCOUNTER — DOCUMENTATION ONLY (OUTPATIENT)
Dept: ENDOCRINOLOGY | Age: 34
End: 2019-04-19

## 2019-04-19 RX ORDER — GLIPIZIDE 5 MG/1
5 TABLET ORAL 2 TIMES DAILY
Qty: 60 TAB | Refills: 6 | Status: SHIPPED | OUTPATIENT
Start: 2019-04-19 | End: 2019-04-19 | Stop reason: SDUPTHER

## 2019-04-19 NOTE — PROGRESS NOTES
Patient has requested to go off jardiance because of yeast infections and I agreed      Please send in glipizide 5 mg twice a day, twenty min before b-fast and dinner       Plus       trulicity 1.62 mg once a week   ( she needs voucher and card )      --------------------------------------------     I am getting a sense that pancrease may be getting weaker     She needs to keep track of her log     ----------------------------------------------------------

## 2019-04-20 RX ORDER — GLIPIZIDE 5 MG/1
TABLET ORAL
Qty: 180 TAB | Refills: 6 | Status: SHIPPED | OUTPATIENT
Start: 2019-04-20 | End: 2019-07-11 | Stop reason: SDUPTHER

## 2019-04-22 NOTE — PROGRESS NOTES
Glipizide previously sent to pharmacy. Trulicity will be sent pending MD approval. Attempt to contact patient about medication changes and picking up savings card/voucher. Patient's voicemail is full. Unable to leave message.

## 2019-04-25 RX ORDER — METHYLDOPA 250 MG/1
TABLET, FILM COATED ORAL
Qty: 60 TAB | Refills: 6 | Status: SHIPPED | OUTPATIENT
Start: 2019-04-25 | End: 2019-06-07 | Stop reason: SDUPTHER

## 2019-04-25 NOTE — TELEPHONE ENCOUNTER
Patient states she has an allergic reaction to Losartan. Patient states MD discussed going back on Methyldopa 250mg. Patient states she is going to [facility] to receive medical attention.

## 2019-04-26 ENCOUNTER — TELEPHONE (OUTPATIENT)
Dept: ENDOCRINOLOGY | Age: 34
End: 2019-04-26

## 2019-07-02 RX ORDER — SITAGLIPTIN AND METFORMIN HYDROCHLORIDE 50; 1000 MG/1; MG/1
TABLET, FILM COATED, EXTENDED RELEASE ORAL
Qty: 60 TAB | Refills: 0 | Status: SHIPPED | OUTPATIENT
Start: 2019-07-02 | End: 2019-07-11 | Stop reason: SDUPTHER

## 2019-07-03 DIAGNOSIS — E11.65 UNCONTROLLED TYPE 2 DIABETES MELLITUS WITH HYPERGLYCEMIA, WITH LONG-TERM CURRENT USE OF INSULIN (HCC): ICD-10-CM

## 2019-07-03 DIAGNOSIS — Z79.4 UNCONTROLLED TYPE 2 DIABETES MELLITUS WITH HYPERGLYCEMIA, WITH LONG-TERM CURRENT USE OF INSULIN (HCC): ICD-10-CM

## 2019-07-04 LAB
ALBUMIN SERPL-MCNC: 3.9 G/DL (ref 3.5–5.5)
ALBUMIN/GLOB SERPL: 1.2 {RATIO} (ref 1.2–2.2)
ALP SERPL-CCNC: 40 IU/L (ref 39–117)
ALT SERPL-CCNC: 12 IU/L (ref 0–32)
AST SERPL-CCNC: 13 IU/L (ref 0–40)
BILIRUB SERPL-MCNC: <0.2 MG/DL (ref 0–1.2)
BUN SERPL-MCNC: 10 MG/DL (ref 6–20)
BUN/CREAT SERPL: 18 (ref 9–23)
CALCIUM SERPL-MCNC: 9.3 MG/DL (ref 8.7–10.2)
CHLORIDE SERPL-SCNC: 100 MMOL/L (ref 96–106)
CHOLEST SERPL-MCNC: 120 MG/DL (ref 100–199)
CO2 SERPL-SCNC: 21 MMOL/L (ref 20–29)
CREAT SERPL-MCNC: 0.55 MG/DL (ref 0.57–1)
EST. AVERAGE GLUCOSE BLD GHB EST-MCNC: 192 MG/DL
GLOBULIN SER CALC-MCNC: 3.2 G/DL (ref 1.5–4.5)
GLUCOSE SERPL-MCNC: 243 MG/DL (ref 65–99)
HBA1C MFR BLD: 8.3 % (ref 4.8–5.6)
HDLC SERPL-MCNC: 37 MG/DL
INTERPRETATION, 910389: NORMAL
LDLC SERPL CALC-MCNC: 41 MG/DL (ref 0–99)
Lab: NORMAL
POTASSIUM SERPL-SCNC: 4.4 MMOL/L (ref 3.5–5.2)
PROT SERPL-MCNC: 7.1 G/DL (ref 6–8.5)
SODIUM SERPL-SCNC: 137 MMOL/L (ref 134–144)
SPECIMEN STATUS REPORT, ROLRST: NORMAL
TRIGL SERPL-MCNC: 210 MG/DL (ref 0–149)
VLDLC SERPL CALC-MCNC: 42 MG/DL (ref 5–40)

## 2019-07-11 ENCOUNTER — TELEPHONE (OUTPATIENT)
Dept: ENDOCRINOLOGY | Age: 34
End: 2019-07-11

## 2019-07-11 ENCOUNTER — OFFICE VISIT (OUTPATIENT)
Dept: ENDOCRINOLOGY | Age: 34
End: 2019-07-11

## 2019-07-11 VITALS
BODY MASS INDEX: 29.27 KG/M2 | DIASTOLIC BLOOD PRESSURE: 68 MMHG | TEMPERATURE: 98.4 F | OXYGEN SATURATION: 98 % | SYSTOLIC BLOOD PRESSURE: 125 MMHG | HEIGHT: 67 IN | WEIGHT: 186.5 LBS | HEART RATE: 93 BPM | RESPIRATION RATE: 14 BRPM

## 2019-07-11 DIAGNOSIS — E11.65 UNCONTROLLED TYPE 2 DIABETES MELLITUS WITH HYPERGLYCEMIA, WITHOUT LONG-TERM CURRENT USE OF INSULIN (HCC): Primary | ICD-10-CM

## 2019-07-11 DIAGNOSIS — I10 ESSENTIAL HYPERTENSION: ICD-10-CM

## 2019-07-11 DIAGNOSIS — E78.2 MIXED HYPERLIPIDEMIA: ICD-10-CM

## 2019-07-11 NOTE — PATIENT INSTRUCTIONS
--------------------------------------------------------------------------------------------    Refills    -    please call your pharmacy and have them send us a refill request    Results  -  allow up to a week for lab results to be processed and reviewed. Phone calls  -  Allow upto 24 hrs.  for non-urgent calls to be retained    Prior authorization - It may take up to 2 weeks to process, depending on your insurance    Forms  -  FMLA, DMV, patient assistance, etc. will take up to 2 weeks to process    Cancellations - please notify the office in advance if you cannot keep your appointment    Samples  - will only be dispensed at visits as supply is limited      If you are having a medical emergency call 911    --------------------------------------------------------------------------------------------    STOP M- DOPA   START  ON NORVASC 10 MG A DAY         janumet  xr  50/1000 mg  One pill twice a day with meals        trulicity  Increase to 1.5 mg  Once  A week        GLIPIZIDE 5 MG INCREASE   TO 2 PILLS TWICE A DAY , TWENTY MIN BEFORE B-FAST AND DINNER       --------------------------------------------------------------------------------      Check blood sugars only twice a day by rotation as follows    First day - before b-fast and - before lunch  Second day- before dinner and  before bed time    After 2 days go back to same rotation        --------------------------------------------------------------------------------------------------------------------------------------------------------------------------    Do not skip meals  Do not eat in between meals    Reduce carbs- pasta, rice, potatoes, bread   Do not drink juices or sodas  Donot eat peanut butter     Do not eat sugar free cookies and cakes   Do not eat peaches, grapes, watermelon, oranges, pineapples,  And raisins

## 2019-07-11 NOTE — PROGRESS NOTES
Jass Stroud is a 35 y.o. female      Chief Complaint   Patient presents with    Diabetes    Follow-up       1. Have you been to the ER, urgent care clinic since your last visit? Hospitalized since your last visit? No    2. Have you seen or consulted any other health care providers outside of the [address] [place] [address] since your last visit? Include any pap smears or colon screening.  No    Wt Readings from Last 3 Encounters:   07/11/19 186 lb 8 oz (84.6 kg)   03/07/19 178 lb (80.7 kg)   08/31/18 171 lb 8 oz (77.8 kg)     Temp Readings from Last 3 Encounters:   03/07/19 98.5 °F (36.9 °C) (Oral)   08/31/18 98 °F (36.7 °C) (Oral)   05/04/18 98.6 °F (37 °C) (Oral)     BP Readings from Last 3 Encounters:   03/07/19 113/56   08/31/18 115/71   05/04/18 119/64     Pulse Readings from Last 3 Encounters:   03/07/19 86   08/31/18 88   05/04/18 97

## 2019-07-11 NOTE — PROGRESS NOTES
HISTORY OF PRESENT ILLNESS  Varsha Mccoy is a 35 y.o. female. HPI  Patient is here for f/u   DM type 2  From 2019     Gained 2 lbs     She forgot the meter,    Stopped jardiance   Noticing high sugars         Old history :    She could not tolerate bydureon ( lost more weight )  She stopped     She had jardiance  Taken and is tolerating It   She had yeast infection      Old hsitory :  She delivered the baby boy on 2017   Lost 14  lbs   She is off  insulins     She is not breast feeding any more   She is diet compliant     She is on janumet and glipizide         Old history :    She  is referred by Dr. Melanie Dobbs    H/o DM 2  For 12 years   Usually controlled well until she got pregnant  She says     pcp ( Dr. Di Douglas ) stopped  Medications - janumet xr she said after pregnancy was found out   Since, pt has noted severely high sugars and is very concerned       [place] one    Para 0   0 and she is through 6 weeks of second gestation. LMP : 2017   ELISEO : 2017     Current monitoring regimen: home blood tests - 3 times daily  Home blood sugar records: trend: fluctuating a lot  Any episodes of hypoglycemia? no      Current diabetic medications include got onto toujeo and stopped janmet xr recently .      Eye exam current (within one year): yes  Weight trend: fluctuating a bit  Prior visit with dietician: no  Current diet: \"unhealthy\" diet in general  Current exercise: no regular exercise      Mother is diabetic, on insulin , type not sure         Past Medical History:   Diagnosis Date    Abnormal Papanicolaou smear of cervix     Asthma     uses inhaler as needed    Essential hypertension     Gestational diabetes        Social History     Socioeconomic History    Marital status: SINGLE     Spouse name: Not on file    Number of children: Not on file    Years of education: Not on file    Highest education level: Not on file   Occupational History    Not on file   Social Needs    Financial resource strain: Not on file    Food insecurity:     Worry: Not on file     Inability: Not on file    Transportation needs:     Medical: Not on file     Non-medical: Not on file   Tobacco Use    Smoking status: Never Smoker    Smokeless tobacco: Never Used   Substance and Sexual Activity    Alcohol use: No    Drug use: No    Sexual activity: Yes     Partners: Male     Birth control/protection: None   Lifestyle    Physical activity:     Days per week: Not on file     Minutes per session: Not on file    Stress: Not on file   Relationships    Social connections:     Talks on phone: Not on file     Gets together: Not on file     Attends [employer]ist service: Not on file     Active member of club or organization: Not on file     Attends meetings of clubs or organizations: Not on file     Relationship status: Not on file    Intimate partner violence:     Fear of current or ex partner: Not on file     Emotionally abused: Not on file     Physically abused: Not on file     Forced sexual activity: Not on file   Other Topics Concern    Not on file   Social History Narrative    Not on file       Family History   Problem Relation Age of Onset    Diabetes Mother     Diabetes Father     MS Sister              Review of Systems   Constitutional: Negative. HENT: Negative. Eyes: Negative for pain and redness. Respiratory: Negative. Cardiovascular: Negative for chest pain, palpitations and leg swelling. Gastrointestinal: Negative. Negative for constipation. Genitourinary: Negative. Musculoskeletal: Negative for myalgias. Skin: Negative. Neurological: Negative. Endo/Heme/Allergies: Negative. Psychiatric/Behavioral: Negative for depression and memory loss. The patient does not have insomnia. Physical Exam   Constitutional: She is oriented to person, place, and time. She appears well-developed and well-nourished. HENT:   Head: Normocephalic.    Eyes: Conjunctivae and EOM are normal. Pupils are equal, round, and reactive to light. Neck: Normal range of motion. Neck supple. No JVD present. No tracheal deviation present. No thyromegaly present. Cardiovascular: Normal rate, regular rhythm and normal heart sounds. No murmur heard. Pulmonary/Chest: Breath sounds normal.   Abdominal: Soft. Bowel sounds are normal.  Musculoskeletal: Normal range of motion. Lymphadenopathy:     She has no cervical adenopathy. Neurological: She is alert and oriented to person, place, and time. She has normal reflexes. Skin: Skin is warm. Psychiatric: She has a normal mood and affect.    Diabetic foot exam: march 2018    Left: Reflexes nd     Vibratory sensation normal    Proprioception nd   Sharp/dull discrimination nd    Filament test normal sensation with micro filament   Pulse DP: 2+ (normal)   Pulse PT: nd   Deformities: None  Right: Reflexes nd   Vibratory sensation normal   Proprioception nd   Sharp/dull discrimination nd   Filament test normal sensation with micro filament   Pulse DP: 2+ (normal)   Pulse PT: nd   Deformities: None      Lab Results   Component Value Date/Time    Hemoglobin A1c 8.3 (H) 07/03/2019 04:31 PM    Hemoglobin A1c 7.6 (H) 02/15/2019 03:59 PM    Hemoglobin A1c 6.5 (H) 02/20/2018 08:25 AM    Glucose 243 (H) 07/03/2019 04:31 PM    Glucose (POC) 116 (H) 11/07/2017 06:00 AM    Glucose  08/31/2018 09:37 AM    Microalb/Creat ratio (ug/mg creat.) <3.7 02/15/2019 03:59 PM    LDL, calculated 41 07/03/2019 04:31 PM    Creatinine 0.55 (L) 07/03/2019 04:31 PM      Lab Results   Component Value Date/Time    TSH 0.717 10/05/2017 03:09 PM          ASSESSMENT and PLAN    Type 2 diabetes uncontrolled : a1c is   8.3%       From July 2019     Compared to    7.6 %    From   March 2019     Compared to    6.6 %      From    Aug 2018    Compared to  6.5 %    From feb 2018   comapred to   6.3  %       From     Oct 2017     compared to   5.8 %      From     Sept 2017     comapred to 5.7 %     From     July 2017    Compared to    6.5 %         From      June 2017      Compared to    6.4 %      From     April 28 2017 by finger stick    Compared  to  7.9 %   From finger stick on march 8 2017     Lost  Glycemic control   She is NOT DIET COMPLIANT AT ALL   On janumet xr   But  jardiance was stopped for yeast infections   She is on trulicity  8.36 mg once  A week ,  And  Increase   To 1.5 mg once a week     STAY ON GLIPIZIDE   SHE MIGHT BE NEEDING INSULIN     She stopped bydureon   Discussed med side effects  Again today   She is educated about possibility of worsening of retinopathy with aggressive glycemic control. 2. htn :   Stopped losartan 50 mg a day , for ANGIOEDEMA   START ON METHYLDOPA   WILL DO NORVASC 10 MG A DAY INSTEAD OF m-DOPA        3.  Hyperlipidemia : lipids are normal              > 50 % of time is spent on counseling   Patient voiced understanding her plan of care

## 2019-07-11 NOTE — LETTER
NOTIFICATION RETURN TO WORK / SCHOOL 
 
7/11/2019 4:20 PM 
 
Ms. Shirley Gong [address] [place] [address] Drive [facility] [id number] To Whom It May Concern: 
 
Shirley Gong is currently under the care of [id number] [place] [address]. She was seen for visit in office today, 07/11/2019. If there are questions or concerns please have the patient contact our office. Sincerely, Ta Betts MD

## 2019-07-12 LAB
ALBUMIN/CREAT UR: 4.8 MG/G CREAT (ref 0–30)
CREAT UR-MCNC: 247.4 MG/DL
MICROALBUMIN UR-MCNC: 11.9 UG/ML

## 2019-07-12 RX ORDER — GLIPIZIDE 5 MG/1
TABLET ORAL
Qty: 360 TAB | Refills: 4 | Status: SHIPPED | OUTPATIENT
Start: 2019-07-12 | End: 2020-06-25 | Stop reason: ALTCHOICE

## 2019-07-12 RX ORDER — AMLODIPINE BESYLATE 10 MG/1
10 TABLET ORAL DAILY
Qty: 30 TAB | Refills: 6 | Status: SHIPPED | OUTPATIENT
Start: 2019-07-12 | End: 2019-12-13 | Stop reason: SDUPTHER

## 2019-08-13 RX ORDER — SITAGLIPTIN AND METFORMIN HYDROCHLORIDE 50; 1000 MG/1; MG/1
TABLET, FILM COATED, EXTENDED RELEASE ORAL
Qty: 60 TAB | Refills: 0 | Status: SHIPPED | OUTPATIENT
Start: 2019-08-13 | End: 2019-12-13 | Stop reason: SDUPTHER

## 2019-09-11 DIAGNOSIS — E78.2 MIXED HYPERLIPIDEMIA: ICD-10-CM

## 2019-09-11 DIAGNOSIS — E11.65 UNCONTROLLED TYPE 2 DIABETES MELLITUS WITH HYPERGLYCEMIA, WITHOUT LONG-TERM CURRENT USE OF INSULIN (HCC): ICD-10-CM

## 2019-09-11 DIAGNOSIS — I10 ESSENTIAL HYPERTENSION: ICD-10-CM

## 2019-09-12 LAB
ALBUMIN SERPL-MCNC: 4.1 G/DL (ref 3.5–5.5)
ALBUMIN/CREAT UR: 17.4 MG/G CREAT (ref 0–30)
ALBUMIN/GLOB SERPL: 1.3 {RATIO} (ref 1.2–2.2)
ALP SERPL-CCNC: 43 IU/L (ref 39–117)
ALT SERPL-CCNC: 12 IU/L (ref 0–32)
AST SERPL-CCNC: 11 IU/L (ref 0–40)
BILIRUB SERPL-MCNC: 0.3 MG/DL (ref 0–1.2)
BUN SERPL-MCNC: 12 MG/DL (ref 6–20)
BUN/CREAT SERPL: 20 (ref 9–23)
CALCIUM SERPL-MCNC: 9.4 MG/DL (ref 8.7–10.2)
CHLORIDE SERPL-SCNC: 104 MMOL/L (ref 96–106)
CHOLEST SERPL-MCNC: 120 MG/DL (ref 100–199)
CO2 SERPL-SCNC: 21 MMOL/L (ref 20–29)
CREAT SERPL-MCNC: 0.61 MG/DL (ref 0.57–1)
CREAT UR-MCNC: 237.9 MG/DL
EST. AVERAGE GLUCOSE BLD GHB EST-MCNC: 177 MG/DL
GLOBULIN SER CALC-MCNC: 3.1 G/DL (ref 1.5–4.5)
GLUCOSE SERPL-MCNC: 163 MG/DL (ref 65–99)
HBA1C MFR BLD: 7.8 % (ref 4.8–5.6)
HDLC SERPL-MCNC: 38 MG/DL
INTERPRETATION, 910389: NORMAL
LDLC SERPL CALC-MCNC: 47 MG/DL (ref 0–99)
Lab: NORMAL
MICROALBUMIN UR-MCNC: 41.3 UG/ML
POTASSIUM SERPL-SCNC: 4.4 MMOL/L (ref 3.5–5.2)
PROT SERPL-MCNC: 7.2 G/DL (ref 6–8.5)
SODIUM SERPL-SCNC: 138 MMOL/L (ref 134–144)
TRIGL SERPL-MCNC: 176 MG/DL (ref 0–149)
VLDLC SERPL CALC-MCNC: 35 MG/DL (ref 5–40)

## 2019-09-16 ENCOUNTER — OFFICE VISIT (OUTPATIENT)
Dept: ENDOCRINOLOGY | Age: 34
End: 2019-09-16

## 2019-09-16 VITALS
RESPIRATION RATE: 16 BRPM | TEMPERATURE: 97.3 F | SYSTOLIC BLOOD PRESSURE: 112 MMHG | HEIGHT: 67 IN | DIASTOLIC BLOOD PRESSURE: 59 MMHG | BODY MASS INDEX: 28.63 KG/M2 | HEART RATE: 87 BPM | WEIGHT: 182.4 LBS | OXYGEN SATURATION: 99 %

## 2019-09-16 DIAGNOSIS — E78.2 MIXED HYPERLIPIDEMIA: ICD-10-CM

## 2019-09-16 DIAGNOSIS — I10 ESSENTIAL HYPERTENSION: ICD-10-CM

## 2019-09-16 DIAGNOSIS — E11.65 UNCONTROLLED TYPE 2 DIABETES MELLITUS WITH HYPERGLYCEMIA, WITHOUT LONG-TERM CURRENT USE OF INSULIN (HCC): Primary | ICD-10-CM

## 2019-09-16 RX ORDER — PIOGLITAZONEHYDROCHLORIDE 15 MG/1
TABLET ORAL
Qty: 90 TAB | Refills: 3 | Status: SHIPPED | OUTPATIENT
Start: 2019-09-16 | End: 2019-09-25 | Stop reason: SDUPTHER

## 2019-09-16 NOTE — PATIENT INSTRUCTIONS
--------------------------------------------------------------------------------------------    Refills    -    please call your pharmacy and have them send us a refill request    Results  -  allow up to a week for lab results to be processed and reviewed. Phone calls  -  Allow upto 24 hrs.  for non-urgent calls to be retained    Prior authorization - It may take up to 2 weeks to process, depending on your insurance    Forms  -  FMLA, DMV, patient assistance, etc. will take up to 2 weeks to process    Cancellations - please notify the office in advance if you cannot keep your appointment    Samples  - will only be dispensed at visits as supply is limited      If you are having a medical emergency call 911    --------------------------------------------------------------------------------------------     NORVASC 10 MG A DAY    Start on pioglitazone 15 mg a day for a month and then increase to 30 mg a day        janumet  xr  50/1000 mg  One pill twice a day with meals        trulicity   1.5 mg  Once  A week        GLIPIZIDE 5 MG INCREASE   TO 2 PILLS TWICE A DAY , TWENTY MIN BEFORE B-FAST AND DINNER       --------------------------------------------------------------------------------      Check blood sugars only twice a day by rotation as follows    First day - before b-fast and - before lunch  Second day- before dinner and  before bed time    After 2 days go back to same rotation        --------------------------------------------------------------------------------------------------------------------------------------------------------------------------    Do not skip meals  Do not eat in between meals    Reduce carbs- pasta, rice, potatoes, bread   Do not drink juices or sodas  Donot eat peanut butter     Do not eat sugar free cookies and cakes   Do not eat peaches, grapes, watermelon, oranges, pineapples,  And raisins

## 2019-09-16 NOTE — PROGRESS NOTES
HISTORY OF PRESENT ILLNESS  Kika Clark is a 29 y.o. female. HPI  Patient is here for f/u   DM type 2  From 2019     SHE IS DOING TLC   COMPLIANT WITH MEDS       Old history  :     Gained 2 lbs   She forgot the meter,    Stopped jardiance   Noticing high sugars         Old history :    She could not tolerate bydureon ( lost more weight )  She stopped     She had jardiance  Taken and is tolerating It   She had yeast infection      Old hsitory :  She delivered the baby boy on 2017   Lost 14  lbs   She is off  insulins     She is not breast feeding any more   She is diet compliant     She is on janumet and glipizide         Old history :    She  is referred by Dr. Alek Mclean    H/o DM 2  For 12 years   Usually controlled well until she got pregnant  She says     pcp ( Dr. Nicolás Graham ) stopped  Medications - janumet xr she said after pregnancy was found out   Since, pt has noted severely high sugars and is very concerned       [place] one    Para 0   0 and she is through 6 weeks of second gestation. LMP : 2017   ELISEO : 2017     Current monitoring regimen: home blood tests - 3 times daily  Home blood sugar records: trend: fluctuating a lot  Any episodes of hypoglycemia? no      Current diabetic medications include got onto toujeo and stopped janmet xr recently .      Eye exam current (within one year): yes  Weight trend: fluctuating a bit  Prior visit with dietician: no  Current diet: \"unhealthy\" diet in general  Current exercise: no regular exercise      Mother is diabetic, on insulin , type not sure         Past Medical History:   Diagnosis Date    Abnormal Papanicolaou smear of cervix     Asthma     uses inhaler as needed    Essential hypertension     Gestational diabetes        Social History     Socioeconomic History    Marital status: SINGLE     Spouse name: Not on file    Number of children: Not on file    Years of education: Not on file    Highest education level: Not on file   Occupational History    Not on file   Social Needs    Financial resource strain: Not on file    Food insecurity:     Worry: Not on file     Inability: Not on file    Transportation needs:     Medical: Not on file     Non-medical: Not on file   Tobacco Use    Smoking status: Never Smoker    Smokeless tobacco: Never Used   Substance and Sexual Activity    Alcohol use: No    Drug use: No    Sexual activity: Yes     Partners: Male     Birth control/protection: None   Lifestyle    Physical activity:     Days per week: Not on file     Minutes per session: Not on file    Stress: Not on file   Relationships    Social connections:     Talks on phone: Not on file     Gets together: Not on file     Attends Taoism service: Not on file     Active member of club or organization: Not on file     Attends meetings of clubs or organizations: Not on file     Relationship status: Not on file    Intimate partner violence:     Fear of current or ex partner: Not on file     Emotionally abused: Not on file     Physically abused: Not on file     Forced sexual activity: Not on file   Other Topics Concern    Not on file   Social History Narrative    Not on file       Family History   Problem Relation Age of Onset    Diabetes Mother     Diabetes Father     MS Sister              Review of Systems   Constitutional: Negative. HENT: Negative. Eyes: Negative for pain and redness. Respiratory: Negative. Cardiovascular: Negative for chest pain, palpitations and leg swelling. Gastrointestinal: Negative. Negative for constipation. Genitourinary: Negative. Musculoskeletal: Negative for myalgias. Skin: Negative. Neurological: Negative. Endo/Heme/Allergies: Negative. Psychiatric/Behavioral: Negative for depression and memory loss. The patient does not have insomnia. Physical Exam   Constitutional: She is oriented to person, place, and time. She appears well-developed and well-nourished. HENT:   Head: Normocephalic. Eyes: Conjunctivae and EOM are normal. Pupils are equal, round, and reactive to light. Neck: Normal range of motion. Neck supple. No JVD present. No tracheal deviation present. No thyromegaly present. Cardiovascular: Normal rate, regular rhythm and normal heart sounds. No murmur heard. Pulmonary/Chest: Breath sounds normal.   Abdominal: Soft. Bowel sounds are normal.  Musculoskeletal: Normal range of motion. Lymphadenopathy:     She has no cervical adenopathy. Neurological: She is alert and oriented to person, place, and time. She has normal reflexes. Skin: Skin is warm. Psychiatric: She has a normal mood and affect.    Diabetic foot exam: march 2018    Left: Reflexes nd     Vibratory sensation normal    Proprioception nd   Sharp/dull discrimination nd    Filament test normal sensation with micro filament   Pulse DP: 2+ (normal)   Pulse PT: nd   Deformities: None  Right: Reflexes nd   Vibratory sensation normal   Proprioception nd   Sharp/dull discrimination nd   Filament test normal sensation with micro filament   Pulse DP: 2+ (normal)   Pulse PT: nd   Deformities: None      Lab Results   Component Value Date/Time    Hemoglobin A1c 7.8 (H) 09/11/2019 09:26 AM    Hemoglobin A1c 8.3 (H) 07/03/2019 04:31 PM    Hemoglobin A1c 7.6 (H) 02/15/2019 03:59 PM    Glucose 163 (H) 09/11/2019 09:26 AM    Glucose (POC) 116 (H) 11/07/2017 06:00 AM    Glucose  08/31/2018 09:37 AM    Microalb/Creat ratio (ug/mg creat.) 17.4 09/11/2019 09:26 AM    LDL, calculated 47 09/11/2019 09:26 AM    Creatinine 0.61 09/11/2019 09:26 AM      Lab Results   Component Value Date/Time    TSH 0.717 10/05/2017 03:09 PM          ASSESSMENT and PLAN    Type 2 diabetes uncontrolled : a1c is  7.8  %    From    August 2019   Compared to    8.3%       From July 2019     Compared to    7.6 %    From   March 2019     Compared to    6.6 %      From    Aug 2018    Compared to  6.5 %    From feb 2018 comapred to   6.3  %       From     Oct 2017     compared to   5.8 %      From     Sept 2017     comapred to   5.7 %     From     July 2017    Compared to    6.5 %         From      June 2017      Compared to    6.4 %      From     April 28 2017 by finger stick    Compared  to  7.9 %   From finger stick on march 8 2017     Improved  Glycemic control some   She is  DIET COMPLIANT better   On janumet xr   But  jardiance was stopped for yeast infections   She is on trulicity  1.5 mg once a week   STAY ON GLIPIZIDE   SHE MIGHT BE NEEDING INSULIN , BUT WILL TRY ACTOS   Discussed med side effects  Again today   She is educated about possibility of worsening of retinopathy with aggressive glycemic control. 2. htn :   Stopped losartan 50 mg a day , for ANGIOEDEMA   STOPPED METHYLDOPA   ON NORVASC 10 MG A DAY INSTEAD OF m-DOPA        3.  Hyperlipidemia : lipids are normal              > 50 % of time is spent on counseling   Patient voiced understanding her plan of care

## 2019-09-16 NOTE — PROGRESS NOTES
Lab Results   Component Value Date/Time    Hemoglobin A1c 7.8 (H) 09/11/2019 09:26 AM    Hemoglobin A1c 8.3 (H) 07/03/2019 04:31 PM    Hemoglobin A1c 7.6 (H) 02/15/2019 03:59 PM     Lab Results   Component Value Date/Time    Microalb/Creat ratio (ug/mg creat.) 17.4 09/11/2019 09:26 AM     Diabetic Foot and Eye Exam HM Status   Topic Date Due    Eye Exam  07/24/1995    Diabetic Foot Care  07/14/2020     Lab Results   Component Value Date/Time    Cholesterol, total 120 09/11/2019 09:26 AM    HDL Cholesterol 38 (L) 09/11/2019 09:26 AM    LDL, calculated 47 09/11/2019 09:26 AM    VLDL, calculated 35 09/11/2019 09:26 AM    Triglyceride 176 (H) 09/11/2019 09:26 AM     Wt Readings from Last 3 Encounters:   09/16/19 182 lb 6.4 oz (82.7 kg)   07/11/19 186 lb 8 oz (84.6 kg)   03/07/19 178 lb (80.7 kg)     Temp Readings from Last 3 Encounters:   09/16/19 97.3 °F (36.3 °C)   07/11/19 98.4 °F (36.9 °C) (Oral)   03/07/19 98.5 °F (36.9 °C) (Oral)     BP Readings from Last 3 Encounters:   09/16/19 112/59   07/11/19 125/68   03/07/19 113/56     Pulse Readings from Last 3 Encounters:   09/16/19 87   07/11/19 93   03/07/19 86

## 2019-09-17 ENCOUNTER — TELEPHONE (OUTPATIENT)
Dept: ENDOCRINOLOGY | Age: 34
End: 2019-09-17

## 2019-09-17 DIAGNOSIS — E11.65 UNCONTROLLED TYPE 2 DIABETES MELLITUS WITH HYPERGLYCEMIA, WITH LONG-TERM CURRENT USE OF INSULIN (HCC): Primary | ICD-10-CM

## 2019-09-17 DIAGNOSIS — Z79.4 UNCONTROLLED TYPE 2 DIABETES MELLITUS WITH HYPERGLYCEMIA, WITH LONG-TERM CURRENT USE OF INSULIN (HCC): Primary | ICD-10-CM

## 2019-09-25 RX ORDER — PIOGLITAZONEHYDROCHLORIDE 15 MG/1
TABLET ORAL
Qty: 30 TAB | Refills: 0 | Status: SHIPPED | OUTPATIENT
Start: 2019-09-25 | End: 2019-10-29 | Stop reason: SDUPTHER

## 2019-10-29 RX ORDER — PIOGLITAZONEHYDROCHLORIDE 15 MG/1
TABLET ORAL
Qty: 30 TAB | Refills: 0 | Status: SHIPPED | OUTPATIENT
Start: 2019-10-29 | End: 2019-11-13 | Stop reason: DRUGHIGH

## 2019-10-29 NOTE — TELEPHONE ENCOUNTER
PCP: No primary care provider on file. Last appt: 9/16/2019  Future Appointments   Date Time Provider Bibiana Mckeon   12/6/2019  4:15 PM CDE NURSE DAYANA SCHMID [facility] [facility] [address]   12/13/2019  3:30 PM MD DAYANA Guerrero SCHED       Requested Prescriptions     Pending Prescriptions Disp Refills    pioglitazone (ACTOS) 15 mg tablet 30 Tab 0     Sig: Take one tablet by mouth once daily.

## 2019-11-13 RX ORDER — PIOGLITAZONEHYDROCHLORIDE 30 MG/1
30 TABLET ORAL DAILY
Qty: 30 TAB | Refills: 6 | Status: SHIPPED | OUTPATIENT
Start: 2019-11-13 | End: 2020-06-25 | Stop reason: SDUPTHER

## 2019-11-13 NOTE — TELEPHONE ENCOUNTER
Patient completley out of Actos. Patient says Dr. Neva Pulido changed dose but never called in new dose. Patient says pharmacy will not fill because too soon. Please advise patient.

## 2019-12-06 ENCOUNTER — HOSPITAL ENCOUNTER (OUTPATIENT)
Dept: LAB | Age: 34
Discharge: HOME | End: 2019-12-06

## 2019-12-06 DIAGNOSIS — E11.65 UNCONTROLLED TYPE 2 DIABETES MELLITUS WITH HYPERGLYCEMIA, WITH LONG-TERM CURRENT USE OF INSULIN (HCC): ICD-10-CM

## 2019-12-06 DIAGNOSIS — Z79.4 UNCONTROLLED TYPE 2 DIABETES MELLITUS WITH HYPERGLYCEMIA, WITH LONG-TERM CURRENT USE OF INSULIN (HCC): ICD-10-CM

## 2019-12-06 LAB
ALBUMIN SERPL-MCNC: 3.2 G/DL (ref 3.5–5)
ALBUMIN/GLOB SERPL: 0.7 {RATIO} (ref 1.1–2.2)
ALP SERPL-CCNC: 53 U/L (ref 45–117)
ALT SERPL-CCNC: 32 U/L (ref 12–78)
ANION GAP SERPL CALC-SCNC: 8 MMOL/L (ref 5–15)
AST SERPL-CCNC: 36 U/L (ref 15–37)
BILIRUB SERPL-MCNC: 0.2 MG/DL (ref 0.2–1)
BUN SERPL-MCNC: 11 MG/DL (ref 6–20)
BUN/CREAT SERPL: 16 (ref 12–20)
CALCIUM SERPL-MCNC: 8.6 MG/DL (ref 8.5–10.1)
CHLORIDE SERPL-SCNC: 105 MMOL/L (ref 97–108)
CHOLEST SERPL-MCNC: 127 MG/DL
CO2 SERPL-SCNC: 25 MMOL/L (ref 21–32)
CREAT SERPL-MCNC: 0.69 MG/DL (ref 0.55–1.02)
CREAT UR-MCNC: 178 MG/DL
EST. AVERAGE GLUCOSE BLD GHB EST-MCNC: 183 MG/DL
GLOBULIN SER CALC-MCNC: 4.4 G/DL (ref 2–4)
GLUCOSE SERPL-MCNC: 120 MG/DL (ref 65–100)
HBA1C MFR BLD: 8 % (ref 4–5.6)
HDLC SERPL-MCNC: 45 MG/DL
HDLC SERPL: 2.8 {RATIO} (ref 0–5)
LDLC SERPL CALC-MCNC: 55.2 MG/DL (ref 0–100)
LIPID PROFILE,FLP: NORMAL
MICROALBUMIN UR-MCNC: 1.52 MG/DL
MICROALBUMIN/CREAT UR-RTO: 9 MG/G (ref 0–30)
POTASSIUM SERPL-SCNC: 4 MMOL/L (ref 3.5–5.1)
PROT SERPL-MCNC: 7.6 G/DL (ref 6.4–8.2)
SODIUM SERPL-SCNC: 138 MMOL/L (ref 136–145)
TRIGL SERPL-MCNC: 134 MG/DL (ref ?–150)
VLDLC SERPL CALC-MCNC: 26.8 MG/DL

## 2019-12-13 ENCOUNTER — OFFICE VISIT (OUTPATIENT)
Dept: ENDOCRINOLOGY | Age: 34
End: 2019-12-13

## 2019-12-13 VITALS
HEART RATE: 99 BPM | TEMPERATURE: 97.7 F | DIASTOLIC BLOOD PRESSURE: 77 MMHG | RESPIRATION RATE: 18 BRPM | OXYGEN SATURATION: 97 % | BODY MASS INDEX: 30.48 KG/M2 | HEIGHT: 67 IN | SYSTOLIC BLOOD PRESSURE: 131 MMHG | WEIGHT: 194.2 LBS

## 2019-12-13 DIAGNOSIS — I10 ESSENTIAL HYPERTENSION: ICD-10-CM

## 2019-12-13 DIAGNOSIS — E78.2 MIXED HYPERLIPIDEMIA: ICD-10-CM

## 2019-12-13 DIAGNOSIS — E11.65 UNCONTROLLED TYPE 2 DIABETES MELLITUS WITH HYPERGLYCEMIA, WITHOUT LONG-TERM CURRENT USE OF INSULIN (HCC): Primary | ICD-10-CM

## 2019-12-13 RX ORDER — AMLODIPINE BESYLATE 10 MG/1
10 TABLET ORAL DAILY
Qty: 30 TAB | Refills: 6 | Status: SHIPPED | OUTPATIENT
Start: 2019-12-13 | End: 2020-06-25 | Stop reason: SDUPTHER

## 2019-12-13 RX ORDER — AMOXICILLIN AND CLAVULANATE POTASSIUM 875; 125 MG/1; MG/1
TABLET, FILM COATED ORAL
COMMUNITY
Start: 2019-12-06 | End: 2020-06-25 | Stop reason: ALTCHOICE

## 2019-12-13 NOTE — PATIENT INSTRUCTIONS
-------------------------------------------------------------------------------------------- Refills    -    please call your pharmacy and have them send us a refill request 
 
Results  -  allow up to a week for lab results to be processed and reviewed. Phone calls  -  Allow upto 24 hrs. for non-urgent calls to be retained Prior authorization - It may take up to 2 weeks to process, depending on your insurance Forms  -  FMLA, DMV, patient assistance, etc. will take up to 2 weeks to process Cancellations - please notify the office in advance if you cannot keep your appointment Samples  - will only be dispensed at visits as supply is limited If you are having a medical emergency call 911 
 
-------------------------------------------------------------------------------------------- 
 
 NORVASC 10 MG A DAY pioglitazone  30 mg a day  
 
 
 janumet  xr  50/1000 mg  One pill twice a day with meals Stop  trulicity Start   On  Ozempic   1  mg  Once  A week GLIPIZIDE 5 MG INCREASE   TO 2 PILLS TWICE A DAY , TWENTY MIN BEFORE B-FAST AND DINNER  
 
 
-------------------------------------------------------------------------------- Check blood sugars only twice a day by rotation as follows First day - before b-fast and - before lunch Second day- before dinner and  before bed time After 2 days go back to same rotation 
 
 
 
-------------------------------------------------------------------------------------------------------------------------------------------------------------------------- Do not skip meals Do not eat in between meals Reduce carbs- pasta, rice, potatoes, bread Do not drink juices or sodas Donot eat peanut butter Do not eat sugar free cookies and cakes Do not eat peaches, grapes, watermelon, oranges, pineapples,  And raisins

## 2019-12-13 NOTE — PROGRESS NOTES
HISTORY OF PRESENT ILLNESS  Miguel Ybarra is a 29 y.o. female. HPI  Patient is here for f/u   DM type 2  From 2019     Gained 12 lbs   Got onto actos 30 mg a day   Not very diet compliant       Old history  :     SHE IS DOING TLC   COMPLIANT WITH MEDS       Old history  :     Gained 2 lbs   She forgot the meter,    Stopped jardiance   Noticing high sugars         Old history :    She could not tolerate bydureon ( lost more weight )  She stopped     She had jardiance  Taken and is tolerating It   She had yeast infection      Old hsitory :  She delivered the baby boy on 2017   Lost 14  lbs   She is off  insulins     She is not breast feeding any more   She is diet compliant     She is on janumet and glipizide         Old history :    She  is referred by Dr. Wilfredo Flores    H/o DM 2  For 12 years   Usually controlled well until she got pregnant  She says     pcp ( Dr. Veronica Magana ) stopped  Medications - janumet xr she said after pregnancy was found out   Since, pt has noted severely high sugars and is very concerned       [place] one    Para 0   0 and she is through 6 weeks of second gestation. LMP : 2017   ELISEO : 2017     Current monitoring regimen: home blood tests - 3 times daily  Home blood sugar records: trend: fluctuating a lot  Any episodes of hypoglycemia? no      Current diabetic medications include got onto toujeo and stopped janmet xr recently .      Eye exam current (within one year): yes  Weight trend: fluctuating a bit  Prior visit with dietician: no  Current diet: \"unhealthy\" diet in general  Current exercise: no regular exercise      Mother is diabetic, on insulin , type not sure         Past Medical History:   Diagnosis Date    Abnormal Papanicolaou smear of cervix     Asthma     uses inhaler as needed    Essential hypertension     Gestational diabetes        Social History     Socioeconomic History    Marital status: SINGLE     Spouse name: Not on file    Number of children: Not on file    Years of education: Not on file    Highest education level: Not on file   Occupational History    Not on file   Social Needs    Financial resource strain: Not on file    Food insecurity:     Worry: Not on file     Inability: Not on file    Transportation needs:     Medical: Not on file     Non-medical: Not on file   Tobacco Use    Smoking status: Never Smoker    Smokeless tobacco: Never Used   Substance and Sexual Activity    Alcohol use: No    Drug use: No    Sexual activity: Yes     Partners: Male     Birth control/protection: None   Lifestyle    Physical activity:     Days per week: Not on file     Minutes per session: Not on file    Stress: Not on file   Relationships    Social connections:     Talks on phone: Not on file     Gets together: Not on file     Attends Scientologist service: Not on file     Active member of club or organization: Not on file     Attends meetings of clubs or organizations: Not on file     Relationship status: Not on file    Intimate partner violence:     Fear of current or ex partner: Not on file     Emotionally abused: Not on file     Physically abused: Not on file     Forced sexual activity: Not on file   Other Topics Concern    Not on file   Social History Narrative    Not on file       Family History   Problem Relation Age of Onset    Diabetes Mother     Diabetes Father     MS Sister              Review of Systems   Constitutional: Negative. HENT: Negative. Eyes: Negative for pain and redness. Respiratory: Negative. Cardiovascular: Negative for chest pain, palpitations and leg swelling. Gastrointestinal: Negative. Negative for constipation. Genitourinary: Negative. Musculoskeletal: Negative for myalgias. Skin: Negative. Neurological: Negative. Endo/Heme/Allergies: Negative. Psychiatric/Behavioral: Negative for depression and memory loss. The patient does not have insomnia.         Physical Exam   Constitutional: She is oriented to person, place, and time. She appears well-developed and well-nourished. HENT:   Head: Normocephalic. Eyes: Conjunctivae and EOM are normal. Pupils are equal, round, and reactive to light. Neck: Normal range of motion. Neck supple. No JVD present. No tracheal deviation present. No thyromegaly present. Cardiovascular: Normal rate, regular rhythm and normal heart sounds. No murmur heard. Pulmonary/Chest: Breath sounds normal.   Abdominal: Soft. Bowel sounds are normal.  Musculoskeletal: Normal range of motion. Lymphadenopathy:     She has no cervical adenopathy. Neurological: She is alert and oriented to person, place, and time. She has normal reflexes. Skin: Skin is warm. Psychiatric: She has a normal mood and affect.    Diabetic foot exam: march 2018    Left: Reflexes nd     Vibratory sensation normal    Proprioception nd   Sharp/dull discrimination nd    Filament test normal sensation with micro filament   Pulse DP: 2+ (normal)   Pulse PT: nd   Deformities: None  Right: Reflexes nd   Vibratory sensation normal   Proprioception nd   Sharp/dull discrimination nd   Filament test normal sensation with micro filament   Pulse DP: 2+ (normal)   Pulse PT: nd   Deformities: None      Lab Results   Component Value Date/Time    Hemoglobin A1c 8.0 (H) 12/06/2019 04:27 PM    Hemoglobin A1c 7.8 (H) 09/11/2019 09:26 AM    Hemoglobin A1c 8.3 (H) 07/03/2019 04:31 PM    Glucose 120 (H) 12/06/2019 04:27 PM    Glucose (POC) 116 (H) 11/07/2017 06:00 AM    Glucose  08/31/2018 09:37 AM    Microalbumin/Creat ratio (mg/g creat) 9 12/06/2019 04:27 PM    Microalbumin,urine random 1.52 12/06/2019 04:27 PM    LDL, calculated 55.2 12/06/2019 04:27 PM    Creatinine 0.69 12/06/2019 04:27 PM      Lab Results   Component Value Date/Time    TSH 0.717 10/05/2017 03:09 PM          ASSESSMENT and PLAN    Type 2 diabetes uncontrolled : a1c is  7.8  %    From    August 2019   Compared to    8.3%       From July 2019     Compared to    7.6 %    From   March 2019     Compared to    6.6 %      From    Aug 2018    Compared to  6.5 %    From feb 2018   comapred to   6.3  %       From     Oct 2017     compared to   5.8 %      From     Sept 2017     comapred to   5.7 %     From     July 2017    Compared to    6.5 %         From      June 2017      Compared to    6.4 %      From     April 28 2017 by finger stick    Compared  to  7.9 %   From finger stick on march 8 2017     plateaued  Glycemic control some   She is NOT   DIET COMPLIANT   No meter    On janumet xr   But  jardiance was stopped for yeast infections  SUGGESTED  change  From  trulicity  To OZEMPIC  And pt did not like it because of the needle   STAY ON GLIPIZIDE   ON ACTOS 30 MG  A DAY   Discussed med side effects  Again today   She is educated about possibility of worsening of retinopathy with aggressive glycemic control. 2. htn :   Stopped losartan 50 mg a day , for ANGIOEDEMA   STOPPED METHYLDOPA   ON NORVASC 10 MG A DAY INSTEAD OF m-DOPA        3.  Hyperlipidemia : lipids are normal        > 50 % of time is spent on counseling   Patient voiced understanding her plan of care

## 2019-12-13 NOTE — PROGRESS NOTES
1. Have you been to the ER, urgent care clinic since your last visit? [facility] Hospitalized since your last visit? No    2. Have you seen or consulted any other health care providers outside of the [address] [place] [address] since your last visit? Include any pap smears or colon screening. No    Wt Readings from Last 3 Encounters:   12/13/19 194 lb 3.2 oz (88.1 kg)   09/16/19 182 lb 6.4 oz (82.7 kg)   07/11/19 186 lb 8 oz (84.6 kg)     Temp Readings from Last 3 Encounters:   12/13/19 97.7 °F (36.5 °C) (Oral)   09/16/19 97.3 °F (36.3 °C)   07/11/19 98.4 °F (36.9 °C) (Oral)     BP Readings from Last 3 Encounters:   12/13/19 131/77   09/16/19 112/59   07/11/19 125/68     Pulse Readings from Last 3 Encounters:   12/13/19 99   09/16/19 87   07/11/19 93     Lab Results   Component Value Date/Time    Hemoglobin A1c 8.0 (H) 12/06/2019 04:27 PM    Hemoglobin A1c (POC) 6.6 08/31/2018 09:37 AM     Patient does not have meter today.

## 2020-01-30 RX ORDER — DULAGLUTIDE 1.5 MG/.5ML
INJECTION, SOLUTION SUBCUTANEOUS
Qty: 2 ML | Refills: 3 | Status: SHIPPED | OUTPATIENT
Start: 2020-01-30 | End: 2020-06-25 | Stop reason: SDUPTHER

## 2020-04-09 ENCOUNTER — TELEPHONE (OUTPATIENT)
Dept: ENDOCRINOLOGY | Age: 35
End: 2020-04-09

## 2020-04-09 DIAGNOSIS — I10 ESSENTIAL HYPERTENSION: ICD-10-CM

## 2020-04-09 DIAGNOSIS — E78.2 MIXED HYPERLIPIDEMIA: ICD-10-CM

## 2020-04-09 DIAGNOSIS — E11.65 UNCONTROLLED TYPE 2 DIABETES MELLITUS WITH HYPERGLYCEMIA, WITH LONG-TERM CURRENT USE OF INSULIN (HCC): Primary | ICD-10-CM

## 2020-04-09 DIAGNOSIS — Z79.4 UNCONTROLLED TYPE 2 DIABETES MELLITUS WITH HYPERGLYCEMIA, WITH LONG-TERM CURRENT USE OF INSULIN (HCC): Primary | ICD-10-CM

## 2020-04-09 NOTE — TELEPHONE ENCOUNTER
Order placed for pt per verbal order with read back from Dr. Ishaan Wilkins 04/09/20      Lab slips mailed

## 2020-05-27 ENCOUNTER — TELEPHONE (OUTPATIENT)
Dept: ENDOCRINOLOGY | Age: 35
End: 2020-05-27

## 2020-05-27 DIAGNOSIS — E11.65 UNCONTROLLED TYPE 2 DIABETES MELLITUS WITH HYPERGLYCEMIA, WITH LONG-TERM CURRENT USE OF INSULIN (HCC): Primary | ICD-10-CM

## 2020-05-27 DIAGNOSIS — I10 ESSENTIAL HYPERTENSION: ICD-10-CM

## 2020-05-27 DIAGNOSIS — Z79.4 UNCONTROLLED TYPE 2 DIABETES MELLITUS WITH HYPERGLYCEMIA, WITH LONG-TERM CURRENT USE OF INSULIN (HCC): Primary | ICD-10-CM

## 2020-05-27 DIAGNOSIS — E78.2 MIXED HYPERLIPIDEMIA: ICD-10-CM

## 2020-06-01 ENCOUNTER — TELEPHONE (OUTPATIENT)
Dept: ENDOCRINOLOGY | Age: 35
End: 2020-06-01

## 2020-06-01 NOTE — TELEPHONE ENCOUNTER
Patient states her job needs a letter of her medical condition. She needs it so she can continue to work at home.

## 2020-06-01 NOTE — LETTER
6/1/2020 4:18 PM 
 
Ms. Houston Caal [address] [place] [phone] This is to notify that this patient is at high risk for developing complications if he/she gets affected by SARS COVID -2 virus and must be provided with an environment where he /she will have least human contact to avoid exposure to the virus. Sincerely, Jo Ann Steele MD

## 2020-06-01 NOTE — TELEPHONE ENCOUNTER
As in the past, I am printing same format letter asking employers to use their discretion   But I am not going to say - stay home and work    Explain to her that this is the same format - I have used since pandemic began

## 2020-06-20 LAB
ALBUMIN SERPL-MCNC: 4.3 G/DL (ref 3.8–4.8)
ALBUMIN/CREAT UR: 5 MG/G CREAT (ref 0–29)
ALBUMIN/GLOB SERPL: 1.3 {RATIO} (ref 1.2–2.2)
ALP SERPL-CCNC: 59 IU/L (ref 39–117)
ALT SERPL-CCNC: 23 IU/L (ref 0–32)
AST SERPL-CCNC: 33 IU/L (ref 0–40)
BILIRUB SERPL-MCNC: 0.2 MG/DL (ref 0–1.2)
BUN SERPL-MCNC: 12 MG/DL (ref 6–20)
BUN/CREAT SERPL: 16 (ref 9–23)
CALCIUM SERPL-MCNC: 9.6 MG/DL (ref 8.7–10.2)
CHLORIDE SERPL-SCNC: 98 MMOL/L (ref 96–106)
CHOLEST SERPL-MCNC: 146 MG/DL (ref 100–199)
CO2 SERPL-SCNC: 23 MMOL/L (ref 20–29)
CREAT SERPL-MCNC: 0.77 MG/DL (ref 0.57–1)
CREAT UR-MCNC: 290.4 MG/DL
EST. AVERAGE GLUCOSE BLD GHB EST-MCNC: 203 MG/DL
GLOBULIN SER CALC-MCNC: 3.4 G/DL (ref 1.5–4.5)
GLUCOSE SERPL-MCNC: 180 MG/DL (ref 65–99)
HBA1C MFR BLD: 8.7 % (ref 4.8–5.6)
HDLC SERPL-MCNC: 45 MG/DL
INTERPRETATION, 910389: NORMAL
LDLC SERPL CALC-MCNC: 71 MG/DL (ref 0–99)
Lab: NORMAL
MICROALBUMIN UR-MCNC: 13.5 UG/ML
POTASSIUM SERPL-SCNC: 4.2 MMOL/L (ref 3.5–5.2)
PROT SERPL-MCNC: 7.7 G/DL (ref 6–8.5)
SODIUM SERPL-SCNC: 137 MMOL/L (ref 134–144)
TRIGL SERPL-MCNC: 149 MG/DL (ref 0–149)
VLDLC SERPL CALC-MCNC: 30 MG/DL (ref 5–40)

## 2020-06-25 ENCOUNTER — OFFICE VISIT (OUTPATIENT)
Dept: ENDOCRINOLOGY | Age: 35
End: 2020-06-25

## 2020-06-25 VITALS
OXYGEN SATURATION: 97 % | WEIGHT: 197.6 LBS | BODY MASS INDEX: 31.01 KG/M2 | RESPIRATION RATE: 20 BRPM | HEART RATE: 95 BPM | DIASTOLIC BLOOD PRESSURE: 66 MMHG | HEIGHT: 67 IN | SYSTOLIC BLOOD PRESSURE: 113 MMHG | TEMPERATURE: 98.5 F

## 2020-06-25 DIAGNOSIS — E78.2 MIXED HYPERLIPIDEMIA: ICD-10-CM

## 2020-06-25 DIAGNOSIS — I10 ESSENTIAL HYPERTENSION: ICD-10-CM

## 2020-06-25 DIAGNOSIS — Z79.4 UNCONTROLLED TYPE 2 DIABETES MELLITUS WITH HYPERGLYCEMIA, WITH LONG-TERM CURRENT USE OF INSULIN (HCC): Primary | ICD-10-CM

## 2020-06-25 DIAGNOSIS — E11.65 UNCONTROLLED TYPE 2 DIABETES MELLITUS WITH HYPERGLYCEMIA, WITH LONG-TERM CURRENT USE OF INSULIN (HCC): Primary | ICD-10-CM

## 2020-06-25 RX ORDER — INSULIN GLARGINE 300 U/ML
INJECTION, SOLUTION SUBCUTANEOUS
Qty: 3 PEN | Refills: 6 | Status: SHIPPED | OUTPATIENT
Start: 2020-06-25 | End: 2020-12-28

## 2020-06-25 RX ORDER — DULAGLUTIDE 1.5 MG/.5ML
INJECTION, SOLUTION SUBCUTANEOUS
Qty: 2 ML | Refills: 6 | Status: SHIPPED | OUTPATIENT
Start: 2020-06-25 | End: 2020-07-08 | Stop reason: SDUPTHER

## 2020-06-25 RX ORDER — PIOGLITAZONEHYDROCHLORIDE 30 MG/1
30 TABLET ORAL DAILY
Qty: 30 TAB | Refills: 6 | Status: SHIPPED | OUTPATIENT
Start: 2020-06-25 | End: 2020-07-08

## 2020-06-25 RX ORDER — FLASH GLUCOSE SENSOR
KIT MISCELLANEOUS
Qty: 2 KIT | Refills: 6 | Status: SHIPPED | OUTPATIENT
Start: 2020-06-25 | End: 2021-06-14 | Stop reason: ALTCHOICE

## 2020-06-25 RX ORDER — NATEGLINIDE 120 MG/1
120 TABLET ORAL
Qty: 90 TAB | Refills: 6 | Status: SHIPPED | OUTPATIENT
Start: 2020-06-25 | End: 2021-01-11

## 2020-06-25 RX ORDER — AMLODIPINE BESYLATE 10 MG/1
10 TABLET ORAL DAILY
Qty: 30 TAB | Refills: 6 | Status: SHIPPED | OUTPATIENT
Start: 2020-06-25 | End: 2021-03-03

## 2020-06-25 RX ORDER — SITAGLIPTIN AND METFORMIN HYDROCHLORIDE 50; 1000 MG/1; MG/1
TABLET, FILM COATED, EXTENDED RELEASE ORAL
Qty: 60 TAB | Refills: 6 | Status: SHIPPED | OUTPATIENT
Start: 2020-06-25 | End: 2021-03-12

## 2020-06-25 NOTE — LETTER
6/25/2020 11:14 AM 
 
Ms. Alayna Grider [address] [place] [phone] This is to notify that this patient is at high risk for developing complications if he/she gets affected by SARS COVID -2 virus and must be provided with an environment where he /she will have least human contact to avoid exposure to the virus. Sincerely, Trenton Moore MD

## 2020-06-25 NOTE — PATIENT INSTRUCTIONS
-------------------------------------------------------------------------------------------- Refills    -    please call your pharmacy and have them send us a refill request 
 
Results  -  allow up to a week for lab results to be processed and reviewed. Phone calls  -  Allow upto 24 hrs. for non-urgent calls to be retained Prior authorization - It may take up to 2 weeks to process, depending on your insurance Forms  -  FMLA, DMV, patient assistance, etc. will take up to 2 weeks to process Cancellations - please notify the office in advance if you cannot keep your appointment Samples  - will only be dispensed at visits as supply is limited If you are having a medical emergency call 911 
 
---------------------------------------------------------------------------------google  
dexcom  ( cost ) Joanna  ( cheap and easy to use )  
 
 
--------------------------------------------------------------------- 
 
 NORVASC 10 MG A DAY pioglitazone  30 mg a day  
 
 
 janumet  xr  50/1000 mg  One pill twice a day with meals   
 
 
  trulicity 1.5 mg a week Stop GLIPIZIDE Start on   Toujeo/ tresiba   30  Units   At night Start on starlix  120 mg right before each meal  ( 3 times a day )  
 
 
-------------------------------------------------------------------------------- Check blood sugars only twice a day by rotation as follows First day - before b-fast and - before lunch Second day- before dinner and  before bed time After 2 days go back to same rotation 
 
 
 
-------------------------------------------------------------------------------------------------------------------------------------------------------------------------- Do not skip meals Do not eat in between meals Reduce carbs- pasta, rice, potatoes, bread Do not drink juices or sodas Donot eat peanut butter Do not eat sugar free cookies and cakes Do not eat peaches, grapes, watermelon, oranges, pineapples,  And raisins

## 2020-06-25 NOTE — PROGRESS NOTES
1. Have you been to the ER, urgent care clinic since your last visit? [facility] June Hospitalized since your last visit? No    2. Have you seen or consulted any other health care providers outside of the [address] [place] [address] since your last visit? Include any pap smears or colon screening.  No    Wt Readings from Last 3 Encounters:   06/25/20 197 lb 9.6 oz (89.6 kg)   12/13/19 194 lb 3.2 oz (88.1 kg)   09/16/19 182 lb 6.4 oz (82.7 kg)     Temp Readings from Last 3 Encounters:   06/25/20 98.5 °F (36.9 °C) (Oral)   12/13/19 97.7 °F (36.5 °C) (Oral)   09/16/19 97.3 °F (36.3 °C)     BP Readings from Last 3 Encounters:   06/25/20 113/66   12/13/19 131/77   09/16/19 112/59     Pulse Readings from Last 3 Encounters:   06/25/20 95   12/13/19 99   09/16/19 87     Lab Results   Component Value Date/Time    Hemoglobin A1c 8.7 (H) 06/19/2020 04:16 PM    Hemoglobin A1c (POC) 6.6 08/31/2018 09:37 AM

## 2020-06-25 NOTE — PROGRESS NOTES
HISTORY OF PRESENT ILLNESS  Dayanara Ford is a 29 y.o. female. HPI  Patient is here for f/u   DM type 2  From dec   2019     Gained 3 lbs   She has been working form home, requesting letter   Forgot the meter/log    Fasting 165 mg   Asking for insulin to be started       Old history :     Gained 12 lbs   Got onto actos 30 mg a day   Not very diet compliant       Old history  :     SHE IS DOING TLC   COMPLIANT WITH MEDS       Old history  :     Gained 2 lbs   She forgot the meter,    Stopped jardiance   Noticing high sugars         Old history :    She could not tolerate bydureon ( lost more weight )  She stopped   She had jardiance  Taken and is tolerating It   She had yeast infection      Old hsitory :  She delivered the baby boy on 2017   Lost 14  lbs   She is off  insulins   She is not breast feeding any more   She is diet compliant   She is on janumet and glipizide         Old history :    She  is referred by Dr. Juan Lamb  H/o DM 2  For 12 years   Usually controlled well until she got pregnant  She says   pcp ( Dr. Jody Hidalgo ) stopped  Medications - janumet xr she said after pregnancy was found out   Since, pt has noted severely high sugars and is very concerned   [place] one    Para 0   0 and she is through 6 weeks of second gestation. LMP : 2017   ELISEO : 2017   Current monitoring regimen: home blood tests - 3 times daily  Home blood sugar records: trend: fluctuating a lot  Any episodes of hypoglycemia? no  Current diabetic medications include got onto toujeo and stopped janmet xr recently .    Eye exam current (within one year): yes  Weight trend: fluctuating a bit  Prior visit with dietician: no  Current diet: \"unhealthy\" diet in general  Current exercise: no regular exercise  Mother is diabetic, on insulin , type not sure         Past Medical History:   Diagnosis Date    Abnormal Papanicolaou smear of cervix     Asthma     uses inhaler as needed    Essential hypertension     Gestational diabetes        Social History     Socioeconomic History    Marital status: SINGLE     Spouse name: Not on file    Number of children: Not on file    Years of education: Not on file    Highest education level: Not on file   Occupational History    Not on file   Social Needs    Financial resource strain: Not on file    Food insecurity     Worry: Not on file     Inability: Not on file    Transportation needs     Medical: Not on file     Non-medical: Not on file   Tobacco Use    Smoking status: Never Smoker    Smokeless tobacco: Never Used   Substance and Sexual Activity    Alcohol use: No    Drug use: No    Sexual activity: Yes     Partners: Male     Birth control/protection: None   Lifestyle    Physical activity     Days per week: Not on file     Minutes per session: Not on file    Stress: Not on file   Relationships    Social connections     Talks on phone: Not on file     Gets together: Not on file     Attends [employer] service: Not on file     Active member of club or organization: Not on file     Attends meetings of clubs or organizations: Not on file     Relationship status: Not on file    Intimate partner violence     Fear of current or ex partner: Not on file     Emotionally abused: Not on file     Physically abused: Not on file     Forced sexual activity: Not on file   Other Topics Concern    Not on file   Social History Narrative    Not on file       Family History   Problem Relation Age of Onset    Diabetes Mother     Diabetes Father     MS Sister              Review of Systems   Constitutional: Negative. HENT: Negative. Eyes: Negative for pain and redness. Respiratory: Negative. Cardiovascular: Negative for chest pain, palpitations and leg swelling. Gastrointestinal: Negative. Negative for constipation. Genitourinary: Negative. Musculoskeletal: Negative for myalgias. Skin: Negative. Neurological: Negative. Endo/Heme/Allergies: Negative. Psychiatric/Behavioral: Negative for depression and memory loss. The patient does not have insomnia. Physical Exam   Constitutional: She is oriented to person, place, and time. She appears well-developed and well-nourished. HENT:   Head: Normocephalic. Eyes: Conjunctivae and EOM are normal. Pupils are equal, round, and reactive to light. Neck: Normal range of motion. Neck supple. No JVD present. No tracheal deviation present. No thyromegaly present. Cardiovascular: Normal rate, regular rhythm and normal heart sounds. No murmur heard. Pulmonary/Chest: Breath sounds normal.   Abdominal: Soft. Bowel sounds are normal.  Musculoskeletal: Normal range of motion. Lymphadenopathy:     She has no cervical adenopathy. Neurological: She is alert and oriented to person, place, and time. She has normal reflexes. Skin: Skin is warm. Psychiatric: She has a normal mood and affect.    Diabetic foot exam: march 2018    Left: Reflexes nd     Vibratory sensation normal    Proprioception nd   Sharp/dull discrimination nd    Filament test normal sensation with micro filament   Pulse DP: 2+ (normal)   Pulse PT: nd   Deformities: None  Right: Reflexes nd   Vibratory sensation normal   Proprioception nd   Sharp/dull discrimination nd   Filament test normal sensation with micro filament   Pulse DP: 2+ (normal)   Pulse PT: nd   Deformities: None      Lab Results   Component Value Date/Time    Hemoglobin A1c 8.7 (H) 06/19/2020 04:16 PM    Hemoglobin A1c 8.0 (H) 12/06/2019 04:27 PM    Hemoglobin A1c 7.8 (H) 09/11/2019 09:26 AM    Glucose 180 (H) 06/19/2020 04:16 PM    Glucose (POC) 116 (H) 11/07/2017 06:00 AM    Glucose  08/31/2018 09:37 AM    Microalbumin/Creat ratio (mg/g creat) 9 12/06/2019 04:27 PM    Microalb/Creat ratio (ug/mg creat.) 5 06/19/2020 04:16 PM    Microalbumin,urine random 1.52 12/06/2019 04:27 PM    LDL, calculated 71 06/19/2020 04:16 PM    Creatinine 0.77 06/19/2020 04:16 PM      Lab Results   Component Value Date/Time    TSH 0.717 10/05/2017 03:09 PM          ASSESSMENT and PLAN    Type 2 diabetes uncontrolled : a1c is  8.7 %      From   June 2020     Compared to    7.8  %    From    August 2019   Compared to    8.3%       From July 2019     Compared to    7.6 %    From   March 2019     Compared to    6.6 %      From    Aug 2018    Compared to  6.5 %    From feb 2018   comapred to   6.3  %       From     Oct 2017     compared to   5.8 %      From     Sept 2017     comapred to   5.7 %     From     July 2017    Compared to    6.5 %         From      June 2017      Compared to    6.4 %      From     April 28 2017 by finger stick    Compared  to  7.9 %   From finger stick on march 8 2017 June 2020   Unstable control   She is eating more, at home   She is wanting to start on insulin    Start on Toujeo once at night , stop glipizide   Start on Starlix tid   She is informed to check sugars but she is always reluctant to do more checks   Suggested watching videos for Angie Mcfarlane and ordered it  Thus far,  on trulicity  And glipizide and actos and janumet xr   Explained to her quite a bit about the insulin actions and how diet control is very important to restrict weight gain      Dec 2019     plateaued  Glycemic control some   She is NOT   DIET COMPLIANT   No meter    On janumet xr , But  jardiance was stopped for yeast infections  SUGGESTED  change  From  trulicity  To OZEMPIC  And pt did not like it because of the needle   STAY ON GLIPIZIDE , ON ACTOS 30 MG  A DAY   Discussed med side effects  Again today   She is educated about possibility of worsening of retinopathy with aggressive glycemic control. 2. htn :   Stopped losartan 50 mg a day , for ANGIOEDEMA   STOPPED METHYLDOPA   ON NORVASC 10 MG A DAY INSTEAD OF m-DOPA        3.  Hyperlipidemia : lipids are normal       Pt requested a letter for extension to work from home longer      > 50 % of time is spent on counseling   Patient voiced understanding her plan of care

## 2020-07-08 RX ORDER — DULAGLUTIDE 1.5 MG/.5ML
INJECTION, SOLUTION SUBCUTANEOUS
Qty: 2 ML | Refills: 6 | Status: SHIPPED | OUTPATIENT
Start: 2020-07-08 | End: 2021-01-14

## 2020-07-08 RX ORDER — PIOGLITAZONEHYDROCHLORIDE 30 MG/1
TABLET ORAL
Qty: 30 TAB | Refills: 6 | Status: SHIPPED | OUTPATIENT
Start: 2020-07-08 | End: 2020-12-15 | Stop reason: SDUPTHER

## 2020-07-08 RX ORDER — PEN NEEDLE, DIABETIC 31 GX3/16"
NEEDLE, DISPOSABLE MISCELLANEOUS
Qty: 100 PEN NEEDLE | Refills: 4 | Status: SHIPPED | OUTPATIENT
Start: 2020-07-08 | End: 2021-09-26

## 2020-07-30 ENCOUNTER — TELEPHONE (OUTPATIENT)
Dept: ENDOCRINOLOGY | Age: 35
End: 2020-07-30

## 2020-07-30 RX ORDER — LANCETS 33 GAUGE
EACH MISCELLANEOUS
Qty: 100 LANCET | Refills: 6 | Status: SHIPPED | OUTPATIENT
Start: 2020-07-30

## 2020-10-09 ENCOUNTER — OFFICE VISIT (OUTPATIENT)
Dept: ENDOCRINOLOGY | Age: 35
End: 2020-10-09
Payer: COMMERCIAL

## 2020-10-09 VITALS
BODY MASS INDEX: 30.19 KG/M2 | WEIGHT: 199.2 LBS | SYSTOLIC BLOOD PRESSURE: 118 MMHG | OXYGEN SATURATION: 99 % | TEMPERATURE: 98.3 F | RESPIRATION RATE: 18 BRPM | HEIGHT: 68 IN | HEART RATE: 101 BPM | DIASTOLIC BLOOD PRESSURE: 77 MMHG

## 2020-10-09 DIAGNOSIS — E78.2 MIXED HYPERLIPIDEMIA: ICD-10-CM

## 2020-10-09 DIAGNOSIS — I10 ESSENTIAL HYPERTENSION: ICD-10-CM

## 2020-10-09 DIAGNOSIS — Z79.4 UNCONTROLLED TYPE 2 DIABETES MELLITUS WITH HYPERGLYCEMIA, WITH LONG-TERM CURRENT USE OF INSULIN (HCC): Primary | ICD-10-CM

## 2020-10-09 DIAGNOSIS — E11.65 UNCONTROLLED TYPE 2 DIABETES MELLITUS WITH HYPERGLYCEMIA, WITH LONG-TERM CURRENT USE OF INSULIN (HCC): Primary | ICD-10-CM

## 2020-10-09 PROCEDURE — 3051F HG A1C>EQUAL 7.0%<8.0%: CPT | Performed by: INTERNAL MEDICINE

## 2020-10-09 PROCEDURE — 99214 OFFICE O/P EST MOD 30 MIN: CPT | Performed by: INTERNAL MEDICINE

## 2020-10-09 RX ORDER — LORATADINE 10 MG/1
10 TABLET ORAL
COMMUNITY
End: 2022-04-25 | Stop reason: ALTCHOICE

## 2020-10-09 NOTE — PATIENT INSTRUCTIONS
-------------------------------------------------------------------------------------------- Refills    -    please call your pharmacy and have them send us a refill request 
 
Results  -  allow up to a week for lab results to be processed and reviewed. Phone calls  -  Allow upto 24 hrs. for non-urgent calls to be retained Prior authorization - It may take up to 2 weeks to process, depending on your insurance Forms  -  FMLA, DMV, patient assistance, etc. will take up to 2 weeks to process Cancellations - please notify the office in advance if you cannot keep your appointment Samples  - will only be dispensed at visits as supply is limited If you are having a medical emergency call 911 
 
---------------------------------------------------------------------------------[employer]  
dexcom  ( cost ) Joanna  ( cheap and easy to use )  
 
 
--------------------------------------------------------------------- 
 
 NORVASC 10 MG A DAY pioglitazone  30 mg a day  
 
 
 janumet  xr  50/1000 mg  One pill twice a day with meals   
 
 
  trulicity 1.5 mg a week Toujeo/ tresiba   30  Units   At night Stay on starlix  120 mg right before each meal  ( 3 times a day )  
 
 
-------------------------------------------------------------------------------- Check blood sugars only twice a day by rotation as follows First day - before b-fast and - before lunch Second day- before dinner and  before bed time After 2 days go back to same rotation 
 
 
 
-------------------------------------------------------------------------------------------------------------------------------------------------------------------------- Do not skip meals Do not eat in between meals Reduce carbs- pasta, rice, potatoes, bread Do not drink juices or sodas Donot eat peanut butter Do not eat sugar free cookies and cakes Do not eat peaches, grapes, watermelon, oranges, pineapples,  And raisins

## 2020-10-09 NOTE — PROGRESS NOTES
1. Have you been to the ER, urgent care clinic since your last visit? no Hospitalized since your last visit?no    2. Have you seen or consulted any other health care providers outside of the [address] [place] [address] since your last visit? Include any pap smears or colon screening.  No     Wt Readings from Last 3 Encounters:   10/09/20 199 lb 3.2 oz (90.4 kg)   06/25/20 197 lb 9.6 oz (89.6 kg)   12/13/19 194 lb 3.2 oz (88.1 kg)     Temp Readings from Last 3 Encounters:   10/09/20 98.3 °F (36.8 °C) (Oral)   06/25/20 98.5 °F (36.9 °C) (Oral)   12/13/19 97.7 °F (36.5 °C) (Oral)     BP Readings from Last 3 Encounters:   10/09/20 118/77   06/25/20 113/66   12/13/19 131/77     Pulse Readings from Last 3 Encounters:   10/09/20 (!) 101   06/25/20 95   12/13/19 99

## 2020-10-09 NOTE — PROGRESS NOTES
HISTORY OF PRESENT ILLNESS  Jesus Manuel Sanches is a 28 y.o. female. HPI  Patient is here for f/u   DM type 2  From 2020      Taking insulin , fastings are 160 -170 mg   She eats cereal at bed time   Did not get the mter  Got a folder of forms to be filledout           2020    Gained 3 lbs   She has been working form home, requesting letter   Forgot the meter/log    Fasting 165 mg   Asking for insulin to be started       Old hsitory :  She delivered the baby boy on 2017   Lost 14  lbs   She is off  insulins   She is not breast feeding any more   She is diet compliant   She is on janumet and glipizide         Old history :    She  is referred by Dr. Swetha Lemon  H/o DM 2  For 12 years   Usually controlled well until she got pregnant  She says   pcp ( Dr. Felix Mckeon ) stopped  Medications - janumet xr she said after pregnancy was found out   Since, pt has noted severely high sugars and is very concerned   [place] one    Para 0   0 and she is through 6 weeks of second gestation. LMP : 2017   ELISEO : 2017   Current monitoring regimen: home blood tests - 3 times daily  Home blood sugar records: trend: fluctuating a lot  Any episodes of hypoglycemia? no  Current diabetic medications include got onto toujeo and stopped janmet xr recently .    Eye exam current (within one year): yes  Weight trend: fluctuating a bit  Prior visit with dietician: no  Current diet: \"unhealthy\" diet in general  Current exercise: no regular exercise  Mother is diabetic, on insulin , type not sure         Past Medical History:   Diagnosis Date    Abnormal Papanicolaou smear of cervix     Asthma     uses inhaler as needed    Essential hypertension     Gestational diabetes        Social History     Socioeconomic History    Marital status: SINGLE     Spouse name: Not on file    Number of children: Not on file    Years of education: Not on file    Highest education level: Not on file   Occupational History    Not on file   Social Needs    Financial resource strain: Not on file    Food insecurity     Worry: Not on file     Inability: Not on file    Transportation needs     Medical: Not on file     Non-medical: Not on file   Tobacco Use    Smoking status: Never Smoker    Smokeless tobacco: Never Used   Substance and Sexual Activity    Alcohol use: No    Drug use: No    Sexual activity: Yes     Partners: Male     Birth control/protection: None   Lifestyle    Physical activity     Days per week: Not on file     Minutes per session: Not on file    Stress: Not on file   Relationships    Social connections     Talks on phone: Not on file     Gets together: Not on file     Attends Jehovah's witness service: Not on file     Active member of club or organization: Not on file     Attends meetings of clubs or organizations: Not on file     Relationship status: Not on file    Intimate partner violence     Fear of current or ex partner: Not on file     Emotionally abused: Not on file     Physically abused: Not on file     Forced sexual activity: Not on file   Other Topics Concern    Not on file   Social History Narrative    Not on file       Family History   Problem Relation Age of Onset    Diabetes Mother     Diabetes Father     MS Sister              Review of Systems   Constitutional: Negative. HENT: Negative. Eyes: Negative for pain and redness. Respiratory: Negative. Cardiovascular: Negative for chest pain, palpitations and leg swelling. Gastrointestinal: Negative. Negative for constipation. Genitourinary: Negative. Musculoskeletal: Negative for myalgias. Skin: Negative. Neurological: Negative. Endo/Heme/Allergies: Negative. Psychiatric/Behavioral: Negative for depression and memory loss. The patient does not have insomnia. Physical Exam   Constitutional: She is oriented to person, place, and time. She appears well-developed and well-nourished. HENT:   Head: Normocephalic.    Eyes: Conjunctivae and EOM are normal. Pupils are equal, round, and reactive to light. Neck: Normal range of motion. Neck supple. No JVD present. No tracheal deviation present. No thyromegaly present. Cardiovascular: Normal rate, regular rhythm and normal heart sounds. No murmur heard. Pulmonary/Chest: Breath sounds normal.   Abdominal: Soft. Bowel sounds are normal.  Musculoskeletal: Normal range of motion. Lymphadenopathy:     She has no cervical adenopathy. Neurological: She is alert and oriented to person, place, and time. She has normal reflexes. Skin: Skin is warm. Psychiatric: She has a normal mood and affect.        Lab Results   Component Value Date/Time    Hemoglobin A1c 7.5 (H) 10/02/2020 04:27 PM    Hemoglobin A1c 8.7 (H) 06/19/2020 04:16 PM    Hemoglobin A1c 8.0 (H) 12/06/2019 04:27 PM    Glucose 89 10/02/2020 04:27 PM    Glucose (POC) 116 (H) 11/07/2017 06:00 AM    Glucose  08/31/2018 09:37 AM    Microalbumin/Creat ratio (mg/g creat) 14 10/02/2020 04:27 PM    Microalbumin,urine random 3.94 10/02/2020 04:27 PM    LDL, calculated 54.4 10/02/2020 04:27 PM    Creatinine 0.64 10/02/2020 04:27 PM      Lab Results   Component Value Date/Time    TSH 0.717 10/05/2017 03:09 PM          ASSESSMENT and PLAN    Type 2 diabetes uncontrolled : a1c is  7.5 %     From oct 2020     Compared to   8.7 %      From   June 2020     Compared to    7.8  %    From    August 2019   Compared to    8.3%       From July 2019     Compared to    7.6 %    From   March 2019     Compared to    6.6 %      From    Aug 2018    Compared to  6.5 %    From feb 2018   comapred to   6.3  %       From     Oct 2017     compared to   5.8 %      From     Sept 2017      Oct 2020   Improved control   But asking her to consume less calories and carbs     No meter for review   Asking her to stop cereal at night times   She is to continue on toujeo, starlix and actos  And trulicity  And janumet xr       June 2020   Unstable control She is eating more, at home   She is wanting to start on insulin    Start on Toujeo once at night , stop glipizide   Start on Starlix tid   She is informed to check sugars but she is always reluctant to do more checks   Suggested watching videos for Honey Blanka and ordered it  Thus far,  on trulicity  And glipizide and actos and janumet xr   Explained to her quite a bit about the insulin actions and how diet control is very important to restrict weight gain      Dec 2019     plateaued  Glycemic control some   She is NOT   DIET COMPLIANT   No meter    On janumet xr , But  jardiance was stopped for yeast infections  SUGGESTED  change  From  trulicity  To OZEMPIC  And pt did not like it because of the needle   STAY ON GLIPIZIDE , ON ACTOS 30 MG  A DAY   Discussed med side effects  Again today   She is educated about possibility of worsening of retinopathy with aggressive glycemic control. 2. htn :   Stopped losartan 50 mg a day , for ANGIOEDEMA   STOPPED METHYLDOPA   ON NORVASC 10 MG A DAY INSTEAD OF m-DOPA        3.  Hyperlipidemia : lipids are normal       Pt is asking to fill forms      > 50 % of time is spent on counseling   Patient voiced understanding her plan of care

## 2020-12-15 RX ORDER — PIOGLITAZONEHYDROCHLORIDE 30 MG/1
TABLET ORAL
Qty: 30 TAB | Refills: 6 | Status: SHIPPED | OUTPATIENT
Start: 2020-12-15 | End: 2021-06-14 | Stop reason: SDUPTHER

## 2021-01-11 RX ORDER — NATEGLINIDE 120 MG/1
TABLET ORAL
Qty: 90 TAB | Refills: 6 | Status: SHIPPED | OUTPATIENT
Start: 2021-01-11 | End: 2021-06-14 | Stop reason: SDUPTHER

## 2021-01-14 RX ORDER — DULAGLUTIDE 1.5 MG/.5ML
INJECTION, SOLUTION SUBCUTANEOUS
Qty: 2 ML | Refills: 6 | Status: SHIPPED | OUTPATIENT
Start: 2021-01-14 | End: 2021-06-14 | Stop reason: SDUPTHER

## 2021-03-03 RX ORDER — AMLODIPINE BESYLATE 10 MG/1
TABLET ORAL
Qty: 30 TAB | Refills: 6 | Status: SHIPPED | OUTPATIENT
Start: 2021-03-03 | End: 2021-06-14 | Stop reason: SDUPTHER

## 2021-03-12 RX ORDER — SITAGLIPTIN AND METFORMIN HYDROCHLORIDE 50; 1000 MG/1; MG/1
TABLET, FILM COATED, EXTENDED RELEASE ORAL
Qty: 60 TAB | Refills: 6 | Status: SHIPPED | OUTPATIENT
Start: 2021-03-12 | End: 2021-06-14 | Stop reason: SDUPTHER

## 2021-05-23 RX ORDER — INSULIN GLARGINE 300 U/ML
INJECTION, SOLUTION SUBCUTANEOUS
Qty: 3 ML | Refills: 4 | Status: SHIPPED | OUTPATIENT
Start: 2021-05-23 | End: 2021-12-10

## 2021-06-10 DIAGNOSIS — E78.2 MIXED HYPERLIPIDEMIA: ICD-10-CM

## 2021-06-10 DIAGNOSIS — E11.65 UNCONTROLLED TYPE 2 DIABETES MELLITUS WITH HYPERGLYCEMIA, WITH LONG-TERM CURRENT USE OF INSULIN (HCC): Primary | ICD-10-CM

## 2021-06-10 DIAGNOSIS — Z79.4 UNCONTROLLED TYPE 2 DIABETES MELLITUS WITH HYPERGLYCEMIA, WITH LONG-TERM CURRENT USE OF INSULIN (HCC): Primary | ICD-10-CM

## 2021-06-10 DIAGNOSIS — I10 ESSENTIAL HYPERTENSION: ICD-10-CM

## 2021-06-14 ENCOUNTER — OFFICE VISIT (OUTPATIENT)
Dept: ENDOCRINOLOGY | Age: 36
End: 2021-06-14
Payer: COMMERCIAL

## 2021-06-14 ENCOUNTER — DOCUMENTATION ONLY (OUTPATIENT)
Dept: ENDOCRINOLOGY | Age: 36
End: 2021-06-14

## 2021-06-14 VITALS
HEART RATE: 95 BPM | DIASTOLIC BLOOD PRESSURE: 71 MMHG | OXYGEN SATURATION: 99 % | WEIGHT: 198 LBS | TEMPERATURE: 98.5 F | SYSTOLIC BLOOD PRESSURE: 124 MMHG | RESPIRATION RATE: 16 BRPM | BODY MASS INDEX: 30.01 KG/M2 | HEIGHT: 68 IN

## 2021-06-14 DIAGNOSIS — I10 ESSENTIAL HYPERTENSION: ICD-10-CM

## 2021-06-14 DIAGNOSIS — E78.2 MIXED HYPERLIPIDEMIA: ICD-10-CM

## 2021-06-14 DIAGNOSIS — E11.65 UNCONTROLLED TYPE 2 DIABETES MELLITUS WITH HYPERGLYCEMIA, WITH LONG-TERM CURRENT USE OF INSULIN (HCC): ICD-10-CM

## 2021-06-14 DIAGNOSIS — Z79.4 UNCONTROLLED TYPE 2 DIABETES MELLITUS WITH HYPERGLYCEMIA, WITH LONG-TERM CURRENT USE OF INSULIN (HCC): ICD-10-CM

## 2021-06-14 PROCEDURE — 99214 OFFICE O/P EST MOD 30 MIN: CPT | Performed by: INTERNAL MEDICINE

## 2021-06-14 RX ORDER — PIOGLITAZONEHYDROCHLORIDE 30 MG/1
TABLET ORAL
Qty: 30 TABLET | Refills: 6 | Status: SHIPPED | OUTPATIENT
Start: 2021-06-14 | End: 2022-03-03

## 2021-06-14 RX ORDER — AMLODIPINE BESYLATE 10 MG/1
TABLET ORAL
Qty: 30 TABLET | Refills: 6 | Status: SHIPPED | OUTPATIENT
Start: 2021-06-14 | End: 2022-02-01

## 2021-06-14 RX ORDER — NATEGLINIDE 120 MG/1
TABLET ORAL
Qty: 90 TABLET | Refills: 6 | Status: SHIPPED | OUTPATIENT
Start: 2021-06-14 | End: 2022-03-29

## 2021-06-14 RX ORDER — CETIRIZINE HCL 10 MG
10 TABLET ORAL DAILY
COMMUNITY
End: 2022-10-12 | Stop reason: SDUPTHER

## 2021-06-14 RX ORDER — DULAGLUTIDE 1.5 MG/.5ML
INJECTION, SOLUTION SUBCUTANEOUS
Qty: 2 ML | Refills: 6 | Status: SHIPPED | OUTPATIENT
Start: 2021-06-14 | End: 2022-04-25 | Stop reason: SDUPTHER

## 2021-06-14 RX ORDER — SITAGLIPTIN AND METFORMIN HYDROCHLORIDE 50; 1000 MG/1; MG/1
TABLET, FILM COATED, EXTENDED RELEASE ORAL
Qty: 60 TABLET | Refills: 6 | Status: SHIPPED | OUTPATIENT
Start: 2021-06-14 | End: 2022-03-03

## 2021-06-14 NOTE — PROGRESS NOTES
HISTORY OF PRESENT ILLNESS  Michelle Abbasi is a 28 y.o. female. HPI  Patient is here for f/u   DM type 2  From  2020        She had depression issues   She is out of job   She is not drinking juices and drinking lot of water   She lost her  b-in- law   And that hurt her a lot     Lost 1 lb           2020      Taking insulin , fastings are 160 -170 mg   She eats cereal at bed time   Did not get the [address] a folder of forms to be filledout           2020    Gained 3 lbs   She has been working form home, requesting letter   Forgot the meter/log    Fasting 165 mg   Asking for insulin to be started         Old history :    She  is referred by Dr. Dayne Damon  H/o DM 2  For 12 years   Usually controlled well until she got pregnant  She says   pcp ( Dr. Manuel Ellis ) stopped  Medications - janumet xr she said after pregnancy was found out   Since, pt has noted severely high sugars and is very concerned   [place] one    Para 0   0 and she is through 6 weeks of second gestation. LMP : 2017   ELISEO : 2017   Current monitoring regimen: home blood tests - 3 times daily        Review of Systems   Constitutional: Negative. Psychiatric/Behavioral: positive for depression , has insomnia. Physical Exam   Constitutional: She is oriented to person, place, and time. She appears well-developed and well-nourished. Psychiatric: She has a normal mood and affect.        Lab Results   Component Value Date/Time    Hemoglobin A1c 6.7 (H) 06/10/2021 10:43 AM    Hemoglobin A1c 7.5 (H) 10/02/2020 04:27 PM    Hemoglobin A1c 8.7 (H) 2020 04:16 PM    Glucose 141 (H) 06/10/2021 10:43 AM    Glucose (POC) 116 (H) 2017 06:00 AM    Glucose  2018 09:37 AM    Microalbumin/Creat ratio (mg/g creat) 14 10/02/2020 04:27 PM    Microalbumin,urine random 3.94 10/02/2020 04:27 PM    LDL, calculated 79.4 06/10/2021 10:43 AM    Creatinine 0.69 06/10/2021 10:43 AM      Lab Results   Component Value Date/Time    TSH 0.717 10/05/2017 03:09 PM          ASSESSMENT and PLAN    Type 2 diabetes uncontrolled : a1c is    6.7 %      From June 2021   Compared to   7.5 %     From oct 2020     Compared to   8.7 %      From   June 2020     Compared to    7.8  %    From    August 2019   Compared to    8.3%       From July 2019     Compared to    7.6 %    From   March 2019     Compared to    6.6 %      From    Aug 2018    Compared to  6.5 %    From feb 2018   comapred to   6.3  %       From     Oct 2017     compared to   5.8 %      From     Sept 2017 june 2021   Improved glycemic control    She is to continue on toujeo  30 units at night    starlix 120 mg tid  and actos  30 mg a day   And trulicity  And janumet xr   She is encouraged to check sugars ( meter had no  Readings )      Oct 2020   Improved control   But asking her to consume less calories and carbs   No meter for review   Asking her to stop cereal at night times   She is to continue on toujeo, starlix and actos  And trulicity  And janumet xr       June 2020   Unstable control   She is eating more, at home   She is wanting to start on insulin    Start on Toujeo once at night , stop glipizide   Start on Starlix tid   She is informed to check sugars but she is always reluctant to do more checks   Suggested watching videos for [facility] and ordered it  Thus far,  on trulicity  And glipizide and actos and janumet xr   Explained to her quite a bit about the insulin actions and how diet control is very important to restrict weight gain      2. htn : On norvasc   Stopped losartan 50 mg a day , for ANGIOEDEMA   STOPPED METHYLDOPA       3.  Hyperlipidemia : lipids are normal         Reviewed results with patient and discussed the labs being ordered today/bnv  Patient voiced understanding of plan of care

## 2021-06-14 NOTE — PROGRESS NOTES
Suha Moura is a 28 y.o. female here for   Chief Complaint   Patient presents with    Diabetes       1. Have you been to the ER, urgent care clinic since your last visit? Hospitalized since your last visit? -no    2. Have you seen or consulted any other health care providers outside of the [address] [place] [address] since your last visit?   Include any pap smears or colon screening.-no

## 2021-06-14 NOTE — PATIENT INSTRUCTIONS
SPECIFIC INSTRUCTIONS BELOW      NORVASC 10 MG A DAY     pioglitazone  30 mg a day        janumet  xr  50/1000 mg  One pill twice a day with meals          trulicity 1.5 mg a week      Toujeo/ tresiba   30  Units   At night     Stay on starlix  120 mg right before each meal  ( 3 times a day )         -------------PAY ATTENTION TO THESE GENERAL INSTRUCTIONS -----------------    -ANY tests other than blood work, which you opt to do  outside the  Critical access hospital imaging facilities, you are responsible for prior authorizations if  required    - HEALTH MAINTENANCE IS NOT GOING TO BE UP TO DATE ON YOUR AVS- PLEASE IGNORE   - YOUR MED LIST IS NOT UP TO DATE AS SOME CHANGES ARE BEING MADE AFTER THE VISIT - FOLLOW SPECIFIC INSTRUCTIONS  ABOVE     Results     *Normal results will not be notified by a phone call starting January 1 2021   *If you have an upcoming visit, the results will be discussed at the visit   *Please sign up for MY CHART if you want access to your lab and test results  *Abnormal results which require immediate attention will be notified by phone call   *Abnormal results which do not require immediate assistance will be notified in 1-2 weeks       Refills    -    have your pharmacy send us a refill request . Refills are done max for one year and a visit is a must before refills are extended    Follow up appointments -  highly encourage you to make it when you are checking out. We can accommodate you into the schedule based on your clinical situation, but not for extending refills beyond a year. Labs are important to give refills and is important to get labs before the visit     Phone calls  -  Allow  24 hrs.  for non-urgent calls to be returned  Prior authorization - It may take 2-4 weeks to process  Forms  -  FMLA, DMV etc., will take up to 2 weeks to process  Cancellations - please notify the office 2 days in advance   Samples  - will only be dispensed at visits       If not showing for the appointments and cancelling appointments within 24 hours are kept track of and three  of such situations in  two consecutive years will likely be considered for termination from the practice    -------------------------------------------------------------------------------------------------------------------

## 2021-06-15 LAB
CREAT UR-MCNC: 205 MG/DL
MICROALBUMIN UR-MCNC: 2.5 MG/DL
MICROALBUMIN/CREAT UR-RTO: 12 MG/G (ref 0–30)

## 2021-08-10 ENCOUNTER — OFFICE VISIT (OUTPATIENT)
Dept: FAMILY MEDICINE CLINIC | Age: 36
End: 2021-08-10
Payer: COMMERCIAL

## 2021-08-10 VITALS
HEIGHT: 68 IN | TEMPERATURE: 98.4 F | WEIGHT: 200.4 LBS | SYSTOLIC BLOOD PRESSURE: 120 MMHG | DIASTOLIC BLOOD PRESSURE: 70 MMHG | BODY MASS INDEX: 30.37 KG/M2 | OXYGEN SATURATION: 97 % | HEART RATE: 97 BPM

## 2021-08-10 DIAGNOSIS — E11.9 CONTROLLED TYPE 2 DIABETES MELLITUS WITHOUT COMPLICATION, WITH LONG-TERM CURRENT USE OF INSULIN (HCC): ICD-10-CM

## 2021-08-10 DIAGNOSIS — Z79.4 CONTROLLED TYPE 2 DIABETES MELLITUS WITHOUT COMPLICATION, WITH LONG-TERM CURRENT USE OF INSULIN (HCC): ICD-10-CM

## 2021-08-10 DIAGNOSIS — Z76.89 ENCOUNTER TO ESTABLISH CARE: Primary | ICD-10-CM

## 2021-08-10 DIAGNOSIS — F51.04 PSYCHOPHYSIOLOGICAL INSOMNIA: ICD-10-CM

## 2021-08-10 DIAGNOSIS — L68.0 HIRSUTISM: ICD-10-CM

## 2021-08-10 PROCEDURE — 99204 OFFICE O/P NEW MOD 45 MIN: CPT | Performed by: STUDENT IN AN ORGANIZED HEALTH CARE EDUCATION/TRAINING PROGRAM

## 2021-08-10 NOTE — PROGRESS NOTES
Subjective:     Chief Complaint   Patient presents with    [facility]    Diabetes     HPI:  Elvin Ferris is a 39 y.o. female who is here to establish care. Has not had PCP in about 4 years. Stopped seeing PCP (Dr Robbie Ramos) due to issues with treatment of diabetes while pregnant. Patient has history of diabetes and follows with endocrinology. Most recent A1c is 6.7. She is on multiple medications as stated in the medication list.  She was first diagnosed with diabetes  when she was 24years old. Shows diabetes type 2 in the EMR. History of HTN she is on amlodipine 10 mg daily. Blood pressure is well controlled today, and has been well controlled during previous visit to see her endocrinologist.  Does not check blood pressure at home. Patient has facial hair on her cheeks, and in the submandibular area. She would like to see a dermatologist.  She waxes the areas and get scarring. Patient has trouble falling asleep. She uses Benadryl which helps. Dub [place] in the past which did not help much. Has not tried melatonin. She does see a therapist.  She is currently on medical leave due to stress from her job. She works as a prison gaurd. Her brother-in-law  couple a months ago due to Covid. He has only 52years old. This has affected her severely.     Past Medical History:   Diagnosis Date    Abnormal Papanicolaou smear of cervix     Asthma     uses inhaler as needed    Essential hypertension     Gestational diabetes      Family History   Problem Relation Age of Onset    Diabetes Mother     Hypertension Mother     Diabetes Father     Arrhythmia Father     Hypertension Father     MS Sister      Social History     Socioeconomic History    Marital status: SINGLE     Spouse name: Not on file    Number of children: Not on file    Years of education: Not on file    Highest education level: Not on file   Occupational History    Not on file   Tobacco Use    Smoking status: Never Smoker    Smokeless tobacco: Never Used   Vaping Use    Vaping Use: Never used   Substance and Sexual Activity    Alcohol use: No    Drug use: No    Sexual activity: Yes     Partners: Male     Birth control/protection: None   Other Topics Concern    Not on file   Social History Narrative    Not on file     Social Determinants of Health     Financial Resource Strain:     Difficulty of Paying Living Expenses:    Food Insecurity:     Worried About Running Out of Food in the Last Year:     [address] in the Last Year:    Transportation Needs:     Lack of Transportation (Medical):  Lack of Transportation (Non-Medical):    Physical Activity:     Days of Exercise per Week:     Minutes of Exercise per Session:    Stress:     Feeling of Stress :    Social Connections:     Frequency of Communication with Friends and Family:     Frequency of Social Gatherings with Friends and Family:     Attends Jain Services:     Active Member of Clubs or Organizations:     Attends Club or Organization Meetings:     Marital Status:    Intimate Partner Violence:     Fear of Current or Ex-Partner:     Emotionally Abused:     Physically Abused:     Sexually Abused:      Current Outpatient Medications on File Prior to Visit   Medication Sig Dispense Refill    pioglitazone (ACTOS) 30 mg tablet TAKE 1 TABLET BY MOUTH DAILY.  STOP 15 MG DOSE 30 Tablet 6    nateglinide (STARLIX) 120 mg tablet TAKE 1 TABLET before meals 90 Tablet 6    dulaglutide (Trulicity) 1.5 CR/1.2 mL sub-q pen INJECT 1.5 MG UNDER THE SKIN EVERY 7 DAYS, STOP 0.75 MG DOSE 2 mL 6    amLODIPine (NORVASC) 10 mg tablet TAKE 1 TABLET BY MOUTH DAILY 30 Tablet 6    SITagliptin-metFORMIN (Janumet XR) 50-1,000 mg TM24 One pill twice a day with meals 60 Tablet 6    Toujeo SoloStar U-300 Insulin 300 unit/mL (1.5 mL) inpn pen ADMINISTER 30 UNITS UNDER THE SKIN EVERY NIGHT 3 mL 4    lancets (One Touch Delica) 33 gauge misc Test 2 times daily. Dx code E11.65 100 Lancet 6    Insulin Needles, Disposable, 32 gauge x 5/32\" ndle Use to inject insulin once daily. Dx. Code E11.65 100 Pen Needle 4    JUNEL FE 24 1 mg-20 mcg (24)/75 mg (4) tab TK 1 T PO QD  7    sertraline (ZOLOFT) 50 mg tablet   1    ONETOUCH ULTRA TEST strip USE TO TEST BLOOD SUGAR LEVELS 4 TO 6 TIMES PER  Strip 6    prenatal multivit-ca-min-fe-fa tab Take  by mouth.  cetirizine (ZyrTEC) 10 mg tablet Take 10 mg by mouth daily as needed for Allergies. (Patient not taking: Reported on 8/10/2021)      loratadine (Claritin) 10 mg tablet Take 10 mg by mouth. (Patient not taking: Reported on 6/14/2021)       No current facility-administered medications on file prior to visit. Allergies   Allergen Reactions    Lisinopril Swelling    Losartan Swelling     Angioedema     ROS      Objective:     Vitals:    08/10/21 1413   BP: 120/70   Pulse: 97   Temp: 98.4 °F (36.9 °C)   TempSrc: Temporal   SpO2: 97%   Weight: 200 lb 6.4 oz (90.9 kg)   Height: 5' 7.5\" (1.715 m)     Physical Exam  Vitals reviewed. Constitutional:       Appearance: Normal appearance. HENT:      Head: Normocephalic and atraumatic. Cardiovascular:      Rate and Rhythm: Normal rate and regular rhythm. Pulmonary:      Effort: Pulmonary effort is normal.      Breath sounds: Normal breath sounds. Skin:     Comments: Facial hair noted on the right cheek and submandibular area. Scarring noted in left cheek and submandibular area. Neurological:      Mental Status: She is alert and oriented to person, place, and time. Psychiatric:         Behavior: Behavior normal.            Assessment/Plan:         1. Encounter to establish care  -Discussed past medical history. No labs done. She recently had labs done by endocrinology, and were reviewed. Labs unremarkable. 2. Hirsutism        -     History of hirsutism for many years. We will have her see dermatology.   Pending what her dermatology does will check hormones. (PCOS? )  -     REFERRAL TO DERMATOLOGY    3. Controlled type 2 diabetes mellitus without complication, with long-term current use of insulin ([address]  -Diabetes well controlled with A1c of 6.7. [facility] endocrinology. 4. Psychophysiological insomnia  -Advised to try melatonin. If melatonin does not work and try trazodone. There is definite underlying psychological component due to anxiety/stress, possible depression. Currently on Zoloft and following with psychology. Follow-up and Dispositions    · Return in about 1 month (around 9/10/2021) for Insomnia.             Veto Molina MD

## 2021-08-10 NOTE — PROGRESS NOTES
Chief Complaint   Patient presents with    Establish Care    Diabetes     1. Have you been to the ER, urgent care clinic since your last visit? Hospitalized since your last visit? No    2. Have you seen or consulted any other health care providers outside of the [address] [place] [address] since your last visit? Include any pap smears or colon screening. Previous provider.

## 2021-08-10 NOTE — PATIENT INSTRUCTIONS
Please try 3 mg tablet melatonin. Start with 3 mg nightly which is 1 tablet for couple days if needed you can try 6 mg which is 2 tablets, and lastly if you continue having trouble sleeping can try 9 mg which is 3 tablets. Melatonin (By mouth)   Melatonin (julien-a-TOE-niranjan)  Treats insomnia. Brand Name(s): Advanced Sleep Melatonin, Basic's Melatonin, Finest Nutrition Melatonin, [facility] Melatonin, Nature's Blend Melatonin, Optimum Melatonin, PharmAssure Melatonin, [employer] Melatonin, Sundown Naturals Melatonin   There may be other brand names for this medicine. When This Medicine Should Not Be Used: You should not use this medicine if you have had an allergic reaction to melatonin. How to Use This Medicine:   Capsule, Long Acting Capsule, Liquid, Tablet, Long Acting Tablet  · Your doctor will tell you how much medicine to use. Do not use more than directed. · Follow the instructions on the medicine label if you are using this medicine without a prescription. · Take your dose 20 minutes before your bedtime. You may take this medicine with or without food. · The liquid may be taken directly or combined with water or juice. If a dose is missed:   · If you miss a dose or forget to use your medicine, call your doctor or pharmacist for instructions. How to Store and Dispose of This Medicine:   · Store the medicine in a closed container at room temperature, away from heat, moisture, and direct light. · Keep all medicine out of the reach of children. Never share your medicine with anyone. · Ask your pharmacist, doctor, or health caregiver about the best way to dispose of any outdated medicine or medicine no longer needed. Drugs and Foods to Avoid:   Ask your doctor or pharmacist before using any other medicine, including over-the-counter medicines, vitamins, and herbal products.   · Make sure your doctor knows if you are also using any tranquilizer medicines, or if you are also using any sedative medicines. Warnings While Using This Medicine:   · Make sure your doctor knows if you are pregnant or breast feeding, or if you have an autoimmune condition. Make sure your doctor knows if you are feeling sad or depressed. · This medicine may make you drowsy. Avoid driving, using machines, or doing anything else that might be dangerous if you are not alert. Possible Side Effects While Using This Medicine: If you notice these less serious side effects, talk with your doctor:  · Feeling sluggish or tired in the morning. · Headache. If you notice other side effects that you think are caused by this medicine, tell your doctor. Call your doctor for medical advice about side effects. You may report side effects to FDA at 6-826-KUC-2539  © 2017 [facility] Information is for End User's use only and may not be sold, redistributed or otherwise used for commercial purposes. The above information is an  only. It is not intended as medical advice for individual conditions or treatments. Talk to your doctor, nurse or pharmacist before following any medical regimen to see if it is safe and effective for you. Insomnia: Care Instructions  Your Care Instructions     Insomnia is the inability to sleep well. It is a common problem for most people at some time. Insomnia may make it hard for you to get to sleep, stay asleep, or sleep as long as you need to. This can make you tired and grouchy during the day. It can also make you forgetful, less effective at work, and unhappy. Insomnia can be caused by conditions such as depression or anxiety. Pain can also affect your ability to sleep. When these problems are solved, the insomnia usually clears up. But sometimes bad sleep habits can cause insomnia. If insomnia is affecting your work or your enjoyment of life, you can take steps to improve your sleep. Follow-up care is a key part of your treatment and safety.  Be sure to make and go to all appointments, and call your doctor if you are having problems. It's also a good idea to know your test results and keep a list of the medicines you take. How can you care for yourself at home? What to avoid   · Do not have drinks with caffeine, such as coffee or black tea, for 8 hours before bed. · Do not smoke or use other types of tobacco near bedtime. Nicotine is a stimulant and can keep you awake. · Avoid drinking alcohol late in the evening, because it can cause you to wake in the middle of the night. · Do not eat a big meal close to bedtime. If you are hungry, eat a light snack. · Do not drink a lot of water close to bedtime, because the need to urinate may wake you up during the night. · Do not read or watch TV in bed. Use the bed only for sleeping and sexual activity. What to try   · Go to bed at the same time every night, and wake up at the same time every morning. Do not take naps during the day. · Keep your bedroom quiet, dark, and cool. · Sleep on a comfortable pillow and mattress. · If watching the clock makes you anxious, turn it facing away from you so you cannot see the time. · If you worry when you lie down, start a worry book. Well before bedtime, write down your worries, and then set the book and your concerns aside. · Try meditation or other relaxation techniques before you go to bed. · If you cannot fall asleep, get up and go to another room until you feel sleepy. Do something relaxing. Repeat your bedtime routine before you go to bed again. · Make your house quiet and calm about an hour before bedtime. Turn down the lights, turn off the TV, log off the computer, and turn down the volume on music. This can help you relax after a busy day. When should you call for help?   Watch closely for changes in your health, and be sure to contact your doctor if:    · Your efforts to improve your sleep do not work.     · Your insomnia gets worse.     · You have been feeling down, depressed, or hopeless or have lost interest in things that you usually enjoy. Where can you learn more? Go to http://www.gray.com/  Enter P513 in the search box to learn more about \"Insomnia: Care Instructions. \"  Current as of: August 31, 2020               Content Version: 12.8  © 5074-2750 Healthwise, "[employer]. Care instructions adapted under license by [employer] (which disclaims liability or warranty for this information). If you have questions about a medical condition or this instruction, always ask your healthcare professional. [address] any warranty or liability for your use of this information.

## 2021-09-26 RX ORDER — PEN NEEDLE, DIABETIC 32GX 5/32"
NEEDLE, DISPOSABLE MISCELLANEOUS
Qty: 100 PEN NEEDLE | Refills: 5 | Status: SHIPPED | OUTPATIENT
Start: 2021-09-26 | End: 2022-09-27

## 2021-12-10 RX ORDER — INSULIN GLARGINE 300 U/ML
INJECTION, SOLUTION SUBCUTANEOUS
Qty: 3 ML | Refills: 4 | Status: SHIPPED | OUTPATIENT
Start: 2021-12-10 | End: 2022-04-25 | Stop reason: SDUPTHER

## 2022-02-01 RX ORDER — AMLODIPINE BESYLATE 10 MG/1
TABLET ORAL
Qty: 30 TABLET | Refills: 6 | Status: SHIPPED | OUTPATIENT
Start: 2022-02-01 | End: 2022-04-25 | Stop reason: SDUPTHER

## 2022-03-03 RX ORDER — SITAGLIPTIN AND METFORMIN HYDROCHLORIDE 50; 1000 MG/1; MG/1
TABLET, FILM COATED, EXTENDED RELEASE ORAL
Qty: 60 TABLET | Refills: 6 | Status: SHIPPED | OUTPATIENT
Start: 2022-03-03 | End: 2022-04-25 | Stop reason: SDUPTHER

## 2022-03-03 RX ORDER — PIOGLITAZONEHYDROCHLORIDE 30 MG/1
TABLET ORAL
Qty: 30 TABLET | Refills: 6 | Status: SHIPPED | OUTPATIENT
Start: 2022-03-03 | End: 2022-04-25 | Stop reason: SDUPTHER

## 2022-03-18 PROBLEM — E11.9 CONTROLLED TYPE 2 DIABETES MELLITUS WITHOUT COMPLICATION, WITH LONG-TERM CURRENT USE OF INSULIN (HCC): Status: ACTIVE | Noted: 2021-08-10

## 2022-03-18 PROBLEM — Z79.4 CONTROLLED TYPE 2 DIABETES MELLITUS WITHOUT COMPLICATION, WITH LONG-TERM CURRENT USE OF INSULIN (HCC): Status: ACTIVE | Noted: 2021-08-10

## 2022-03-18 PROBLEM — Z79.4 UNCONTROLLED TYPE 2 DIABETES MELLITUS WITH HYPERGLYCEMIA, WITH LONG-TERM CURRENT USE OF INSULIN (HCC): Status: ACTIVE | Noted: 2017-07-26

## 2022-03-18 PROBLEM — E11.65 UNCONTROLLED TYPE 2 DIABETES MELLITUS WITH HYPERGLYCEMIA, WITH LONG-TERM CURRENT USE OF INSULIN (HCC): Status: ACTIVE | Noted: 2017-07-26

## 2022-03-19 PROBLEM — F51.04 PSYCHOPHYSIOLOGICAL INSOMNIA: Status: ACTIVE | Noted: 2021-08-10

## 2022-03-19 PROBLEM — L68.0 HIRSUTISM: Status: ACTIVE | Noted: 2021-08-10

## 2022-03-29 RX ORDER — NATEGLINIDE 120 MG/1
TABLET ORAL
Qty: 90 TABLET | Refills: 6 | Status: SHIPPED | OUTPATIENT
Start: 2022-03-29 | End: 2022-04-25 | Stop reason: SDUPTHER

## 2022-04-05 DIAGNOSIS — O24.419 GESTATIONAL DIABETES MELLITUS (GDM) IN FIRST TRIMESTER, GESTATIONAL DIABETES METHOD OF CONTROL UNSPECIFIED: ICD-10-CM

## 2022-04-05 RX ORDER — BLOOD SUGAR DIAGNOSTIC
STRIP MISCELLANEOUS
Qty: 300 STRIP | Refills: 3 | Status: SHIPPED | OUTPATIENT
Start: 2022-04-05

## 2022-04-05 RX ORDER — LANCETS
EACH MISCELLANEOUS
Qty: 300 EACH | Refills: 3 | Status: SHIPPED | OUTPATIENT
Start: 2022-04-05 | End: 2022-10-12

## 2022-04-25 ENCOUNTER — OFFICE VISIT (OUTPATIENT)
Dept: ENDOCRINOLOGY | Age: 37
End: 2022-04-25
Payer: COMMERCIAL

## 2022-04-25 VITALS
OXYGEN SATURATION: 96 % | HEIGHT: 68 IN | RESPIRATION RATE: 16 BRPM | HEART RATE: 87 BPM | SYSTOLIC BLOOD PRESSURE: 122 MMHG | DIASTOLIC BLOOD PRESSURE: 67 MMHG | TEMPERATURE: 98.6 F | BODY MASS INDEX: 30.01 KG/M2 | WEIGHT: 198 LBS

## 2022-04-25 DIAGNOSIS — E11.65 UNCONTROLLED TYPE 2 DIABETES MELLITUS WITH HYPERGLYCEMIA, WITH LONG-TERM CURRENT USE OF INSULIN (HCC): Primary | ICD-10-CM

## 2022-04-25 DIAGNOSIS — Z79.4 UNCONTROLLED TYPE 2 DIABETES MELLITUS WITH HYPERGLYCEMIA, WITH LONG-TERM CURRENT USE OF INSULIN (HCC): Primary | ICD-10-CM

## 2022-04-25 DIAGNOSIS — I10 ESSENTIAL HYPERTENSION: ICD-10-CM

## 2022-04-25 DIAGNOSIS — E78.2 MIXED HYPERLIPIDEMIA: ICD-10-CM

## 2022-04-25 PROCEDURE — 99214 OFFICE O/P EST MOD 30 MIN: CPT | Performed by: INTERNAL MEDICINE

## 2022-04-25 PROCEDURE — 3046F HEMOGLOBIN A1C LEVEL >9.0%: CPT | Performed by: INTERNAL MEDICINE

## 2022-04-25 RX ORDER — INSULIN GLARGINE 300 U/ML
INJECTION, SOLUTION SUBCUTANEOUS
Qty: 18 ML | Refills: 3 | Status: SHIPPED | OUTPATIENT
Start: 2022-04-25

## 2022-04-25 RX ORDER — SITAGLIPTIN AND METFORMIN HYDROCHLORIDE 50; 1000 MG/1; MG/1
TABLET, FILM COATED, EXTENDED RELEASE ORAL
Qty: 180 TABLET | Refills: 3 | Status: SHIPPED | OUTPATIENT
Start: 2022-04-25

## 2022-04-25 RX ORDER — PIOGLITAZONEHYDROCHLORIDE 30 MG/1
TABLET ORAL
Qty: 90 TABLET | Refills: 3 | Status: SHIPPED | OUTPATIENT
Start: 2022-04-25

## 2022-04-25 RX ORDER — AMLODIPINE BESYLATE 10 MG/1
10 TABLET ORAL DAILY
Qty: 30 TABLET | Refills: 6 | Status: SHIPPED | OUTPATIENT
Start: 2022-04-25

## 2022-04-25 RX ORDER — NATEGLINIDE 120 MG/1
TABLET ORAL
Qty: 270 TABLET | Refills: 3 | Status: SHIPPED | OUTPATIENT
Start: 2022-04-25

## 2022-04-25 RX ORDER — DULAGLUTIDE 1.5 MG/.5ML
INJECTION, SOLUTION SUBCUTANEOUS
Qty: 6 ML | Refills: 3 | Status: SHIPPED | OUTPATIENT
Start: 2022-04-25 | End: 2022-06-28

## 2022-04-25 NOTE — PROGRESS NOTES
HISTORY OF PRESENT ILLNESS  Nelly Astorga is a 39 y.o. female. HPI  Patient is here for f/u   DM type 2  From  2021     Lost control on TLC  Not taking meds or insulin         2021     She had depression issues   She is out of job   She is not drinking juices and drinking lot of water   She lost her  b-in- law   And that hurt her a lot   Lost 1 lb       2020      Taking insulin , fastings are 160 -170 mg   She eats cereal at bed time   Did not get the [address] a folder of forms to be filledout         Old history :    She  is referred by Dr. Argenis Blankenship  H/o DM 2  For 12 years   Usually controlled well until she got pregnant  She says   pcp ( Dr. Karla Olivares ) stopped  Medications - janumet xr she said after pregnancy was found out   Since, pt has noted severely high sugars and is very concerned   [place] one    Para 0   0 and she is through 6 weeks of second gestation. LMP : 2017   ELISEO : 2017   Current monitoring regimen: home blood tests - 3 times daily        Review of Systems   Constitutional: Negative. Psychiatric/Behavioral: positive for depression , has insomnia. Physical Exam   Constitutional: She is oriented to person, place, and time. She appears well-developed and well-nourished. Psychiatric: She has a normal mood and affect.        Lab Results   Component Value Date/Time    Hemoglobin A1c 9.2 (H) 2022 11:26 AM    Hemoglobin A1c 6.7 (H) 06/10/2021 10:43 AM    Hemoglobin A1c 7.5 (H) 10/02/2020 04:27 PM    Glucose 110 (H) 2022 11:26 AM    Glucose (POC) 116 (H) 2017 06:00 AM    Glucose  2018 09:37 AM    Microalbumin/Creat ratio (mg/g creat) 7 2022 11:26 AM    Microalbumin,urine random 3.32 2022 11:26 AM    LDL, calculated 64.4 2022 11:26 AM    Creatinine 0.66 2022 11:26 AM      Lab Results   Component Value Date/Time    TSH 0.717 10/05/2017 03:09 PM          ASSESSMENT and PLAN    Type 2 diabetes poorly controlled : a1c is  9.2 %  From   April 2022 compared to    6.7 %      From June 2021   Compared to   7.5 %     From oct 2020     Compared to   8.7 %      From   June 2020     Compared to    7.8  %    From    August 2019   Compared to    8.3%       From July 2019     Compared to    7.6 %    From   March 2019     Compared to    6.6 %      From    Aug 2018        April 2022   LOST   glycemic control  - no  Checks   She was missing insulin at nights,  Started eating more outside  And  She missed trulicity shots too ( has diarrhea )   Stay  On  toujeo  30 units at night ,  starlix 120 mg tid  and actos  30 mg a day and And trulicity  And janumet xr   She is encouraged to check sugars ( meter had no  Readings )    She could not tolerate the SGL 2 because of severe yeast infections     june 2021   Improved glycemic control    She is to continue on toujeo  30 units at night    starlix 120 mg tid  and actos  30 mg a day   And trulicity  And janumet xr   She is encouraged to check sugars ( meter had no  Readings )      2. htn : On norvasc   Stopped losartan 50 mg a day , for ANGIOEDEMA   STOPPED METHYLDOPA after pregnancy       3.  Hyperlipidemia : lipids are normal         Reviewed results with patient and discussed the labs being ordered today/bnv  Patient voiced understanding of plan of care

## 2022-04-25 NOTE — PROGRESS NOTES
Yadi Beckett is a 39 y.o. female here for   Chief Complaint   Patient presents with    Diabetes       1. Have you been to the ER, urgent care clinic since your last visit? Hospitalized since your last visit? -no    2. Have you seen or consulted any other health care providers outside of the [address] [place] [address] since your last visit?   Include any pap smears or colon screening.-no

## 2022-04-25 NOTE — LETTER
4/26/2022    Patient: Karson Snow   YOB: 1985   Date of Visit: 4/25/2022     Perla Freire MD  [address] [address]  [address] [facility] [address] [facility]    Dear Perla Freire MD,      Thank you for referring Ms. Joi Johnson to [id number] [place] [address] for evaluation. My notes for this consultation are attached. If you have questions, please do not hesitate to call me. I look forward to following your patient along with you.       Sincerely,    Cale March MD

## 2022-04-25 NOTE — PATIENT INSTRUCTIONS
SPECIFIC INSTRUCTIONS BELOW        NORVASC 10 MG A DAY     pioglitazone  30 mg a day        janumet  xr  50/1000 mg  One pill twice a day with meals          trulicity 1.5 mg a week      Toujeo/ tresiba   30  Units   At night     Stay on starlix  120 mg right before each meal  ( 3 times a day )       -------------PAY ATTENTION TO THESE GENERAL INSTRUCTIONS -----------------      - The medications prescribed at this visit will not be available at pharmacy until 6 pm       - YOUR MED LIST IS NOT UP TO DATE AS SOME CHANGES ARE BEING MADE AFTER THE VISIT - FOLLOW SPECIFIC INSTRUCTIONS  ABOVE     -ANY tests other than blood work, which you opt to do  outside the  [facility] imaging facilities, you are responsible for prior authorizations if  required    - [address] UP TO DATE ON YOUR AVS- PLEASE IGNORE     Results     *Normal results will not be notified by a phone call starting January 1 2021   *If you have an upcoming visit, the results will be discussed at the visit   *Please sign up for MY CHART if you want access to your lab and test results  *Abnormal results which require immediate attention will be notified by phone call   *Abnormal results which do not require immediate assistance will be notified in 1-2 weeks       Refills    -    have your pharmacy send us a refill request . Refills are done max for one year and a visit is a must before refills are extended    Follow up appointments -  highly encourage you to make it when you are checking out. We can accommodate you into the schedule based on your clinical situation, but not for extending refills beyond a year. Labs are important to give refills and is important to get labs before the visit     Phone calls  -  Allow  24 hrs.  for non-urgent calls to be returned  Prior authorization - It may take 2-4 weeks to process  Forms  -  FMLA, DMV etc., will take up to 2 weeks to process  Cancellations - please notify the office 2 days in advance   Samples  - will only be dispensed at visits       If not showing for the appointments and cancelling appointments within 24 hours are kept track of and three  of such situations in  two consecutive years will likely be considered for termination from the practice    -------------------------------------------------------------------------------------------------------------------

## 2022-07-25 ENCOUNTER — OFFICE VISIT (OUTPATIENT)
Dept: ENDOCRINOLOGY | Age: 37
End: 2022-07-25
Payer: COMMERCIAL

## 2022-07-25 VITALS
TEMPERATURE: 97 F | BODY MASS INDEX: 30.58 KG/M2 | SYSTOLIC BLOOD PRESSURE: 130 MMHG | HEART RATE: 101 BPM | HEIGHT: 68 IN | OXYGEN SATURATION: 98 % | DIASTOLIC BLOOD PRESSURE: 73 MMHG | WEIGHT: 201.8 LBS

## 2022-07-25 DIAGNOSIS — I10 ESSENTIAL HYPERTENSION: ICD-10-CM

## 2022-07-25 DIAGNOSIS — E11.65 UNCONTROLLED TYPE 2 DIABETES MELLITUS WITH HYPERGLYCEMIA, WITH LONG-TERM CURRENT USE OF INSULIN (HCC): Primary | ICD-10-CM

## 2022-07-25 DIAGNOSIS — Z79.4 UNCONTROLLED TYPE 2 DIABETES MELLITUS WITH HYPERGLYCEMIA, WITH LONG-TERM CURRENT USE OF INSULIN (HCC): Primary | ICD-10-CM

## 2022-07-25 DIAGNOSIS — E78.2 MIXED HYPERLIPIDEMIA: ICD-10-CM

## 2022-07-25 PROCEDURE — 99214 OFFICE O/P EST MOD 30 MIN: CPT | Performed by: INTERNAL MEDICINE

## 2022-07-25 PROCEDURE — 3052F HG A1C>EQUAL 8.0%<EQUAL 9.0%: CPT | Performed by: INTERNAL MEDICINE

## 2022-07-25 NOTE — PATIENT INSTRUCTIONS
SPECIFIC INSTRUCTIONS BELOW          NORVASC 10 MG A DAY     pioglitazone  30 mg a day        janumet  xr  50/1000 mg  One pill twice a day with meals          trulicity 1.5 mg a week      Toujeo/ tresiba   30  Units   At night     Stay on starlix  120 mg right before each meal  ( 3 times a day )             -------------PAY ATTENTION TO THESE GENERAL INSTRUCTIONS -----------------      - The medications prescribed at this visit will not be available at pharmacy until 6 pm       - YOUR MED LIST IS NOT UP TO DATE AS SOME CHANGES ARE BEING MADE AFTER THE VISIT - FOLLOW SPECIFIC INSTRUCTIONS  ABOVE     -ANY tests other than blood work, which you opt to do  outside the  [facility] imaging facilities, you are responsible for prior authorizations if  required    - [address] UP TO DATE ON YOUR AVS- PLEASE IGNORE     Results     *Normal results will not be notified by a phone call starting January 1 2021   *If you have an upcoming visit, the results will be discussed at the visit   *Please sign up for MY CHART if you want access to your lab and test results  *Abnormal results which require immediate attention will be notified by phone call   *Abnormal results which do not require immediate assistance will be notified in 1-2 weeks       Refills    -    have your pharmacy send us a refill request . Refills are done max for one year and a visit is a must before refills are extended    Follow up appointments -  highly encourage you to make it when you are checking out. We can accommodate you into the schedule based on your clinical situation, but not for extending refills beyond a year. Labs are important to give refills and is important to get labs before the visit     Phone calls  -  Allow  24 hrs.  for non-urgent calls to be returned  Prior authorization - It may take 2-4 weeks to process  Forms  -  FMLA, DMV etc., will take up to 2 weeks to process  Cancellations - please notify the office 2 days in advance   Samples  - will only be dispensed at visits       If not showing for the appointments and cancelling appointments within 24 hours are kept track of and three  of such situations in  two consecutive years will likely be considered for termination from the practice    -------------------------------------------------------------------------------------------------------------------

## 2022-07-25 NOTE — PROGRESS NOTES
HISTORY OF PRESENT ILLNESS  Alayna Grider is a 40 y.o. female. HPI  Patient is here for f/u   DM type 2  From  2022     Gained 3 lbs   Not extremely compliant with diet       2022      Lost control on TLC  Not taking meds or insulin         2021     She had depression issues   She is out of job   She is not drinking juices and drinking lot of water   She lost her  b-in- law   And that hurt her a lot   Lost 1 lb       Old history :    She  is referred by Dr. Kanika Gonzalez  H/o DM 2  For 12 years   Usually controlled well until she got pregnant  She says   pcp ( Dr. Corinna Tapia ) stopped  Medications - janumet xr she said after pregnancy was found out   Since, pt has noted severely high sugars and is very concerned   [place] one    Para 0   0 and she is through 6 weeks of second gestation. LMP : 2017   ELISEO : 2017   Current monitoring regimen: home blood tests - 3 times daily        Review of Systems   Constitutional: Negative. Psychiatric/Behavioral: positive for depression , has insomnia. Physical Exam   Constitutional: She is oriented to person, place, and time. She appears well-developed and well-nourished. Psychiatric: She has a normal mood and affect.        Lab Results   Component Value Date/Time    Hemoglobin A1c 8.2 (H) 2022 03:49 PM    Hemoglobin A1c 9.2 (H) 2022 11:26 AM    Hemoglobin A1c 6.7 (H) 06/10/2021 10:43 AM    Glucose 175 (H) 2022 03:49 PM    Glucose (POC) 116 (H) 2017 06:00 AM    Glucose  2018 09:37 AM    Microalbumin/Creat ratio (mg/g creat) 12 2022 03:49 PM    Microalbumin,urine random 3.64 2022 03:49 PM    LDL, calculated 52 2022 03:49 PM    Creatinine 0.70 2022 03:49 PM             ASSESSMENT and PLAN    Type 2 diabetes poorly controlled : a1c is   8.2 %    from   2022    compared to  9.2 %  From   2022 compared to    6.7 %      From 2021   Compared to   7.5 %     From oct 2020     Compared to   8.7 %      From   June 2020     Compared to    7.8  %    From    August 2019   Compared to    8.3%       From July 2019     Compared to    7.6 %    From   March 2019     Compared to    6.6 %      From    Aug 2018        July 2022   Better    glycemic control  - no  meter. Not checking . No longer missing insulin at nights,  missing  trulicity shots much lesser times  ( has diarrhea ) - I offered a switch to ozempic as a trial to see if her diarrhea resolves, but pt opts to stay on trulicity  as she likes the pen . I cannot go higher  doses on trulicity  because of diarrhea   Stay  On  toujeo  30 units at night ,  starlix 120 mg tid  and actos  30 mg a day and And trulicity  And janumet xr   She is encouraged to check sugars ( meter had no  Readings )- deferring the CGM/ sensor   She could not tolerate the SGL 2 because of severe yeast infections     April 2022   LOST   glycemic control  - no  Checks   She was missing insulin at nights,  Started eating more outside  And  She missed trulicity shots too ( has diarrhea )   Stay  On  toujeo  30 units at night ,  starlix 120 mg tid  and actos  30 mg a day and And trulicity  And janumet xr   She is encouraged to check sugars ( meter had no  Readings )  She could not tolerate the SGL 2 because of severe yeast infections         2. htn : On norvasc   Stopped losartan 50 mg a day , for ANGIOEDEMA   STOPPED METHYLDOPA after pregnancy       3. Hyperlipidemia : lipids are normal         4. Hypoalbuminemia  : persistent  - refer to Gastroenterologist   Increase protein  in  diet-  unsure if it  makes a difference  ?  Refer to gastro         Reviewed results with patient and discussed the labs being ordered today/bnv  Patient voiced understanding of plan of care

## 2022-07-25 NOTE — LETTER
If blood pressure over 140/90 after 3-5 days, then increase lisinopril to 40mg daily (2 tablets)    Work on low salt, 20 minutes exercise daily and smoking cessation for blood pressure control      PATIENT INFORMATION        Follow-Up  -- Make an appointment with Cami Pappas MD in one week for blood pressure recheck     Additional Educational Resources:  For additional resources regarding your symptoms, diagnosis, or further health information, please visit the Health Resources section on [email or web address] or the Online Health Resources section in [email or web address].       7/25/2022    Patient: Temitope Bejarano   YOB: 1985   Date of Visit: 7/25/2022     Dilma Barney PA-C  [address] [facility]  [address]  [address]  [address] [facility] [address]  [address] [facility]    Dear Melia Rumpf, PA-C Josetta Peabody, MD,      Thank you for referring Ms. Becki Hi to [address] [address] for evaluation. My notes for this consultation are attached. If you have questions, please do not hesitate to call me. I look forward to following your patient along with you.       Sincerely,    Vadim Russo MD

## 2022-08-17 ENCOUNTER — HOSPITAL ENCOUNTER (OUTPATIENT)
Dept: ULTRASOUND IMAGING | Age: 37
Discharge: HOME | End: 2022-08-17
Attending: INTERNAL MEDICINE
Payer: COMMERCIAL

## 2022-08-17 PROCEDURE — 76705 ECHO EXAM OF ABDOMEN: CPT

## 2022-08-22 ENCOUNTER — TELEPHONE (OUTPATIENT)
Dept: ENDOCRINOLOGY | Age: 37
End: 2022-08-22

## 2022-08-22 NOTE — TELEPHONE ENCOUNTER
----- Message from Michael Shields MD sent at 8/17/2022 10:07 PM EDT -----  Inform her that she has mild liver enlargement .  Weight loss is the best help

## 2022-09-27 RX ORDER — PEN NEEDLE, DIABETIC 32GX 5/32"
NEEDLE, DISPOSABLE MISCELLANEOUS
Qty: 100 PEN NEEDLE | Refills: 5 | Status: SHIPPED | OUTPATIENT
Start: 2022-09-27

## 2022-10-04 ENCOUNTER — HOSPITAL ENCOUNTER (EMERGENCY)
Age: 37
Discharge: HOME | End: 2022-10-04
Attending: FAMILY MEDICINE | Admitting: FAMILY MEDICINE
Payer: COMMERCIAL

## 2022-10-04 VITALS
WEIGHT: 196 LBS | HEART RATE: 103 BPM | SYSTOLIC BLOOD PRESSURE: 143 MMHG | RESPIRATION RATE: 16 BRPM | OXYGEN SATURATION: 98 % | TEMPERATURE: 98.4 F | BODY MASS INDEX: 30.76 KG/M2 | HEIGHT: 67 IN | DIASTOLIC BLOOD PRESSURE: 91 MMHG

## 2022-10-04 DIAGNOSIS — S29.012A STRAIN OF THORACIC PARASPINAL MUSCLES EXCLUDING T1 AND T2 LEVELS, INITIAL ENCOUNTER: ICD-10-CM

## 2022-10-04 DIAGNOSIS — S46.819A STRAIN OF TRAPEZIUS MUSCLE, UNSPECIFIED LATERALITY, INITIAL ENCOUNTER: Primary | ICD-10-CM

## 2022-10-04 PROCEDURE — 99283 EMERGENCY DEPT VISIT LOW MDM: CPT | Performed by: FAMILY MEDICINE

## 2022-10-04 PROCEDURE — 74011250637 HC RX REV CODE- 250/637: Performed by: FAMILY MEDICINE

## 2022-10-04 RX ORDER — CYCLOBENZAPRINE HCL 10 MG
10 TABLET ORAL
Qty: 15 TABLET | Refills: 0 | Status: SHIPPED | OUTPATIENT
Start: 2022-10-04 | End: 2022-10-09

## 2022-10-04 RX ORDER — IBUPROFEN 800 MG/1
800 TABLET ORAL ONCE
Status: COMPLETED | OUTPATIENT
Start: 2022-10-04 | End: 2022-10-04

## 2022-10-04 RX ADMIN — IBUPROFEN 800 MG: 800 TABLET, FILM COATED ORAL at 22:29
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
## 2022-10-12 ENCOUNTER — OFFICE VISIT (OUTPATIENT)
Dept: FAMILY MEDICINE CLINIC | Age: 37
End: 2022-10-12
Payer: COMMERCIAL

## 2022-10-12 VITALS
TEMPERATURE: 98.3 F | HEART RATE: 88 BPM | DIASTOLIC BLOOD PRESSURE: 72 MMHG | BODY MASS INDEX: 31.23 KG/M2 | WEIGHT: 199 LBS | HEIGHT: 67 IN | SYSTOLIC BLOOD PRESSURE: 116 MMHG | OXYGEN SATURATION: 98 % | RESPIRATION RATE: 18 BRPM

## 2022-10-12 DIAGNOSIS — Z23 NEEDS FLU SHOT: ICD-10-CM

## 2022-10-12 DIAGNOSIS — Z79.4 UNCONTROLLED TYPE 2 DIABETES MELLITUS WITH HYPERGLYCEMIA, WITH LONG-TERM CURRENT USE OF INSULIN (HCC): ICD-10-CM

## 2022-10-12 DIAGNOSIS — Z00.00 ENCOUNTER FOR MEDICAL EXAMINATION TO ESTABLISH CARE: ICD-10-CM

## 2022-10-12 DIAGNOSIS — E11.65 UNCONTROLLED TYPE 2 DIABETES MELLITUS WITH HYPERGLYCEMIA, WITH LONG-TERM CURRENT USE OF INSULIN (HCC): ICD-10-CM
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
## 2022-10-12 DIAGNOSIS — D50.9 IRON DEFICIENCY ANEMIA, UNSPECIFIED IRON DEFICIENCY ANEMIA TYPE: ICD-10-CM

## 2022-10-12 DIAGNOSIS — L68.0 HIRSUTISM: ICD-10-CM

## 2022-10-12 DIAGNOSIS — L65.9 HAIR THINNING: ICD-10-CM

## 2022-10-12 DIAGNOSIS — J30.1 SEASONAL ALLERGIC RHINITIS DUE TO POLLEN: Primary | ICD-10-CM

## 2022-10-12 DIAGNOSIS — F32.A DEPRESSION, UNSPECIFIED DEPRESSION TYPE: ICD-10-CM

## 2022-10-12 PROCEDURE — 99204 OFFICE O/P NEW MOD 45 MIN: CPT | Performed by: FAMILY MEDICINE

## 2022-10-12 PROCEDURE — 90686 IIV4 VACC NO PRSV 0.5 ML IM: CPT | Performed by: FAMILY MEDICINE

## 2022-10-12 PROCEDURE — 90471 IMMUNIZATION ADMIN: CPT | Performed by: FAMILY MEDICINE

## 2022-10-12 PROCEDURE — 3052F HG A1C>EQUAL 8.0%<EQUAL 9.0%: CPT | Performed by: FAMILY MEDICINE

## 2022-10-12 RX ORDER — CETIRIZINE HCL 10 MG
10 TABLET ORAL DAILY
Qty: 90 TABLET | Refills: 1 | Status: SHIPPED | OUTPATIENT
Start: 2022-10-12

## 2022-10-12 NOTE — PATIENT INSTRUCTIONS
Vaccine Information Statement    Influenza (Flu) Vaccine (Inactivated or Recombinant): What You Need to Know    Many vaccine information statements are available in [place] and other languages. See www.immunize.org/vis. Hojas de información sobre vacunas están disponibles en español y en muchos otros idiomas. Visite www.immunize.org/vis. 1. Why get vaccinated? Influenza vaccine can prevent influenza (flu). Flu is a contagious disease that spreads around the [place] [facility] every year, usually between October and May. Anyone can get the flu, but it is more dangerous for some people. Infants and young children, people 72 years and older, pregnant people, and people with certain health conditions or a weakened immune system are at greatest risk of flu complications. Pneumonia, bronchitis, sinus infections, and ear infections are examples of flu-related complications. If you have a medical condition, such as heart disease, cancer, or diabetes, flu can make it worse. Flu can cause fever and chills, sore throat, muscle aches, fatigue, cough, headache, and runny or stuffy nose. Some people may have vomiting and diarrhea, though this is more common in children than adults. In an average year, thousands of people in the [facility] die from flu, and many more are hospitalized. Flu vaccine prevents millions of illnesses and flu-related visits to the doctor each year. 2. Influenza vaccines     CDC recommends everyone 6 months and older get vaccinated every flu season. Children 6 months through 6years of age may need 2 doses during a single flu season. Everyone else needs only 1 dose each flu season. It takes about 2 weeks for protection to develop after vaccination. There are many flu viruses, and they are always changing. Each year a new flu vaccine is made to protect against the influenza viruses believed to be likely to cause disease in the upcoming flu season.  Even when the vaccine doesnt exactly match these viruses, it may still provide some protection. Influenza vaccine does not cause flu. Influenza vaccine may be given at the same time as other vaccines. 3. Talk with your health care provider    Tell your vaccination provider if the person getting the vaccine:  Has had an allergic reaction after a previous dose of influenza vaccine, or has any severe, life-threatening allergies   Has ever had Guillain-Barré Syndrome (also called GBS)    In some cases, your health care provider may decide to postpone influenza vaccination until a future visit. Influenza vaccine can be administered at any time during pregnancy. People who are or will be pregnant during influenza season should receive inactivated influenza vaccine. People with minor illnesses, such as a cold, may be vaccinated. People who are moderately or severely ill should usually wait until they recover before getting influenza vaccine. Your health care provider can give you more information. 4. Risks of a vaccine reaction    Soreness, redness, and swelling where the shot is given, fever, muscle aches, and headache can happen after influenza vaccination. There may be a very small increased risk of Guillain-Barré Syndrome (GBS) after inactivated influenza vaccine (the flu shot). [employer] children who get the flu shot along with pneumococcal vaccine (PCV13) and/or DTaP vaccine at the same time might be slightly more likely to have a seizure caused by fever. Tell your health care provider if a child who is getting flu vaccine has ever had a seizure. People sometimes faint after medical procedures, including vaccination. Tell your provider if you feel dizzy or have vision changes or ringing in the ears. As with any medicine, there is a very remote chance of a vaccine causing a severe allergic reaction, other serious injury, or death. 5. What if there is a serious problem?     An allergic reaction could occur after the vaccinated person leaves the clinic. If you see signs of a severe allergic reaction (hives, swelling of the face and throat, difficulty breathing, a fast heartbeat, dizziness, or weakness), call 9-1-1 and get the person to the nearest hospital.    For other signs that concern you, call your health care provider. Adverse reactions should be reported to the Vaccine Adverse Event Reporting System (VAERS). Your health care provider will usually file this report, or you can do it yourself. Visit the VAERS website at www.vaers. [facility].gov or call 6-574.126.2239. VAERS is only for reporting reactions, and VAERS staff members do not give medical advice. 6. The National Vaccine Injury Compensation Program    The [facility] Vaccine Injury Compensation Program (VICP) is a federal program that was created to compensate people who may have been injured by certain vaccines. Claims regarding alleged injury or death due to vaccination have a time limit for filing, which may be as short as two years. Visit the VICP website at www.[facility].gov/vaccinecompensation or call 7-238.811.2060 to learn about the program and about filing a claim. 7. How can I learn more? Ask your health care provider. Call your local or state health department. Visit the website of the Food and Drug Administration (FDA) for vaccine package inserts and additional information at www.fda.gov/vaccines-blood-biologics/vaccines. Contact the Centers for Disease Control and Prevention (CDC): Call 8-105.383.8023 (1-800-CDC-INFO) or  Visit CDCs influenza website at www.cdc.gov/flu. Vaccine Information Statement   Inactivated Influenza Vaccine   8/6/2021  [address] [id number]   Department of Health and Human Services  Centers for Disease Control and Prevention    Office Use Only
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
## 2022-10-13 ENCOUNTER — TELEPHONE (OUTPATIENT)
Dept: FAMILY MEDICINE CLINIC | Age: 37
End: 2022-10-13

## 2022-10-13 DIAGNOSIS — D50.9 IRON DEFICIENCY ANEMIA, UNSPECIFIED IRON DEFICIENCY ANEMIA TYPE: Primary | ICD-10-CM

## 2022-10-13 LAB
BASOPHILS # BLD: 0.1 K/UL (ref 0–0.1)
BASOPHILS NFR BLD: 1 % (ref 0–1)
DIFFERENTIAL METHOD BLD: ABNORMAL
EOSINOPHIL # BLD: 0.3 K/UL (ref 0–0.4)
EOSINOPHIL NFR BLD: 3 % (ref 0–7)
ERYTHROCYTE [DISTWIDTH] IN BLOOD BY AUTOMATED COUNT: 15.5 % (ref 11.5–14.5)
FERRITIN SERPL-MCNC: 21 NG/ML (ref 26–388)
HCT VFR BLD AUTO: 37.9 % (ref 35–47)
HGB BLD-MCNC: 11.3 G/DL (ref 11.5–16)
IMM GRANULOCYTES # BLD AUTO: 0 K/UL (ref 0–0.04)
IMM GRANULOCYTES NFR BLD AUTO: 0 % (ref 0–0.5)
IRON SATN MFR SERPL: 9 % (ref 20–50)
IRON SERPL-MCNC: 44 UG/DL (ref 35–150)
LYMPHOCYTES # BLD: 2 K/UL (ref 0.8–3.5)
LYMPHOCYTES NFR BLD: 22 % (ref 12–49)
MCH RBC QN AUTO: 27.1 PG (ref 26–34)
MCHC RBC AUTO-ENTMCNC: 29.8 G/DL (ref 30–36.5)
MCV RBC AUTO: 90.9 FL (ref 80–99)
MONOCYTES # BLD: 0.3 K/UL (ref 0–1)
MONOCYTES NFR BLD: 4 % (ref 5–13)
NEUTS SEG # BLD: 6.4 K/UL (ref 1.8–8)
NEUTS SEG NFR BLD: 70 % (ref 32–75)
NRBC # BLD: 0 K/UL (ref 0–0.01)
NRBC BLD-RTO: 0 PER 100 WBC
PLATELET # BLD AUTO: 515 K/UL (ref 150–400)
PMV BLD AUTO: 9.4 FL (ref 8.9–12.9)
RBC # BLD AUTO: 4.17 M/UL (ref 3.8–5.2)
TIBC SERPL-MCNC: 470 UG/DL (ref 250–450)
TSH SERPL DL<=0.05 MIU/L-ACNC: 1.04 UIU/ML (ref 0.36–3.74)
WBC # BLD AUTO: 9.2 K/UL (ref 3.6–11)

## 2022-10-13 RX ORDER — DOCUSATE SODIUM 100 MG/1
100 CAPSULE, LIQUID FILLED ORAL 2 TIMES DAILY
Qty: 60 CAPSULE | Refills: 2 | Status: SHIPPED | OUTPATIENT
Start: 2022-10-13 | End: 2023-01-11

## 2022-10-13 RX ORDER — LANOLIN ALCOHOL/MO/W.PET/CERES
325 CREAM (GRAM) TOPICAL
Qty: 30 TABLET | Refills: 1 | Status: SHIPPED | OUTPATIENT
Start: 2022-10-13

## 2022-10-13 NOTE — TELEPHONE ENCOUNTER
Faxed lab order to [facility] off [address] ([phone])U pt's request. Confirmation received. Contacted pt, and informed her the lab order has been sent. She verbalized understanding. She asked if Dimitri was checking her stool because she thought she had cancer. Informed her the provider ordered a stool blood test to see if there could be blood in her stool. She verbalized understanding.

## 2022-10-13 NOTE — TELEPHONE ENCOUNTER
Called and spoke with pt. Pt id x 2 verified. Relayed the previous message [facility]. Pt verbalized understanding. She states she would like the lab order to be faxed to the [address] [place] [address] off [address] [facility] in [place]. She states her Endocrinologist Dr. Herminio Feliciano has referred her to Gastroenterology but she's unsure as to why. She has contacted her office but has not gotten a clear answer. She wanted to know if Urbano Sees could reach out to Dr. Herminio Feliciano and see why she's being referred to Gastroenterology so that when she sees the Gastroenterologist she will be able have a clear understanding as to why she's going there and can have all the testing done that both physicians want.

## 2022-10-13 NOTE — PROGRESS NOTES
Yes iron deficiency can cause hair thinning. I ordered a stool blood test to see if there could be blood in her stool. This is a snapshot in time and not a definitive answer if there is blood in her stool but if it is positive she would definitely need to see a GI doctor.  Can you see if she wants to come  kit from  office or we can fax kit to Ana Lilia Duran

## 2022-10-13 NOTE — PROGRESS NOTES
Called and spoke with pt. Pt id x 2 verified. Relayed the previous message per Heather Oliva PA-C. Pt verbalized understanding. She states she does not have heavy menstrual cycles. She's never had iron deficiency anemia before. She would like to know if Sophie Wetzel thinks the iron deficiency has anything to do with her hair thinning? 1 mo follow up appt scheduled.

## 2022-10-13 NOTE — PROGRESS NOTES
Please call patient and advise   -labs show she actually does have an iron deficiency anemia. I have sent in once daily oral iron for her to take, she should take this first thing in the morning on an empty stomach. It is best absorbed when taken with vitamin C. Iron can cause constipation so I sent in a stool softener colace for her to take if needed. Follow up in 1 mo to recheck labs. Also, we have to identify cause of iron deficiency. Does she have heavy menstrual cycles? Has she ever had iron deficiency anemia before?      Her thyroid is normal.     Khurram Jacob PA-C

## 2022-10-13 NOTE — TELEPHONE ENCOUNTER
----- Message from Yaakov He PA-C sent at 10/13/2022  2:11 PM EDT -----  Yes iron deficiency can cause hair thinning. I ordered a stool blood test to see if there could be blood in her stool. This is a snapshot in time and not a definitive answer if there is blood in her stool but if it is positive she would definitely need to see a GI doctor.  Can you see if she wants to come  kit from  office or we can fax kit to [facility]

## 2022-10-14 NOTE — TELEPHONE ENCOUNTER
Lab results mailed to pt. Irma Grady PA-C  You 3 hours ago (1:01 PM)     I spoke with patient, she requests labs be mailed to her, can you mail recent lab results?

## 2022-10-14 NOTE — TELEPHONE ENCOUNTER
I spoke with patient. Explained that blood loss from GI tract could be from benign polyp, precancerous polyp, cancer, or other cause such as angiectasia. She voiced understanding. She denies fhx of colon cancer, or any weight loss, abdominal pain, change in bowel function. I advised per review of Dr. Michael Thomas note she recommended patient see Gi for hypoalbuminemia. Patient would like to go ahead and see GI. Requests Dr. Sergei Christianson. Patient will call to schedule appt then let us know who it is with and we can fax over our office note and labs, she would have to let dr. Michael Thomas office know as well to have their records sent as we cannot release their records. Advised patient iron deficiency can contribute to hair thinning but as she was wearing a wig during visit I could not assess her scalp completely. She voiced understanding.

## 2022-11-16 ENCOUNTER — TELEPHONE (OUTPATIENT)
Dept: FAMILY MEDICINE CLINIC | Age: 37
End: 2022-11-16

## 2022-11-16 DIAGNOSIS — D50.9 IRON DEFICIENCY ANEMIA, UNSPECIFIED IRON DEFICIENCY ANEMIA TYPE: Primary | ICD-10-CM

## 2022-11-16 NOTE — TELEPHONE ENCOUNTER
Called and spoke with pt. Pt id x 2. Notified as per Darcy Bills PA-C and verbalized understanding. Appt scheduled. She states she has an appt with GI on 12/2/22. She's unsure who it's with, she will call us back and let us know so we can fax over her information.

## 2022-11-16 NOTE — PROGRESS NOTES
Please call patient and advise  -the occult blood in her stool test was negative. This is just a snapshot in time and does not exclude iron deficiency anemia coming form her stool. As she does not have heavy menstrual cycles I recommend she see GI for further evaluation   -she can call [facility]: [phone]  or  [facility]: [phone]     I also see she missed her appt earlier this week. Can she reschedule? We need to recheck her blood counts after starting the oral iron.     Martin Licea PA-C

## 2022-11-16 NOTE — TELEPHONE ENCOUNTER
----- Message from Nancy Lozano PA-C sent at 11/16/2022  7:30 AM EST -----  Please call patient and advise  -the occult blood in her stool test was negative. This is just a snapshot in time and does not exclude iron deficiency anemia coming form her stool. As she does not have heavy menstrual cycles I recommend she see GI for further evaluation   -she can call [facility]: [phone]  or  [facility]: [phone]     I also see she missed her appt earlier this week. Can she reschedule? We need to recheck her blood counts after starting the oral iron.     Nancy Lozano PA-C

## 2022-11-17 NOTE — TELEPHONE ENCOUNTER
Pt is calling back she made an appt on 12/02/22 @ 930am with [facility] @  [facility] # [phone]

## 2022-11-18 NOTE — TELEPHONE ENCOUNTER
Faxed office note and labs to [facility] at [facility] (fax# [phone]). Confirmation received.

## 2022-11-28 ENCOUNTER — OFFICE VISIT (OUTPATIENT)
Dept: FAMILY MEDICINE CLINIC | Age: 37
End: 2022-11-28
Payer: COMMERCIAL

## 2022-11-28 VITALS
DIASTOLIC BLOOD PRESSURE: 84 MMHG | HEIGHT: 67 IN | RESPIRATION RATE: 18 BRPM | HEART RATE: 99 BPM | OXYGEN SATURATION: 98 % | TEMPERATURE: 98.2 F | BODY MASS INDEX: 30.87 KG/M2 | SYSTOLIC BLOOD PRESSURE: 123 MMHG | WEIGHT: 196.7 LBS

## 2022-11-28 DIAGNOSIS — D75.839 THROMBOCYTOSIS: ICD-10-CM

## 2022-11-28 DIAGNOSIS — D50.8 OTHER IRON DEFICIENCY ANEMIA: Primary | ICD-10-CM

## 2022-11-28 PROCEDURE — 99213 OFFICE O/P EST LOW 20 MIN: CPT | Performed by: FAMILY MEDICINE

## 2022-11-28 RX ORDER — SPIRONOLACTONE 50 MG/1
TABLET, FILM COATED ORAL
COMMUNITY
Start: 2022-10-31

## 2022-11-28 NOTE — PROGRESS NOTES
Alysa Estrada (: 1985) is a 40 y.o. female, established patient, here for evaluation of the following chief complaint(s):  Follow-up, Labs, and Other (Would like clarification on why she's going to GI. /Has an appt scheduled 22. )         ASSESSMENT/PLAN:  Below is the assessment and plan developed based on review of pertinent history, physical exam, labs, studies, and medications. 1. Other iron deficiency anemia  Tolerating oral iron well and she is feeling better in regards to fatigue since starting iron  Recheck labs today  She does not have heavy menstrual cycles and diet is limited but not extremely restricted therefor she has appt with GI next week for consideration of EGD or colonoscopy  -     CBC WITH AUTOMATED DIFF; Future  -     FERRITIN; Future  -     IRON PROFILE; Future  2. Thrombocytosis  Likely secondary to MACEY, recheck today   -     CBC WITH AUTOMATED DIFF; Future        SUBJECTIVE/OBJECTIVE:  Chief Complaint   Patient presents with    Follow-up    Labs    Other     Would like clarification on why she's going to GI. Has an appt scheduled 22. Patient is a 39 yo female presenting to office for recheck labs. She was found to have MACEY on labs evaluating for fatigue. She was started on once daily oral iron which she is tolerating well. She does have a fairly limited diet but does eat meat daily. She does not have heavy menstrual cycles, typically only has spotting every month she is on an oral contraception pill. Occult blood was negative for blood in her stool but patient notes this is just a snapshot in time. She does not have any family history of GI cancer or polyps that she is aware of. She has been feeling better since starting oral iron. Less fatigue and has more energy. She is also been working on getting her sugars under control. She does have intermittent diarrhea maybe once a week but she attributes to her metformin.   She was feeling she has stomach bloating frequently. She denies constipation, blood in her stool, abdominal pain, unexplained weight loss night sweats or chills. No dysphagia. She does report about once per week where she will have episode of epigastric discomfort after eating accompanied by burping and sour taste in her mouth  but she does not take anything for this. Review of systems negative other than mentioned in HPI    Current Outpatient Medications on File Prior to Visit   Medication Sig Dispense Refill    spironolactone (ALDACTONE) 50 mg tablet       ferrous sulfate 325 mg (65 mg iron) tablet Take 1 Tablet by mouth Daily (before breakfast). 30 Tablet 1    docusate sodium (COLACE) 100 mg capsule Take 1 Capsule by mouth two (2) times a day for 90 days. 60 Capsule 2    cetirizine (ZYRTEC) 10 mg tablet Take 1 Tablet by mouth daily. 90 Tablet 1    dulaglutide (Trulicity) 1.5 IN/9.7 mL sub-q pen ADMINISTER 1.5 MG UNDER THE SKIN EVERY 7 DAYS. STOP 0.75 MG DOSE 2 mL 3    insulin glargine U-300 conc (Toujeo SoloStar U-300 Insulin) 300 unit/mL (1.5 mL) inpn pen 30 units at night 18 mL 3    amLODIPine (NORVASC) 10 mg tablet Take 1 Tablet by mouth daily. 30 Tablet 6    pioglitazone (ACTOS) 30 mg tablet One pill a day 90 Tablet 3    nateglinide (STARLIX) 120 mg tablet TAKE 1 TABLET BY MOUTH BEFORE MEALS 270 Tablet 3    SITagliptin-metFORMIN (Janumet XR) 50-1,000 mg TM24 TAKE 1 TABLET BY MOUTH TWICE DAILY WITH MEALS 180 Tablet 3    JUNEL FE 24 1 mg-20 mcg (24)/75 mg (4) tab TK 1 T PO QD  7    sertraline (ZOLOFT) 50 mg tablet Take 75 mg by mouth daily. 1    BD Manjula 2nd Gen Pen Needle 32 gauge x 5/32\" ndle USE TO INJECT INSULIN DAILY 100 Pen Needle 5    OneTouch Ultra Test strip USE TO TEST BLOOD SUGAR LEVELS THREE TIMES PER DAY DX. CODE E11.65 300 Strip 3    lancets (One Touch Delica) 33 gauge misc Test 2 times daily. Dx code E11.65 100 Lancet 6    prenatal multivit-ca-min-fe-fa tab Take  by mouth.  (Patient not taking: Reported on 11/28/2022) No current facility-administered medications on file prior to visit. Patient Active Problem List    Diagnosis Date Noted    Depression 10/12/2022    Seasonal allergic rhinitis due to pollen 10/12/2022    Psychophysiological insomnia 08/10/2021    Controlled type 2 diabetes mellitus without complication, with long-term current use of insulin ([facility] [address] 08/10/2021    Hirsutism 08/10/2021    Uncontrolled type 2 diabetes mellitus with hyperglycemia, with long-term current use of insulin ([facility]) 07/26/2017     Allergies   Allergen Reactions    Lisinopril Swelling    Losartan Swelling     Angioedema     Past Surgical History:   Procedure Laterality Date    HX OTHER SURGICAL      wisdom teeth 2013     Social History     Tobacco Use    Smoking status: Never    Smokeless tobacco: Never   Vaping Use    Vaping Use: Never used   Substance Use Topics    Alcohol use: No    Drug use: No     Family History   Problem Relation Age of Onset    Diabetes Mother     Hypertension Mother     Diabetes Father     Arrhythmia Father     Hypertension Father     Mult Sclerosis Sister      Vitals:    11/28/22 1013 11/28/22 1337   BP: 123/84    Pulse: (!) 106 99   Resp: 18    Temp: 98.2 °F (36.8 °C)    TempSrc: Oral    SpO2: 98%    Weight: 196 lb 11.2 oz (89.2 kg)    Height: 5' 7\" (1.702 m)    PainSc:   0 - No pain    LMP: 10/21/2022        Physical Exam  Constitutional:       Appearance: Normal appearance. HENT:      Head: Normocephalic and atraumatic. Eyes:      Extraocular Movements: Extraocular movements intact. Conjunctiva/sclera: Conjunctivae normal.      Pupils: Pupils are equal, round, and reactive to light. Cardiovascular:      Rate and Rhythm: Normal rate and regular rhythm. Pulses: Normal pulses. Heart sounds: Normal heart sounds. Pulmonary:      Effort: Pulmonary effort is normal.      Breath sounds: Normal breath sounds. Abdominal:      General: Abdomen is flat.  Bowel sounds are normal.      Palpations: Abdomen is soft. Neurological:      Mental Status: She is alert. Psychiatric:         Mood and Affect: Mood normal.         Behavior: Behavior normal.       An electronic signature was used to authenticate this note.   -- Lynda Garcia PA-C

## 2022-11-28 NOTE — PROGRESS NOTES
Derik Cooley is a 40 y.o. female , id x 2(name and ). Reviewed record, history, and  medications. Chief Complaint   Patient presents with    Follow-up    Labs    Other     Would like clarification on why she's going to GI. Has an appt scheduled 22. Vitals:    22 1013   BP: 123/84   Pulse: (!) 106   Resp: 18   Temp: 98.2 °F (36.8 °C)   TempSrc: Oral   SpO2: 98%   Weight: 196 lb 11.2 oz (89.2 kg)   Height: 5' 7\" (1.702 m)       Coordination of Care Questionnaire:   1. Have you been to the ER, urgent care clinic since your last visit? Hospitalized since your last visit? No    2. Have you seen or consulted any other health care providers outside of the [address] [place] [address] since your last visit? Include any pap smears or colon screening.  No

## 2022-11-29 LAB
BASOPHILS # BLD: 0.1 K/UL (ref 0–0.1)
BASOPHILS NFR BLD: 1 % (ref 0–1)
DIFFERENTIAL METHOD BLD: ABNORMAL
EOSINOPHIL # BLD: 0.2 K/UL (ref 0–0.4)
EOSINOPHIL NFR BLD: 2 % (ref 0–7)
ERYTHROCYTE [DISTWIDTH] IN BLOOD BY AUTOMATED COUNT: 14.6 % (ref 11.5–14.5)
FERRITIN SERPL-MCNC: 37 NG/ML (ref 8–252)
HCT VFR BLD AUTO: 38.6 % (ref 35–47)
HGB BLD-MCNC: 12.2 G/DL (ref 11.5–16)
IMM GRANULOCYTES # BLD AUTO: 0 K/UL (ref 0–0.04)
IMM GRANULOCYTES NFR BLD AUTO: 0 % (ref 0–0.5)
IRON SATN MFR SERPL: 14 % (ref 20–50)
IRON SERPL-MCNC: 62 UG/DL (ref 35–150)
LYMPHOCYTES # BLD: 2.3 K/UL (ref 0.8–3.5)
LYMPHOCYTES NFR BLD: 25 % (ref 12–49)
MCH RBC QN AUTO: 27.4 PG (ref 26–34)
MCHC RBC AUTO-ENTMCNC: 31.6 G/DL (ref 30–36.5)
MCV RBC AUTO: 86.5 FL (ref 80–99)
MONOCYTES # BLD: 0.3 K/UL (ref 0–1)
MONOCYTES NFR BLD: 4 % (ref 5–13)
NEUTS SEG # BLD: 6.6 K/UL (ref 1.8–8)
NEUTS SEG NFR BLD: 68 % (ref 32–75)
NRBC # BLD: 0 K/UL (ref 0–0.01)
NRBC BLD-RTO: 0 PER 100 WBC
PLATELET # BLD AUTO: 514 K/UL (ref 150–400)
PMV BLD AUTO: 9.6 FL (ref 8.9–12.9)
RBC # BLD AUTO: 4.46 M/UL (ref 3.8–5.2)
TIBC SERPL-MCNC: 454 UG/DL (ref 250–450)
WBC # BLD AUTO: 9.5 K/UL (ref 3.6–11)

## 2022-11-30 NOTE — PROGRESS NOTES
Called and spoke with pt. Pt id x 2. Relayed the previous message per Alexis Christie PA-C. Pt verbalized understanding. Appt scheduled. Pt states she has an appt with Dr. Tyler Graham at [facility] on 12/2/22.

## 2022-11-30 NOTE — PROGRESS NOTES
Please call patient and advise   -blood counts and iron have improved but are not yet in normal range. Continue oral iron and we need to recheck labs in 1 mo again. Who is her GI appt with?  We can fax them labs 2 office notes and last 2 sets of labs

## 2022-11-30 NOTE — PROGRESS NOTES
Faxed last 2 office notes and labs to [facility] (fax# [phone]). Confirmation received.

## 2022-12-01 ENCOUNTER — TELEPHONE (OUTPATIENT)
Dept: ENDOCRINOLOGY | Age: 37
End: 2022-12-01

## 2022-12-01 NOTE — TELEPHONE ENCOUNTER
Pt wanted to know what exactly Dr. Kelli Awad wanted her to see GI for. Per office notes informed pt of the following \"Hypoalbuminemia  : persistent  - refer to Gastroenterologist\". Pt verbalized understanding and wanted to know what that means. Advised her tahat it's something with low albumin but GI will be able to go over it better. Will fax notes, US and labs to GI for pt's appt tomorrow at 9:15am. No further questions or concerns at this time.

## 2022-12-01 NOTE — TELEPHONE ENCOUNTER
Patient has apt with Errol Bonds she has some questions as to the specific concerns Dr. Anibal Erazo had for referring her.  Office visit and ultrasound faxed To Dr. Aries Goldman

## 2022-12-14 DIAGNOSIS — D50.9 IRON DEFICIENCY ANEMIA, UNSPECIFIED IRON DEFICIENCY ANEMIA TYPE: ICD-10-CM

## 2022-12-15 RX ORDER — FERROUS SULFATE TAB 325 MG (65 MG ELEMENTAL FE) 325 (65 FE) MG
TAB ORAL
Qty: 30 TABLET | Refills: 1 | Status: SHIPPED | OUTPATIENT
Start: 2022-12-15

## 2023-01-27 ENCOUNTER — TELEPHONE (OUTPATIENT)
Dept: ENDOCRINOLOGY | Age: 38
End: 2023-01-27

## 2023-01-27 ENCOUNTER — OFFICE VISIT (OUTPATIENT)
Dept: ENDOCRINOLOGY | Age: 38
End: 2023-01-27
Payer: COMMERCIAL

## 2023-01-27 VITALS
BODY MASS INDEX: 29.03 KG/M2 | DIASTOLIC BLOOD PRESSURE: 70 MMHG | TEMPERATURE: 97.7 F | HEART RATE: 105 BPM | WEIGHT: 185 LBS | OXYGEN SATURATION: 98 % | SYSTOLIC BLOOD PRESSURE: 115 MMHG | HEIGHT: 67 IN

## 2023-01-27 DIAGNOSIS — E11.65 UNCONTROLLED TYPE 2 DIABETES MELLITUS WITH HYPERGLYCEMIA, WITH LONG-TERM CURRENT USE OF INSULIN (HCC): Primary | ICD-10-CM

## 2023-01-27 DIAGNOSIS — E78.2 MIXED HYPERLIPIDEMIA: ICD-10-CM

## 2023-01-27 DIAGNOSIS — Z79.4 UNCONTROLLED TYPE 2 DIABETES MELLITUS WITH HYPERGLYCEMIA, WITH LONG-TERM CURRENT USE OF INSULIN (HCC): Primary | ICD-10-CM

## 2023-01-27 DIAGNOSIS — I10 ESSENTIAL HYPERTENSION: ICD-10-CM

## 2023-01-27 RX ORDER — BLOOD-GLUCOSE SENSOR
EACH MISCELLANEOUS
Qty: 2 EACH | Refills: 11 | Status: SHIPPED | OUTPATIENT
Start: 2023-01-27
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
## 2023-01-27 NOTE — TELEPHONE ENCOUNTER
Requested Prescriptions     Signed Prescriptions Disp Refills    Blood-Glucose Sensor (FreeStyle Joanna 3 Sensor) thais 2 Each 11     Sig: Use as directed for continuous glucose monitoring. Change every 14 days. DX E11.65     Authorizing Provider: Ervin Ramos     Ordering User: Dustin Quan     Verbal order read back to Dr Sotero Boxer to send refill.

## 2023-01-27 NOTE — PATIENT INSTRUCTIONS
SPECIFIC INSTRUCTIONS BELOW         DRINK water   Do not skip meals  Do not eat in between meals    Reduce carbs- pasta, rice, potatoes, bread   Try to avoid processed bread products like BISCUITS, CROISSANTS, MUFFINS    Do not drink juices or sodas, even if they are calorie zero or diet drinks and especially avoid using powders like crystalloids , ENDER-AIDS     Do not eat peanut butter     Do not eat sugar free cookies and cakes   Do not eat peaches, oranges, pineapples, raisins, grapes , canteloupe , honey dew, mangoes , watermelon  and fruit medleys    --------------------------------------------------------------------------------------------------     NORVASC 10 MG A DAY     pioglitazone  30 mg a day        janumet  xr  50/1000 mg  One pill twice a day with meals          trulicity 1.5 mg a week      Toujeo/ tresiba   30  Units   At night     Stay on starlix  120 mg right before each meal  ( 3 times a day )         -------------PAY ATTENTION TO THESE GENERAL INSTRUCTIONS -----------------      - The medications prescribed at this visit will not be available at pharmacy until 6 pm       - YOUR MED LIST IS NOT UP TO DATE AS SOME CHANGES ARE BEING MADE AFTER THE VISIT - FOLLOW SPECIFIC INSTRUCTIONS  ABOVE     -ANY tests other than blood work, which you opt to do  outside the  [facility] imaging facilities, you are responsible for prior authorizations if  required    - [address] UP TO DATE ON YOUR AVS- PLEASE IGNORE     Results     *Normal results will not be notified by a phone call starting January 1 2021   *If you have an upcoming visit, the results will be discussed at the visit   *Please sign up for MY CHART if you want access to your lab and test results  *Abnormal results which require immediate attention will be notified by phone call   *Abnormal results which do not require immediate assistance will be notified in 1-2 weeks       Refills    -    have your pharmacy send us a refill request . Refills are done max for one year and a visit is a must before refills are extended    Follow up appointments -  highly encourage you to make it when you are checking out. We can accommodate you into the schedule based on your clinical situation, but not for extending refills beyond a year. Labs are important to give refills and is important to get labs before the visit     Phone calls  -  Allow  24 hrs.  for non-urgent calls to be returned  Prior authorization - It may take 2-4 weeks to process  Forms  -  FMLA, DMV etc., will take up to 2 weeks to process  Cancellations - please notify the office 2 days in advance   Samples  - will only be dispensed at visits       If not showing for the appointments and cancelling appointments within 24 hours are kept track of and three  of such situations in  two consecutive years will likely be considered for termination from the practice    -------------------------------------------------------------------------------------------------------------------

## 2023-02-09 ENCOUNTER — DOCUMENTATION ONLY (OUTPATIENT)
Dept: ENDOCRINOLOGY | Age: 38
End: 2023-02-09

## 2023-04-03 RX ORDER — AMLODIPINE BESYLATE 10 MG/1
10 TABLET ORAL DAILY
Qty: 30 TABLET | Refills: 6 | Status: SHIPPED
Start: 2023-04-03

## 2023-04-29 RX ORDER — INSULIN GLARGINE 300 U/ML
INJECTION, SOLUTION SUBCUTANEOUS
Qty: 18 ML | Refills: 3 | Status: SHIPPED | OUTPATIENT
Start: 2023-04-29

## 2023-05-04 RX ORDER — SITAGLIPTIN AND METFORMIN HYDROCHLORIDE 50; 1000 MG/1; MG/1
TABLET, FILM COATED, EXTENDED RELEASE ORAL
Qty: 180 TABLET | Refills: 3 | Status: SHIPPED | OUTPATIENT
Start: 2023-05-04

## 2023-05-12 DIAGNOSIS — E78.2 MIXED HYPERLIPIDEMIA: ICD-10-CM

## 2023-05-12 DIAGNOSIS — Z79.4 TYPE 2 DIABETES MELLITUS WITH HYPERGLYCEMIA, WITH LONG-TERM CURRENT USE OF INSULIN (HCC): Primary | ICD-10-CM

## 2023-05-12 DIAGNOSIS — E11.65 TYPE 2 DIABETES MELLITUS WITH HYPERGLYCEMIA, WITH LONG-TERM CURRENT USE OF INSULIN (HCC): Primary | ICD-10-CM

## 2023-05-12 DIAGNOSIS — Z79.4 LONG TERM (CURRENT) USE OF INSULIN (HCC): ICD-10-CM

## 2023-05-12 RX ORDER — PIOGLITAZONEHYDROCHLORIDE 30 MG/1
TABLET ORAL
Qty: 90 TABLET | Refills: 3 | Status: SHIPPED | OUTPATIENT
Start: 2023-05-12

## 2023-05-12 RX ORDER — NATEGLINIDE 120 MG/1
TABLET ORAL
Qty: 270 TABLET | Refills: 3 | Status: SHIPPED | OUTPATIENT
Start: 2023-05-12

## 2023-05-26 RX ORDER — INSULIN GLARGINE 300 U/ML
INJECTION, SOLUTION SUBCUTANEOUS
COMMUNITY
Start: 2023-04-29

## 2023-05-26 RX ORDER — NORETHINDRONE ACETATE AND ETHINYL ESTRADIOL AND FERROUS FUMARATE 1MG-20(24)
1 KIT ORAL DAILY
COMMUNITY
Start: 2019-02-26

## 2023-05-26 RX ORDER — FERROUS SULFATE 325(65) MG
325 TABLET ORAL
COMMUNITY
Start: 2023-04-05

## 2023-05-26 RX ORDER — SITAGLIPTIN AND METFORMIN HYDROCHLORIDE 1000; 50 MG/1; MG/1
1 TABLET, FILM COATED, EXTENDED RELEASE ORAL 2 TIMES DAILY WITH MEALS
COMMUNITY
Start: 2023-05-04

## 2023-05-26 RX ORDER — CETIRIZINE HYDROCHLORIDE 10 MG/1
10 TABLET ORAL DAILY
COMMUNITY
Start: 2022-10-12

## 2023-05-26 RX ORDER — AMLODIPINE BESYLATE 10 MG/1
10 TABLET ORAL DAILY
COMMUNITY
Start: 2023-04-03

## 2023-05-26 RX ORDER — SPIRONOLACTONE 50 MG/1
TABLET, FILM COATED ORAL
COMMUNITY
Start: 2022-10-31

## 2023-05-26 RX ORDER — DULAGLUTIDE 1.5 MG/.5ML
INJECTION, SOLUTION SUBCUTANEOUS
COMMUNITY
Start: 2022-06-28 | End: 2023-06-01

## 2023-05-30 DIAGNOSIS — E78.2 MIXED HYPERLIPIDEMIA: ICD-10-CM

## 2023-05-30 DIAGNOSIS — E11.65 TYPE 2 DIABETES MELLITUS WITH HYPERGLYCEMIA, WITH LONG-TERM CURRENT USE OF INSULIN (HCC): Primary | ICD-10-CM

## 2023-05-30 DIAGNOSIS — Z79.4 LONG TERM (CURRENT) USE OF INSULIN (HCC): ICD-10-CM

## 2023-05-30 DIAGNOSIS — Z79.4 TYPE 2 DIABETES MELLITUS WITH HYPERGLYCEMIA, WITH LONG-TERM CURRENT USE OF INSULIN (HCC): Primary | ICD-10-CM

## 2023-05-30 DIAGNOSIS — I10 ESSENTIAL (PRIMARY) HYPERTENSION: ICD-10-CM

## 2023-06-01 RX ORDER — DULAGLUTIDE 1.5 MG/.5ML
INJECTION, SOLUTION SUBCUTANEOUS
Qty: 6 ML | Refills: 3 | Status: SHIPPED | OUTPATIENT
Start: 2023-06-01

## 2023-07-26 LAB
ALBUMIN SERPL-MCNC: 4.5 G/DL (ref 3.9–4.9)
ALBUMIN/CREAT UR: 10 MG/G CREAT (ref 0–29)
ALBUMIN/GLOB SERPL: 1.3 {RATIO} (ref 1.2–2.2)
ALP SERPL-CCNC: 54 IU/L (ref 44–121)
ALT SERPL-CCNC: 13 IU/L (ref 0–32)
AST SERPL-CCNC: 11 IU/L (ref 0–40)
BILIRUB SERPL-MCNC: <0.2 MG/DL (ref 0–1.2)
BUN SERPL-MCNC: 15 MG/DL (ref 6–20)
BUN/CREAT SERPL: 19 (ref 9–23)
CALCIUM SERPL-MCNC: 9.4 MG/DL (ref 8.7–10.2)
CHLORIDE SERPL-SCNC: 101 MMOL/L (ref 96–106)
CHOLEST SERPL-MCNC: 173 MG/DL (ref 100–199)
CO2 SERPL-SCNC: 19 MMOL/L (ref 20–29)
CREAT SERPL-MCNC: 0.8 MG/DL (ref 0.57–1)
CREAT UR-MCNC: 530.1 MG/DL
EGFRCR SERPLBLD CKD-EPI 2021: 97 ML/MIN/1.73
GLOBULIN SER CALC-MCNC: 3.4 G/DL (ref 1.5–4.5)
GLUCOSE SERPL-MCNC: 127 MG/DL (ref 70–99)
HBA1C MFR BLD: 7.6 % (ref 4.8–5.6)
HDLC SERPL-MCNC: 51 MG/DL
IMP & REVIEW OF LAB RESULTS: NORMAL
LDLC SERPL CALC-MCNC: 94 MG/DL (ref 0–99)
MICROALBUMIN UR-MCNC: 54.8 UG/ML
POTASSIUM SERPL-SCNC: 4.5 MMOL/L (ref 3.5–5.2)
PROT SERPL-MCNC: 7.9 G/DL (ref 6–8.5)
SODIUM SERPL-SCNC: 137 MMOL/L (ref 134–144)
TRIGL SERPL-MCNC: 160 MG/DL (ref 0–149)
VLDLC SERPL CALC-MCNC: 28 MG/DL (ref 5–40)

## 2023-07-28 ENCOUNTER — OFFICE VISIT (OUTPATIENT)
Age: 38
End: 2023-07-28
Payer: COMMERCIAL

## 2023-07-28 VITALS
HEIGHT: 67 IN | OXYGEN SATURATION: 98 % | WEIGHT: 178 LBS | DIASTOLIC BLOOD PRESSURE: 65 MMHG | SYSTOLIC BLOOD PRESSURE: 111 MMHG | TEMPERATURE: 97.9 F | HEART RATE: 88 BPM | RESPIRATION RATE: 18 BRPM | BODY MASS INDEX: 27.94 KG/M2

## 2023-07-28 DIAGNOSIS — E78.2 MIXED HYPERLIPIDEMIA: ICD-10-CM

## 2023-07-28 DIAGNOSIS — I10 ESSENTIAL (PRIMARY) HYPERTENSION: ICD-10-CM

## 2023-07-28 DIAGNOSIS — E11.65 TYPE 2 DIABETES MELLITUS WITH HYPERGLYCEMIA, WITH LONG-TERM CURRENT USE OF INSULIN (HCC): Primary | ICD-10-CM

## 2023-07-28 DIAGNOSIS — Z79.4 LONG TERM (CURRENT) USE OF INSULIN (HCC): ICD-10-CM

## 2023-07-28 DIAGNOSIS — Z79.4 TYPE 2 DIABETES MELLITUS WITH HYPERGLYCEMIA, WITH LONG-TERM CURRENT USE OF INSULIN (HCC): Primary | ICD-10-CM

## 2023-07-28 PROCEDURE — 99214 OFFICE O/P EST MOD 30 MIN: CPT | Performed by: INTERNAL MEDICINE

## 2023-07-28 PROCEDURE — 3078F DIAST BP <80 MM HG: CPT | Performed by: INTERNAL MEDICINE

## 2023-07-28 PROCEDURE — 3051F HG A1C>EQUAL 7.0%<8.0%: CPT | Performed by: INTERNAL MEDICINE

## 2023-07-28 PROCEDURE — 3074F SYST BP LT 130 MM HG: CPT | Performed by: INTERNAL MEDICINE

## 2023-07-28 RX ORDER — BLOOD-GLUCOSE SENSOR
EACH MISCELLANEOUS
Qty: 6 EACH | Refills: 3 | Status: SHIPPED | OUTPATIENT
Start: 2023-07-28

## 2023-07-28 RX ORDER — LANCETS 30 GAUGE
EACH MISCELLANEOUS
COMMUNITY
Start: 2020-07-30

## 2023-07-28 RX ORDER — BLOOD-GLUCOSE SENSOR
EACH MISCELLANEOUS
COMMUNITY
Start: 2023-01-27 | End: 2023-07-28 | Stop reason: SDUPTHER

## 2023-07-28 NOTE — PATIENT INSTRUCTIONS
practice    -------------------------------------------------------------------------------------------------------------------

## 2023-07-28 NOTE — PROGRESS NOTES
Mary Ann Gomes is a 45 y.o. female here for   Chief Complaint   Patient presents with    Diabetes       1. Have you been to the ER or an urgent care clinic since your last visit?  - no    2. Have you been hospitalized since your last visit? - no    3. Have you seen or consulted any other health care providers outside of the [address] [place] [address] since your last visit?   Include any pap smears or colon screening.- no
07/19/2022    AGRATIO 1.3 07/25/2023    GLOB 4.2 (H) 01/20/2023    CHOL 173 07/25/2023    TRIG 160 (H) 07/25/2023    LDLCALC 94 07/25/2023    HDL 51 07/25/2023       Lab Results   Component Value Date    MALBCR 10 07/25/2023      ASSESSMENT and PLAN    Type 2 diabetes poorly controlled : a1c is  11.5 %   from     jan 2023   compared to     8.2 %    from   July 2022    compared to  9.2 %  From   April 2022 compared to    6.7 %      From June 2021   Compared to   7.5 %     From oct 2020     Compared to   8.7 %      From   June 2020     Compared to    7.8  %    From    August 2019   Compared to    8.3%       From July 2019     Compared to    7.6 %    From   March 2019     Compared to    6.6 %      From    Aug 2018        July 2023   Very good improvement on   glycemic control , commended her   No meter but she claims doing very well with diet   Eating high protein   She is taking all meds   Stay  On  toujeo  30 units at night ,  starlix 120 mg tid  and actos  30 mg a day and And trulicity  And janumet xr     Jan 2023   Loss of glycemic control   She has not been checking sugars,   She has missed meds, NOT eating right   Counseled her a lot about diet and checking sugars and taking meds compliantly   Stay  On  toujeo  30 units at night ,  starlix 120 mg tid  and actos  30 mg a day and And trulicity  And janumet xr     2. htn :   well controlled On norvasc   Stopped losartan 50 mg a day , for ANGIOEDEMA       3. Hyperlipidemia : lipids are normal       4. Hypoalbuminemia  : persistent  - referred to Gastroenterologist , saw GI  and was told fatty liver , but  aug 2022 - mild hepatomegaly   She is now working on DM  and weight loss       5.  PCOS  -  being  managed   by Gyn           Reviewed results with patient and discussed the labs being ordered today/bnv  Patient voiced understanding of plan of care

## 2023-10-16 RX ORDER — AMLODIPINE BESYLATE 10 MG/1
10 TABLET ORAL DAILY
Qty: 30 TABLET | Refills: 5 | Status: SHIPPED | OUTPATIENT
Start: 2023-10-16

## 2023-11-28 DIAGNOSIS — E78.2 MIXED HYPERLIPIDEMIA: ICD-10-CM

## 2023-11-28 DIAGNOSIS — Z79.4 TYPE 2 DIABETES MELLITUS WITH HYPERGLYCEMIA, WITH LONG-TERM CURRENT USE OF INSULIN (HCC): Primary | ICD-10-CM

## 2023-11-28 DIAGNOSIS — E11.65 TYPE 2 DIABETES MELLITUS WITH HYPERGLYCEMIA, WITH LONG-TERM CURRENT USE OF INSULIN (HCC): Primary | ICD-10-CM

## 2023-11-30 LAB
ALBUMIN SERPL-MCNC: 4.3 G/DL (ref 3.9–4.9)
ALBUMIN/CREAT UR: 13 MG/G CREAT (ref 0–29)
ALBUMIN/GLOB SERPL: 1.3 {RATIO} (ref 1.2–2.2)
ALP SERPL-CCNC: 53 IU/L (ref 44–121)
ALT SERPL-CCNC: 6 IU/L (ref 0–32)
AST SERPL-CCNC: 9 IU/L (ref 0–40)
BILIRUB SERPL-MCNC: <0.2 MG/DL (ref 0–1.2)
BUN SERPL-MCNC: 16 MG/DL (ref 6–20)
BUN/CREAT SERPL: 23 (ref 9–23)
CALCIUM SERPL-MCNC: 9.5 MG/DL (ref 8.7–10.2)
CHLORIDE SERPL-SCNC: 100 MMOL/L (ref 96–106)
CHOLEST SERPL-MCNC: 149 MG/DL (ref 100–199)
CO2 SERPL-SCNC: 21 MMOL/L (ref 20–29)
CREAT SERPL-MCNC: 0.69 MG/DL (ref 0.57–1)
CREAT UR-MCNC: 431.4 MG/DL
EGFRCR SERPLBLD CKD-EPI 2021: 114 ML/MIN/1.73
GLOBULIN SER CALC-MCNC: 3.4 G/DL (ref 1.5–4.5)
GLUCOSE SERPL-MCNC: 224 MG/DL (ref 70–99)
HBA1C MFR BLD: 8.2 % (ref 4.8–5.6)
HDLC SERPL-MCNC: 47 MG/DL
IMP & REVIEW OF LAB RESULTS: NORMAL
LDLC SERPL CALC-MCNC: 68 MG/DL (ref 0–99)
Lab: NORMAL
MICROALBUMIN UR-MCNC: 56.6 UG/ML
POTASSIUM SERPL-SCNC: 4.8 MMOL/L (ref 3.5–5.2)
PROT SERPL-MCNC: 7.7 G/DL (ref 6–8.5)
SODIUM SERPL-SCNC: 138 MMOL/L (ref 134–144)
TRIGL SERPL-MCNC: 206 MG/DL (ref 0–149)
VLDLC SERPL CALC-MCNC: 34 MG/DL (ref 5–40)

## 2023-12-01 ENCOUNTER — OFFICE VISIT (OUTPATIENT)
Age: 38
End: 2023-12-01
Payer: COMMERCIAL

## 2023-12-01 VITALS
OXYGEN SATURATION: 97 % | WEIGHT: 182.5 LBS | HEART RATE: 108 BPM | DIASTOLIC BLOOD PRESSURE: 76 MMHG | HEIGHT: 67 IN | BODY MASS INDEX: 28.64 KG/M2 | SYSTOLIC BLOOD PRESSURE: 119 MMHG | RESPIRATION RATE: 17 BRPM

## 2023-12-01 DIAGNOSIS — E11.65 TYPE 2 DIABETES MELLITUS WITH HYPERGLYCEMIA, WITH LONG-TERM CURRENT USE OF INSULIN (HCC): Primary | ICD-10-CM

## 2023-12-01 DIAGNOSIS — E78.2 MIXED HYPERLIPIDEMIA: ICD-10-CM

## 2023-12-01 DIAGNOSIS — Z79.4 LONG TERM (CURRENT) USE OF INSULIN (HCC): ICD-10-CM

## 2023-12-01 DIAGNOSIS — Z79.4 TYPE 2 DIABETES MELLITUS WITH HYPERGLYCEMIA, WITH LONG-TERM CURRENT USE OF INSULIN (HCC): Primary | ICD-10-CM

## 2023-12-01 PROCEDURE — 99214 OFFICE O/P EST MOD 30 MIN: CPT | Performed by: INTERNAL MEDICINE

## 2023-12-01 PROCEDURE — 3052F HG A1C>EQUAL 8.0%<EQUAL 9.0%: CPT | Performed by: INTERNAL MEDICINE

## 2023-12-01 RX ORDER — SITAGLIPTIN AND METFORMIN HYDROCHLORIDE 1000; 50 MG/1; MG/1
TABLET, FILM COATED, EXTENDED RELEASE ORAL
Qty: 60 TABLET | Refills: 11 | Status: SHIPPED | OUTPATIENT
Start: 2023-12-01

## 2023-12-01 RX ORDER — BLOOD-GLUCOSE SENSOR
EACH MISCELLANEOUS
Qty: 6 EACH | Refills: 3 | Status: SHIPPED | OUTPATIENT
Start: 2023-12-01

## 2023-12-01 RX ORDER — AMLODIPINE BESYLATE 10 MG/1
10 TABLET ORAL DAILY
Qty: 90 TABLET | Refills: 3 | Status: SHIPPED | OUTPATIENT
Start: 2023-12-01

## 2023-12-01 NOTE — PATIENT INSTRUCTIONS
SPECIFIC INSTRUCTIONS BELOW           DRINK water   Do not skip meals  Do not eat in between meals     Reduce carbs- pasta, rice, potatoes, bread   Try to avoid processed bread products like BISCUITS, CROISSANTS, MUFFINS     Do not drink juices or sodas, even if they are calorie zero or diet drinks and especially avoid using powders like crystalloids , ASHER-AIDS      Do not eat peanut butter      Do not eat sugar free cookies and cakes   Do not eat peaches, oranges, pineapples, raisins, grapes , canteloupe , honey dew, mangoes , watermelon  and fruit medleys     --------------------------------------------------------------------------------------------------      NORVASC 10 MG A DAY      pioglitazone  30 mg a day        janumet  xr  50/1000 mg  One pill twice a day with meals            trulicity 1.5 mg a week       Toujeo/ tresiba   30  Units   At night      Stay on starlix  120 mg right before each meal  ( 3 times a day )         -------------PAY ATTENTION TO THESE GENERAL INSTRUCTIONS -----------------      - The medications prescribed at this visit will not be available at pharmacy until 6 pm       - YOUR MED LIST IS NOT UP TO DATE AS SOME CHANGES ARE BEING MADE AFTER THE VISIT - FOLLOW SPECIFIC INSTRUCTIONS  ABOVE     -ANY tests other than blood work, which you opt to do  outside the  [facility] imaging facilities, you are responsible for prior authorizations if  required    - [address] UP TO DATE ON YOUR AVS- PLEASE IGNORE     Results     *Normal results will not be notified by a phone call starting January 1 2021   *If you have an upcoming visit, the results will be discussed at the visit   *Please sign up for MY CHART if you want access to your lab and test results  *Abnormal results which require immediate attention will be notified by phone call   *Abnormal results which do not require immediate assistance will be notified in 1-2 weeks       Refills    -    have your pharmacy

## 2023-12-01 NOTE — PROGRESS NOTES
Blood pressure 119/76, pulse (!) 108, resp. rate 17, height 1.702 m (5' 7\"), weight 82.8 kg (182 lb 8 oz), SpO2 97 %.
ALKPHOS 53 11/29/2023    AST 9 11/29/2023    ALT 6 11/29/2023    LABGLOM 114 11/29/2023    GFRAA >60 07/19/2022    AGRATIO 1.3 11/29/2023    GLOB 4.2 (H) 01/20/2023    CHOL 149 11/29/2023    TRIG 206 (H) 11/29/2023    LDLCALC 68 11/29/2023    HDL 47 11/29/2023       Lab Results   Component Value Date    MALBCR 13 11/29/2023      ASSESSMENT and PLAN    Type 2 diabetes poorly controlled : a1c is  8.2 %      from nov 2023   compared to  7.6 %      from     July 2023    compared to    11.5 %   from     jan 2023   compared to     8.2 %    from   July 2022    compared to  9.2 %  From   April 2022 compared to    6.7 %      From June 2021   Compared to   7.5 %     From oct 2020     Compared to   8.7 %      From   June 2020       Dec 2023    Losing glycemic control  ( lost  her mother and she is sad )   Emotional  and lost compliance with diet and med regimen   No meter , She is counseled  to get back to control   Stay  On  toujeo  30 units at night ,  starlix 120 mg tid  and actos  30 mg a day and And trulicity  And janumet xr   Gave her 2 samples of [address]   is being seen for DM type 2   Patient  performs 4 times of blood glucose checks a day utilizing the home BGM. Patient  uses  subcutaneous ONE  Insulin  shot   to treat  diabetes. Patient does adjustments to the regimen by sliding scale or bolus wizard  on the basis of therapeutic CGM testing results        July 2023   Very good improvement on   glycemic control , commended her   No meter but she claims doing very well with diet   Eating high protein   She is taking all meds   Stay  On  toujeo  30 units at night ,  starlix 120 mg tid  and actos  30 mg a day and And trulicity  And janumet xr       2. htn :   well controlled On norvasc   Stopped losartan 50 mg a day , for ANGIOEDEMA       3. Hyperlipidemia : lipids are normal       4.  Hypoalbuminemia  : persistent  - referred to Gastroenterologist , saw GI  and was told fatty liver , but  aug 2022 -

## 2024-05-03 DIAGNOSIS — Z79.4 LONG TERM (CURRENT) USE OF INSULIN (HCC): ICD-10-CM

## 2024-05-03 DIAGNOSIS — E11.65 TYPE 2 DIABETES MELLITUS WITH HYPERGLYCEMIA, WITH LONG-TERM CURRENT USE OF INSULIN (HCC): Primary | ICD-10-CM

## 2024-05-03 DIAGNOSIS — Z79.4 TYPE 2 DIABETES MELLITUS WITH HYPERGLYCEMIA, WITH LONG-TERM CURRENT USE OF INSULIN (HCC): Primary | ICD-10-CM

## 2024-05-06 DIAGNOSIS — Z79.4 TYPE 2 DIABETES MELLITUS WITH HYPERGLYCEMIA, WITH LONG-TERM CURRENT USE OF INSULIN (HCC): Primary | ICD-10-CM

## 2024-05-06 DIAGNOSIS — Z79.4 LONG TERM (CURRENT) USE OF INSULIN (HCC): ICD-10-CM

## 2024-05-06 DIAGNOSIS — E11.65 TYPE 2 DIABETES MELLITUS WITH HYPERGLYCEMIA, WITH LONG-TERM CURRENT USE OF INSULIN (HCC): Primary | ICD-10-CM

## 2024-05-06 RX ORDER — INSULIN GLARGINE 300 U/ML
INJECTION, SOLUTION SUBCUTANEOUS
Qty: 9 ML | Refills: 0 | Status: SHIPPED | OUTPATIENT
Start: 2024-05-06

## 2024-05-06 RX ORDER — INSULIN GLARGINE 300 U/ML
INJECTION, SOLUTION SUBCUTANEOUS
Qty: 18 ML | Refills: 0 | Status: SHIPPED | OUTPATIENT
Start: 2024-05-06

## 2024-05-12 DIAGNOSIS — E78.2 MIXED HYPERLIPIDEMIA: ICD-10-CM

## 2024-05-12 DIAGNOSIS — Z79.4 TYPE 2 DIABETES MELLITUS WITH HYPERGLYCEMIA, WITH LONG-TERM CURRENT USE OF INSULIN (HCC): ICD-10-CM

## 2024-05-12 DIAGNOSIS — E11.65 TYPE 2 DIABETES MELLITUS WITH HYPERGLYCEMIA, WITH LONG-TERM CURRENT USE OF INSULIN (HCC): ICD-10-CM

## 2024-05-12 DIAGNOSIS — Z79.4 LONG TERM (CURRENT) USE OF INSULIN (HCC): ICD-10-CM

## 2024-05-13 RX ORDER — NATEGLINIDE 120 MG/1
TABLET ORAL
Qty: 180 TABLET | Refills: 3 | Status: SHIPPED | OUTPATIENT
Start: 2024-05-13

## 2024-05-13 RX ORDER — PIOGLITAZONEHYDROCHLORIDE 30 MG/1
TABLET ORAL
Qty: 90 TABLET | Refills: 3 | Status: SHIPPED | OUTPATIENT
Start: 2024-05-13

## 2024-06-10 DIAGNOSIS — Z79.4 TYPE 2 DIABETES MELLITUS WITH HYPERGLYCEMIA, WITH LONG-TERM CURRENT USE OF INSULIN (HCC): ICD-10-CM

## 2024-06-10 DIAGNOSIS — E11.65 TYPE 2 DIABETES MELLITUS WITH HYPERGLYCEMIA, WITH LONG-TERM CURRENT USE OF INSULIN (HCC): ICD-10-CM

## 2024-06-10 DIAGNOSIS — Z79.4 LONG TERM (CURRENT) USE OF INSULIN (HCC): ICD-10-CM

## 2024-06-12 RX ORDER — DULAGLUTIDE 1.5 MG/.5ML
INJECTION, SOLUTION SUBCUTANEOUS
Qty: 6 ML | Refills: 0 | OUTPATIENT
Start: 2024-06-12

## 2024-07-19 DIAGNOSIS — Z79.4 LONG TERM (CURRENT) USE OF INSULIN (HCC): ICD-10-CM

## 2024-07-19 DIAGNOSIS — E11.65 TYPE 2 DIABETES MELLITUS WITH HYPERGLYCEMIA, WITH LONG-TERM CURRENT USE OF INSULIN (HCC): ICD-10-CM

## 2024-07-19 DIAGNOSIS — Z79.4 TYPE 2 DIABETES MELLITUS WITH HYPERGLYCEMIA, WITH LONG-TERM CURRENT USE OF INSULIN (HCC): ICD-10-CM

## 2024-07-19 RX ORDER — DULAGLUTIDE 1.5 MG/.5ML
INJECTION, SOLUTION SUBCUTANEOUS
Qty: 6 ML | Refills: 0 | Status: SHIPPED | OUTPATIENT
Start: 2024-07-19

## 2024-08-05 DIAGNOSIS — Z79.4 TYPE 2 DIABETES MELLITUS WITH HYPERGLYCEMIA, WITH LONG-TERM CURRENT USE OF INSULIN (HCC): ICD-10-CM

## 2024-08-05 DIAGNOSIS — E11.65 TYPE 2 DIABETES MELLITUS WITH HYPERGLYCEMIA, WITH LONG-TERM CURRENT USE OF INSULIN (HCC): ICD-10-CM

## 2024-08-05 RX ORDER — SITAGLIPTIN AND METFORMIN HYDROCHLORIDE 1000; 50 MG/1; MG/1
TABLET, FILM COATED, EXTENDED RELEASE ORAL
Qty: 180 TABLET | Refills: 0 | Status: SHIPPED | OUTPATIENT
Start: 2024-08-05

## 2024-08-20 DIAGNOSIS — E11.65 TYPE 2 DIABETES MELLITUS WITH HYPERGLYCEMIA, WITH LONG-TERM CURRENT USE OF INSULIN (HCC): ICD-10-CM

## 2024-08-20 DIAGNOSIS — Z79.4 LONG TERM (CURRENT) USE OF INSULIN (HCC): ICD-10-CM

## 2024-08-20 DIAGNOSIS — Z79.4 TYPE 2 DIABETES MELLITUS WITH HYPERGLYCEMIA, WITH LONG-TERM CURRENT USE OF INSULIN (HCC): ICD-10-CM

## 2024-08-21 RX ORDER — BLOOD-GLUCOSE SENSOR
EACH MISCELLANEOUS
Qty: 2 EACH | Refills: 0 | OUTPATIENT
Start: 2024-08-21

## 2024-08-21 RX ORDER — BLOOD-GLUCOSE SENSOR
EACH MISCELLANEOUS
Qty: 2 EACH | Refills: 0 | Status: SHIPPED | OUTPATIENT
Start: 2024-08-21

## 2024-08-21 RX ORDER — BLOOD-GLUCOSE SENSOR
EACH MISCELLANEOUS
Qty: 6 EACH | Refills: 3 | OUTPATIENT
Start: 2024-08-21

## 2024-08-30 DIAGNOSIS — Z79.4 TYPE 2 DIABETES MELLITUS WITH HYPERGLYCEMIA, WITH LONG-TERM CURRENT USE OF INSULIN (HCC): Primary | ICD-10-CM

## 2024-08-30 DIAGNOSIS — E11.65 TYPE 2 DIABETES MELLITUS WITH HYPERGLYCEMIA, WITH LONG-TERM CURRENT USE OF INSULIN (HCC): Primary | ICD-10-CM

## 2024-08-30 DIAGNOSIS — E78.2 MIXED HYPERLIPIDEMIA: ICD-10-CM

## 2024-09-03 ENCOUNTER — LAB (OUTPATIENT)
Age: 39
End: 2024-09-03

## 2024-09-03 DIAGNOSIS — E11.65 TYPE 2 DIABETES MELLITUS WITH HYPERGLYCEMIA, WITH LONG-TERM CURRENT USE OF INSULIN (HCC): ICD-10-CM

## 2024-09-03 DIAGNOSIS — E78.2 MIXED HYPERLIPIDEMIA: ICD-10-CM

## 2024-09-03 DIAGNOSIS — Z79.4 TYPE 2 DIABETES MELLITUS WITH HYPERGLYCEMIA, WITH LONG-TERM CURRENT USE OF INSULIN (HCC): ICD-10-CM

## 2024-09-04 LAB
ALBUMIN SERPL-MCNC: 3.6 G/DL (ref 3.5–5)
ALBUMIN/GLOB SERPL: 0.8 (ref 1.1–2.2)
ALP SERPL-CCNC: 63 U/L (ref 45–117)
ALT SERPL-CCNC: 14 U/L (ref 12–78)
ANION GAP SERPL CALC-SCNC: 9 MMOL/L (ref 5–15)
AST SERPL-CCNC: 8 U/L (ref 15–37)
BILIRUB SERPL-MCNC: 0.2 MG/DL (ref 0.2–1)
BUN SERPL-MCNC: 16 MG/DL (ref 6–20)
BUN/CREAT SERPL: 19 (ref 12–20)
CALCIUM SERPL-MCNC: 9.3 MG/DL (ref 8.5–10.1)
CHLORIDE SERPL-SCNC: 104 MMOL/L (ref 97–108)
CHOLEST SERPL-MCNC: 151 MG/DL
CO2 SERPL-SCNC: 21 MMOL/L (ref 21–32)
CREAT SERPL-MCNC: 0.85 MG/DL (ref 0.55–1.02)
EST. AVERAGE GLUCOSE BLD GHB EST-MCNC: 169 MG/DL
GLOBULIN SER CALC-MCNC: 4.5 G/DL (ref 2–4)
GLUCOSE SERPL-MCNC: 100 MG/DL (ref 65–100)
HBA1C MFR BLD: 7.5 % (ref 4–5.6)
HDLC SERPL-MCNC: 49 MG/DL
HDLC SERPL: 3.1 (ref 0–5)
LDLC SERPL CALC-MCNC: 70.6 MG/DL (ref 0–100)
POTASSIUM SERPL-SCNC: 4.3 MMOL/L (ref 3.5–5.1)
PROT SERPL-MCNC: 8.1 G/DL (ref 6.4–8.2)
SODIUM SERPL-SCNC: 134 MMOL/L (ref 136–145)
TRIGL SERPL-MCNC: 157 MG/DL
VLDLC SERPL CALC-MCNC: 31.4 MG/DL

## 2024-10-02 ENCOUNTER — OFFICE VISIT (OUTPATIENT)
Age: 39
End: 2024-10-02
Payer: COMMERCIAL

## 2024-10-02 VITALS
BODY MASS INDEX: 29.38 KG/M2 | SYSTOLIC BLOOD PRESSURE: 112 MMHG | WEIGHT: 187.2 LBS | HEIGHT: 67 IN | HEART RATE: 105 BPM | DIASTOLIC BLOOD PRESSURE: 71 MMHG | TEMPERATURE: 97.8 F | OXYGEN SATURATION: 96 %

## 2024-10-02 DIAGNOSIS — K76.0 NAFL (NONALCOHOLIC FATTY LIVER): ICD-10-CM

## 2024-10-02 DIAGNOSIS — E78.2 MIXED HYPERLIPIDEMIA: Primary | ICD-10-CM

## 2024-10-02 DIAGNOSIS — E11.65 TYPE 2 DIABETES MELLITUS WITH HYPERGLYCEMIA, WITH LONG-TERM CURRENT USE OF INSULIN (HCC): ICD-10-CM

## 2024-10-02 DIAGNOSIS — Z79.4 TYPE 2 DIABETES MELLITUS WITH HYPERGLYCEMIA, WITH LONG-TERM CURRENT USE OF INSULIN (HCC): ICD-10-CM

## 2024-10-02 DIAGNOSIS — Z79.4 LONG TERM (CURRENT) USE OF INSULIN (HCC): ICD-10-CM

## 2024-10-02 PROCEDURE — 99214 OFFICE O/P EST MOD 30 MIN: CPT | Performed by: INTERNAL MEDICINE

## 2024-10-02 PROCEDURE — 3051F HG A1C>EQUAL 7.0%<8.0%: CPT | Performed by: INTERNAL MEDICINE

## 2024-10-02 RX ORDER — INSULIN GLARGINE 300 U/ML
INJECTION, SOLUTION SUBCUTANEOUS
Qty: 18 ML | Refills: 1 | Status: SHIPPED | OUTPATIENT
Start: 2024-10-02

## 2024-10-02 RX ORDER — SITAGLIPTIN AND METFORMIN HYDROCHLORIDE 1000; 50 MG/1; MG/1
TABLET, FILM COATED, EXTENDED RELEASE ORAL
Qty: 180 TABLET | Refills: 1 | Status: SHIPPED | OUTPATIENT
Start: 2024-10-02

## 2024-10-02 RX ORDER — AMLODIPINE BESYLATE 10 MG/1
10 TABLET ORAL DAILY
Qty: 90 TABLET | Refills: 1 | Status: SHIPPED | OUTPATIENT
Start: 2024-10-02

## 2024-10-02 NOTE — PROGRESS NOTES
Elenita Barrientos is a 39 y.o. female here for   Chief Complaint   Patient presents with    Follow-up    Diabetes       1. Have you been to the ER, urgent care clinic since your last visit?  Hospitalized since your last visit? -No    2. Have you seen or consulted any other health care providers outside of the [facility] since your last visit?  Include any pap smears or colon screening.-No      /71 (Site: Left Upper Arm, Position: Sitting, Cuff Size: Large Adult)   Pulse (!) 105   Temp 97.8 °F (36.6 °C) (Temporal)   Ht 1.702 m (5' 7\")   Wt 84.9 kg (187 lb 3.2 oz)   SpO2 96%   BMI 29.32 kg/m²
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
## 2024-10-02 NOTE — PATIENT INSTRUCTIONS
SPECIFIC INSTRUCTIONS BELOW           DRINK water   Do not skip meals  Do not eat in between meals     Reduce carbs- pasta, rice, potatoes, bread   Try to avoid processed bread products like BISCUITS, CROISSANTS, MUFFINS     Do not drink juices or sodas, even if they are calorie zero or diet drinks and especially avoid using powders like crystalloids , ASHER-AIDS      Do not eat peanut butter      Do not eat sugar free cookies and cakes   Do not eat peaches, oranges, pineapples, raisins, grapes , canteloupe , honey dew, mangoes , watermelon  and fruit medleys     --------------------------------------------------------------------------------------------------      NORVASC 10 MG A DAY      pioglitazone  30 mg a day        janumet  xr  50/1000 mg  One pill twice a day with meals            trulicity 1.5 mg a week       Toujeo/ tresiba   30  Units   At night      Stay on starlix  120 mg right before each meal  ( 3 times a day )         -------------PAY ATTENTION TO THESE GENERAL INSTRUCTIONS -----------------      - The medications prescribed at this visit will not be available at pharmacy until 6 pm       - YOUR MED LIST IS NOT UP TO DATE AS SOME CHANGES ARE BEING MADE AFTER THE VISIT - FOLLOW SPECIFIC INSTRUCTIONS  ABOVE     -ANY tests other than blood work, which you opt to do  outside the  [facility] imaging facilities, you are responsible for prior authorizations if  required    - HEALTH MAINTENANCE IS NOT GOING TO BE UP TO DATE ON YOUR AVS- PLEASE IGNORE     Results     *Normal results will not be notified by a phone call starting January 1 2021   *If you have an upcoming visit, the results will be discussed at the visit   *Please sign up for MY CHART if you want access to your lab and test results  *Abnormal results which require immediate attention will be notified by phone call   *Abnormal results which do not require immediate assistance will be notified in 1-2 weeks       Refills    -    have your pharmacy

## 2024-11-04 DIAGNOSIS — Z79.4 TYPE 2 DIABETES MELLITUS WITH HYPERGLYCEMIA, WITH LONG-TERM CURRENT USE OF INSULIN (HCC): ICD-10-CM

## 2024-11-04 DIAGNOSIS — E11.65 TYPE 2 DIABETES MELLITUS WITH HYPERGLYCEMIA, WITH LONG-TERM CURRENT USE OF INSULIN (HCC): ICD-10-CM

## 2024-11-04 DIAGNOSIS — Z79.4 LONG TERM (CURRENT) USE OF INSULIN (HCC): ICD-10-CM

## 2024-11-05 RX ORDER — DULAGLUTIDE 1.5 MG/.5ML
INJECTION, SOLUTION SUBCUTANEOUS
Qty: 6 ML | Refills: 3 | Status: SHIPPED | OUTPATIENT
Start: 2024-11-05

## 2025-01-11 DIAGNOSIS — Z79.4 LONG TERM (CURRENT) USE OF INSULIN (HCC): ICD-10-CM

## 2025-01-11 DIAGNOSIS — E11.65 TYPE 2 DIABETES MELLITUS WITH HYPERGLYCEMIA, WITH LONG-TERM CURRENT USE OF INSULIN (HCC): ICD-10-CM

## 2025-01-11 DIAGNOSIS — Z79.4 TYPE 2 DIABETES MELLITUS WITH HYPERGLYCEMIA, WITH LONG-TERM CURRENT USE OF INSULIN (HCC): ICD-10-CM

## 2025-01-11 DIAGNOSIS — E78.2 MIXED HYPERLIPIDEMIA: ICD-10-CM

## 2025-01-13 RX ORDER — NATEGLINIDE 120 MG/1
TABLET ORAL
Qty: 180 TABLET | Refills: 3 | Status: SHIPPED | OUTPATIENT
Start: 2025-01-13

## 2025-01-30 DIAGNOSIS — Z79.4 LONG TERM (CURRENT) USE OF INSULIN (HCC): ICD-10-CM

## 2025-01-30 DIAGNOSIS — E11.65 TYPE 2 DIABETES MELLITUS WITH HYPERGLYCEMIA, WITH LONG-TERM CURRENT USE OF INSULIN (HCC): ICD-10-CM

## 2025-01-30 DIAGNOSIS — Z79.4 TYPE 2 DIABETES MELLITUS WITH HYPERGLYCEMIA, WITH LONG-TERM CURRENT USE OF INSULIN (HCC): ICD-10-CM

## 2025-01-31 RX ORDER — ACYCLOVIR 800 MG/1
TABLET ORAL
Qty: 6 EACH | Refills: 3 | Status: SHIPPED | OUTPATIENT
Start: 2025-01-31

## 2025-02-03 DIAGNOSIS — Z79.4 LONG TERM (CURRENT) USE OF INSULIN (HCC): ICD-10-CM

## 2025-02-03 DIAGNOSIS — E11.65 TYPE 2 DIABETES MELLITUS WITH HYPERGLYCEMIA, WITH LONG-TERM CURRENT USE OF INSULIN (HCC): ICD-10-CM

## 2025-02-03 DIAGNOSIS — Z79.4 TYPE 2 DIABETES MELLITUS WITH HYPERGLYCEMIA, WITH LONG-TERM CURRENT USE OF INSULIN (HCC): ICD-10-CM

## 2025-02-03 DIAGNOSIS — I10 ESSENTIAL (PRIMARY) HYPERTENSION: Primary | ICD-10-CM

## 2025-02-03 RX ORDER — ACYCLOVIR 800 MG/1
TABLET ORAL
Qty: 6 EACH | Refills: 3 | OUTPATIENT
Start: 2025-02-03

## 2025-02-04 RX ORDER — SITAGLIPTIN AND METFORMIN HYDROCHLORIDE 1000; 50 MG/1; MG/1
TABLET, FILM COATED, EXTENDED RELEASE ORAL
Qty: 180 TABLET | Refills: 0 | Status: SHIPPED | OUTPATIENT
Start: 2025-02-04

## 2025-02-04 RX ORDER — AMLODIPINE BESYLATE 10 MG/1
10 TABLET ORAL DAILY
Qty: 90 TABLET | Refills: 0 | Status: SHIPPED | OUTPATIENT
Start: 2025-02-04

## 2025-02-11 ENCOUNTER — TELEPHONE (OUTPATIENT)
Age: 40
End: 2025-02-11

## 2025-02-11 DIAGNOSIS — E11.65 TYPE 2 DIABETES MELLITUS WITH HYPERGLYCEMIA, WITH LONG-TERM CURRENT USE OF INSULIN (HCC): Primary | ICD-10-CM

## 2025-02-11 DIAGNOSIS — Z79.4 TYPE 2 DIABETES MELLITUS WITH HYPERGLYCEMIA, WITH LONG-TERM CURRENT USE OF INSULIN (HCC): Primary | ICD-10-CM

## 2025-02-11 RX ORDER — PEN NEEDLE, DIABETIC 30 GX3/16"
NEEDLE, DISPOSABLE MISCELLANEOUS
Qty: 100 EACH | Refills: 3 | Status: SHIPPED | OUTPATIENT
Start: 2025-02-11

## 2025-03-21 DIAGNOSIS — I10 ESSENTIAL (PRIMARY) HYPERTENSION: ICD-10-CM

## 2025-03-24 RX ORDER — AMLODIPINE BESYLATE 10 MG/1
10 TABLET ORAL DAILY
Qty: 90 TABLET | Refills: 3 | Status: SHIPPED | OUTPATIENT
Start: 2025-03-24

## 2025-03-27 DIAGNOSIS — E78.2 MIXED HYPERLIPIDEMIA: ICD-10-CM

## 2025-03-27 DIAGNOSIS — Z79.4 TYPE 2 DIABETES MELLITUS WITH HYPERGLYCEMIA, WITH LONG-TERM CURRENT USE OF INSULIN (HCC): ICD-10-CM

## 2025-03-27 DIAGNOSIS — E11.65 TYPE 2 DIABETES MELLITUS WITH HYPERGLYCEMIA, WITH LONG-TERM CURRENT USE OF INSULIN (HCC): ICD-10-CM

## 2025-03-27 DIAGNOSIS — Z79.4 LONG TERM (CURRENT) USE OF INSULIN (HCC): ICD-10-CM

## 2025-03-28 ENCOUNTER — LAB (OUTPATIENT)
Age: 40
End: 2025-03-28

## 2025-03-28 DIAGNOSIS — Z79.4 TYPE 2 DIABETES MELLITUS WITH HYPERGLYCEMIA, WITH LONG-TERM CURRENT USE OF INSULIN (HCC): ICD-10-CM

## 2025-03-28 DIAGNOSIS — Z79.4 LONG TERM (CURRENT) USE OF INSULIN (HCC): ICD-10-CM

## 2025-03-28 DIAGNOSIS — K76.0 NAFL (NONALCOHOLIC FATTY LIVER): ICD-10-CM

## 2025-03-28 DIAGNOSIS — E11.65 TYPE 2 DIABETES MELLITUS WITH HYPERGLYCEMIA, WITH LONG-TERM CURRENT USE OF INSULIN (HCC): ICD-10-CM

## 2025-03-28 DIAGNOSIS — E78.2 MIXED HYPERLIPIDEMIA: ICD-10-CM

## 2025-03-28 RX ORDER — NATEGLINIDE 120 MG/1
TABLET ORAL
Qty: 270 TABLET | Refills: 3 | Status: SHIPPED | OUTPATIENT
Start: 2025-03-28

## 2025-03-29 LAB
ALBUMIN SERPL-MCNC: 3.1 G/DL (ref 3.5–5)
ALBUMIN/GLOB SERPL: 0.7 (ref 1.1–2.2)
ALP SERPL-CCNC: 60 U/L (ref 45–117)
ALT SERPL-CCNC: 19 U/L (ref 12–78)
ANION GAP SERPL CALC-SCNC: 9 MMOL/L (ref 2–12)
AST SERPL-CCNC: 6 U/L (ref 15–37)
BILIRUB SERPL-MCNC: 0.1 MG/DL (ref 0.2–1)
BUN SERPL-MCNC: 16 MG/DL (ref 6–20)
BUN/CREAT SERPL: 17 (ref 12–20)
CALCIUM SERPL-MCNC: 9.1 MG/DL (ref 8.5–10.1)
CHLORIDE SERPL-SCNC: 105 MMOL/L (ref 97–108)
CHOLEST SERPL-MCNC: 155 MG/DL
CO2 SERPL-SCNC: 23 MMOL/L (ref 21–32)
CREAT SERPL-MCNC: 0.93 MG/DL (ref 0.55–1.02)
EST. AVERAGE GLUCOSE BLD GHB EST-MCNC: 206 MG/DL
GLOBULIN SER CALC-MCNC: 4.3 G/DL (ref 2–4)
GLUCOSE SERPL-MCNC: 246 MG/DL (ref 65–100)
HBA1C MFR BLD: 8.8 % (ref 4–5.6)
HDLC SERPL-MCNC: 47 MG/DL
HDLC SERPL: 3.3 (ref 0–5)
LDLC SERPL CALC-MCNC: 52.6 MG/DL (ref 0–100)
POTASSIUM SERPL-SCNC: 4.4 MMOL/L (ref 3.5–5.1)
PROT SERPL-MCNC: 7.4 G/DL (ref 6.4–8.2)
SODIUM SERPL-SCNC: 137 MMOL/L (ref 136–145)
TRIGL SERPL-MCNC: 277 MG/DL
VLDLC SERPL CALC-MCNC: 55.4 MG/DL

## 2025-04-01 LAB
A2 MACROGLOB SERPL-MCNC: 139 MG/DL (ref 110–276)
ALT SERPL-CCNC: 14 IU/L (ref 0–40)
APO A-I SERPL-MCNC: 188 MG/DL (ref 116–209)
AST SERPL W P-5'-P-CCNC: 14 IU/L (ref 0–40)
BILIRUB SERPL-MCNC: <0.1 MG/DL (ref 0–1.2)
CHOLEST SERPL-MCNC: 160 MG/DL (ref 100–199)
FIBROSIS SCORING:: ABNORMAL
FIBROSIS STAGE SERPL QL: ABNORMAL
GGT SERPL-CCNC: 22 IU/L (ref 0–60)
GLUCOSE SERPL-MCNC: 249 MG/DL (ref 70–99)
HAPTOGLOB SERPL-MCNC: 416 MG/DL (ref 33–278)
INTERPRETATION: ABNORMAL
LABORATORY COMMENT REPORT: ABNORMAL
LIVER FIBR SCORE SERPL CALC.FIBROSURE: 0.01 (ref 0–0.21)
Lab: ABNORMAL
NASH - STEATOSIS GRADE: ABNORMAL
NASH - STEATOSIS GRADING: ABNORMAL
NASH - STEATOSIS SCORE: 0.74 (ref 0–0.4)
NASH SCORING: ABNORMAL
NECROINFLAMMATORY ACT GRADE SERPL QL: ABNORMAL
NECROINFLAMMATORY ACT SCORE SERPL: 0.14 (ref 0–0.25)
SERVICE CMNT-IMP: ABNORMAL
TRIGL SERPL-MCNC: 289 MG/DL (ref 0–149)

## 2025-05-05 ENCOUNTER — OFFICE VISIT (OUTPATIENT)
Facility: CLINIC | Age: 40
End: 2025-05-05
Payer: COMMERCIAL

## 2025-05-05 VITALS
RESPIRATION RATE: 18 BRPM | SYSTOLIC BLOOD PRESSURE: 114 MMHG | BODY MASS INDEX: 29.66 KG/M2 | HEIGHT: 67 IN | OXYGEN SATURATION: 97 % | WEIGHT: 189 LBS | DIASTOLIC BLOOD PRESSURE: 76 MMHG | HEART RATE: 99 BPM | TEMPERATURE: 98.1 F

## 2025-05-05 DIAGNOSIS — D64.9 ANEMIA, UNSPECIFIED TYPE: ICD-10-CM

## 2025-05-05 DIAGNOSIS — R25.2 CRAMPS, EXTREMITY: ICD-10-CM

## 2025-05-05 DIAGNOSIS — E11.9 CONTROLLED TYPE 2 DIABETES MELLITUS WITHOUT COMPLICATION, WITH LONG-TERM CURRENT USE OF INSULIN (HCC): ICD-10-CM

## 2025-05-05 DIAGNOSIS — Z79.4 CONTROLLED TYPE 2 DIABETES MELLITUS WITHOUT COMPLICATION, WITH LONG-TERM CURRENT USE OF INSULIN (HCC): ICD-10-CM

## 2025-05-05 DIAGNOSIS — Z00.00 ROUTINE GENERAL MEDICAL EXAMINATION AT A HEALTH CARE FACILITY: Primary | ICD-10-CM

## 2025-05-05 PROCEDURE — 99395 PREV VISIT EST AGE 18-39: CPT | Performed by: FAMILY MEDICINE

## 2025-05-05 SDOH — ECONOMIC STABILITY: FOOD INSECURITY: WITHIN THE PAST 12 MONTHS, YOU WORRIED THAT YOUR FOOD WOULD RUN OUT BEFORE YOU GOT MONEY TO BUY MORE.: NEVER TRUE

## 2025-05-05 SDOH — ECONOMIC STABILITY: FOOD INSECURITY: WITHIN THE PAST 12 MONTHS, THE FOOD YOU BOUGHT JUST DIDN'T LAST AND YOU DIDN'T HAVE MONEY TO GET MORE.: NEVER TRUE

## 2025-05-05 ASSESSMENT — PATIENT HEALTH QUESTIONNAIRE - PHQ9
SUM OF ALL RESPONSES TO PHQ QUESTIONS 1-9: 0
3. TROUBLE FALLING OR STAYING ASLEEP: NOT AT ALL
1. LITTLE INTEREST OR PLEASURE IN DOING THINGS: NOT AT ALL
4. FEELING TIRED OR HAVING LITTLE ENERGY: NOT AT ALL
5. POOR APPETITE OR OVEREATING: NOT AT ALL
9. THOUGHTS THAT YOU WOULD BE BETTER OFF DEAD, OR OF HURTING YOURSELF: NOT AT ALL
6. FEELING BAD ABOUT YOURSELF - OR THAT YOU ARE A FAILURE OR HAVE LET YOURSELF OR YOUR FAMILY DOWN: NOT AT ALL
7. TROUBLE CONCENTRATING ON THINGS, SUCH AS READING THE NEWSPAPER OR WATCHING TELEVISION: NOT AT ALL
8. MOVING OR SPEAKING SO SLOWLY THAT OTHER PEOPLE COULD HAVE NOTICED. OR THE OPPOSITE, BEING SO FIGETY OR RESTLESS THAT YOU HAVE BEEN MOVING AROUND A LOT MORE THAN USUAL: NOT AT ALL
10. IF YOU CHECKED OFF ANY PROBLEMS, HOW DIFFICULT HAVE THESE PROBLEMS MADE IT FOR YOU TO DO YOUR WORK, TAKE CARE OF THINGS AT HOME, OR GET ALONG WITH OTHER PEOPLE: NOT DIFFICULT AT ALL
2. FEELING DOWN, DEPRESSED OR HOPELESS: NOT AT ALL

## 2025-05-05 NOTE — PROGRESS NOTES
Chief Complaint   Patient presents with    Annual Exam     Patient presents in office today for CPE.  Has c/o muscle spasms in both of her legs.  No other concerns.        Elenita Barrientos  5/5/2025  Provider:   Shy:  Diabetes Report Card   1) Have you seen the eye doctor in past year?no    2) How would you  rate your Diabetic Diet?4/10   3) How well do you take care of your feet?good   4) Do you keep your Primary Care Follow Up Appts?yes    5) Do you know your A1C goal?no    6) Do you take your medications daily?yes    7) Do you check your blood sugars?no    8) Have you gained weight?yes       9) Do you follow an exercise program?no    10) Can you do better?yes      Hemoglobin A1C   Date Value Ref Range Status   03/28/2025 8.8 (H) 4.0 - 5.6 % Final     Comment:     (NOTE)  HbA1C Interpretive Ranges  <5.7              Normal  5.7 - 6.4         Consider Prediabetes  >6.5              Consider Diabetes           Have you been to the ER, urgent care clinic since your last visit?  Hospitalized since your last visit?   NO    Have you seen or consulted any other health care providers outside our system since your last visit?   NO     “Have you had a pap smear?”    YES - Where: [facility] Nurse/CMA to request most recent records if not in the chart    No cervical cancer screening on file       “Have you had a diabetic eye exam?”    NO     Date of last diabetic eye exam: 4/25/2023          
Psychophysiological insomnia F51.04    Hirsutism L68.0    Depression F32.A    Seasonal allergic rhinitis due to pollen J30.1        Review of Systems - negative except as listed above in the HPI    Time was used for level of billing: yes, 35 minutes reviewing previous notes, test results and office visit with the patient discussing the diagnosis and importance of compliance with the treatment plan as well as documenting on the day of the visit.      The patient (or guardian, if applicable) and other individuals in attendance with the patient were advised that Artificial Intelligence will be utilized during this visit to record and process the conversation to generate a clinical note. The patient (or guardian, if applicable) and other individuals in attendance at the appointment consented to the use of AI, including the recording.           I have discussed the diagnosis with the patient and the intended plan as seen in the above orders.  The patient understands and agrees with the plan. The patient has received an after-visit summary and questions were answered concerning future plans.     Medication Side Effects and Warnings were discussed with patient  Patient Labs were reviewed and or requested:  Patient Past Records were reviewed and or requested    Please note that this dictation was completed with [employer]ue, the [employer] voice recognition software and [employer] intellience software.  Quite often unanticipated grammatical, syntax, homophones, and other interpretive errors are inadvertently transcribed by the computer software.  Please disregard these errors.  Please excuse any errors that have escaped final proofreading.  Thank you.     Uli Newman M.D.    There are no Patient Instructions on file for this visit.

## 2025-05-13 ENCOUNTER — OFFICE VISIT (OUTPATIENT)
Age: 40
End: 2025-05-13
Payer: COMMERCIAL

## 2025-05-13 VITALS
DIASTOLIC BLOOD PRESSURE: 69 MMHG | WEIGHT: 190.98 LBS | TEMPERATURE: 97.5 F | SYSTOLIC BLOOD PRESSURE: 111 MMHG | HEART RATE: 99 BPM | OXYGEN SATURATION: 98 % | BODY MASS INDEX: 29.98 KG/M2 | HEIGHT: 67 IN

## 2025-05-13 DIAGNOSIS — Z79.4 TYPE 2 DIABETES MELLITUS WITH HYPERGLYCEMIA, WITH LONG-TERM CURRENT USE OF INSULIN (HCC): ICD-10-CM

## 2025-05-13 DIAGNOSIS — Z79.4 LONG TERM (CURRENT) USE OF INSULIN (HCC): ICD-10-CM

## 2025-05-13 DIAGNOSIS — E78.2 MIXED HYPERLIPIDEMIA: ICD-10-CM

## 2025-05-13 DIAGNOSIS — E11.65 TYPE 2 DIABETES MELLITUS WITH HYPERGLYCEMIA, WITH LONG-TERM CURRENT USE OF INSULIN (HCC): ICD-10-CM

## 2025-05-13 PROCEDURE — 3052F HG A1C>EQUAL 8.0%<EQUAL 9.0%: CPT | Performed by: INTERNAL MEDICINE

## 2025-05-13 PROCEDURE — 99215 OFFICE O/P EST HI 40 MIN: CPT | Performed by: INTERNAL MEDICINE

## 2025-05-13 RX ORDER — PIOGLITAZONE 30 MG/1
30 TABLET ORAL DAILY
Qty: 90 TABLET | Refills: 3 | Status: SHIPPED | OUTPATIENT
Start: 2025-05-13

## 2025-05-13 RX ORDER — INSULIN HUMAN 4 UNIT(90)
KIT INHALATION
Qty: 300 EACH | Refills: 5 | Status: SHIPPED | OUTPATIENT
Start: 2025-05-13

## 2025-05-13 NOTE — PATIENT INSTRUCTIONS
SPECIFIC INSTRUCTIONS BELOW           DRINK water   Do not skip meals  Do not eat in between meals     Reduce carbs- pasta, rice, potatoes, bread   Try to avoid processed bread products like BISCUITS, CROISSANTS, MUFFINS     Do not drink juices or sodas, even if they are calorie zero or diet drinks and especially avoid using powders like crystalloids , ASHER-AIDS      Do not eat peanut butter      Do not eat sugar free cookies and cakes   Do not eat peaches, oranges, pineapples, raisins, grapes , canteloupe , honey dew, mangoes , watermelon  and fruit medleys     --------------------------------------------------------------------------------------------------      NORVASC 10 MG A DAY      pioglitazone  30 mg a day       Stop janumet  xr    Start on metformin SR  750 mg  One pill twice a day with meals           stop trulicity   Mounjaro 5 mg a week           Toujeo/ tresiba   30  Units   At night      Stay on starlix  120 mg right before each meal  ( 3 times a day )   until Afrezza    gets approved           Afrezza  4 units before meals     More than 200 mg  -  extra cartidge     -------------PAY ATTENTION TO THESE GENERAL INSTRUCTIONS -----------------      - The medications prescribed at this visit will not be available at pharmacy until 6 pm       - YOUR MED LIST IS NOT UP TO DATE AS SOME CHANGES ARE BEING MADE AFTER THE VISIT - FOLLOW SPECIFIC INSTRUCTIONS  ABOVE     -ANY tests other than blood work, which you opt to do  outside the  [facility] imaging facilities, you are responsible for prior authorizations if  required    - HEALTH MAINTENANCE IS NOT GOING TO BE UP TO DATE ON YOUR AVS- PLEASE IGNORE     Results     *Normal results will not be notified by a phone call starting January 1 2021   *If you have an upcoming visit, the results will be discussed at the visit   *Please sign up for MY CHART if you want access to your lab and test results  *Abnormal results which require immediate attention will be

## 2025-05-13 NOTE — PROGRESS NOTES
Elenita Barrientos is a 39 y.o. female here for   Chief Complaint   Patient presents with    Diabetes       1. Have you been to the ER, urgent care clinic since your last visit?  Hospitalized since your last visit? -No    2. Have you seen or consulted any other health care providers outside of the [facility] since your last visit?  Include any pap smears or colon screening.-No      
GLUCOSE 249 (H) 03/28/2025    GLUCOSE 246 (H) 03/28/2025    CALCIUM 9.1 03/28/2025    LABALBU 56.6 11/29/2023    BILITOT <0.1 03/28/2025    BILITOT 0.1 (L) 03/28/2025    ALKPHOS 60 03/28/2025    AST 6 (L) 03/28/2025    ALT 14 03/28/2025    ALT 19 03/28/2025    LABGLOM 80 03/28/2025    GFRAA >60 07/19/2022    AGRATIO 1.3 11/29/2023    GLOB 4.3 (H) 03/28/2025    CHOL 160 03/28/2025    CHOL 155 03/28/2025    TRIG 289 (H) 03/28/2025    TRIG 277 (H) 03/28/2025    LDL 52.6 03/28/2025    HDL 47 03/28/2025          ASSESSMENT and PLAN    Type 2 diabetes poorly controlled : a1c is     8.8 %   from   march 2025     compared   to      7.5 %         from       October 2024      compared  to     8.2 %      from nov 2023   compared to  7.6 %      from     July 2023    compared to    11.5 %   from     jan 2023   compared to     8.2 %    from   July 2022    compared to  9.2 %  From   April 2022 compared to    6.7 %      From June 2021   Compared to   7.5 %     From oct 2020     Compared to   8.7 %      From   June 2020         May 2025    Loss of    glycemic control , can reach even better goal   With improved   compliance with diet and med regimen   Stay  On  toujeo  30 units at night ,   will try   afrezza in place of    starlix 120 mg tid as her pancreas is weaker   Stay  on  actos  30 mg a day and   Change from  trulicity   to mounjaro   Stop  janumet xr , start on metformin sr bid   She is not a smoker   She has  PDF  Above 300  ( 337. [phone], 342  )   And FEV 1  over 2.5    Will do  AFREZZA  as pt  is phobic of needle use       October 2024   Improved   glycemic control , can reach even better goal   With improved   compliance with diet and med regimen   Stay  On  toujeo  30 units at night ,  starlix 120 mg tid  and actos  30 mg a day and And trulicity  And janumet xr   She got roger 3  in hand , but Phobic of usage ( asking her to come to meet up )      2. htn :   well controlled On norvasc   and   spironolactone  (

## 2025-05-21 DIAGNOSIS — E78.2 MIXED HYPERLIPIDEMIA: ICD-10-CM

## 2025-05-21 DIAGNOSIS — Z79.4 TYPE 2 DIABETES MELLITUS WITH HYPERGLYCEMIA, WITH LONG-TERM CURRENT USE OF INSULIN (HCC): ICD-10-CM

## 2025-05-21 DIAGNOSIS — Z79.4 LONG TERM (CURRENT) USE OF INSULIN (HCC): ICD-10-CM

## 2025-05-21 DIAGNOSIS — E11.65 TYPE 2 DIABETES MELLITUS WITH HYPERGLYCEMIA, WITH LONG-TERM CURRENT USE OF INSULIN (HCC): ICD-10-CM

## 2025-05-21 RX ORDER — PIOGLITAZONE 30 MG/1
30 TABLET ORAL DAILY
Qty: 90 TABLET | Refills: 3 | Status: SHIPPED | OUTPATIENT
Start: 2025-05-21

## 2025-05-23 DIAGNOSIS — Z79.4 TYPE 2 DIABETES MELLITUS WITH HYPERGLYCEMIA, WITH LONG-TERM CURRENT USE OF INSULIN (HCC): ICD-10-CM

## 2025-05-23 DIAGNOSIS — E11.65 TYPE 2 DIABETES MELLITUS WITH HYPERGLYCEMIA, WITH LONG-TERM CURRENT USE OF INSULIN (HCC): ICD-10-CM

## 2025-05-23 RX ORDER — SITAGLIPTIN AND METFORMIN HYDROCHLORIDE 1000; 50 MG/1; MG/1
1 TABLET, FILM COATED, EXTENDED RELEASE ORAL 2 TIMES DAILY WITH MEALS
Qty: 180 TABLET | Refills: 3 | OUTPATIENT
Start: 2025-05-23

## 2025-07-13 DIAGNOSIS — Z79.4 TYPE 2 DIABETES MELLITUS WITH HYPERGLYCEMIA, WITH LONG-TERM CURRENT USE OF INSULIN (HCC): ICD-10-CM

## 2025-07-13 DIAGNOSIS — Z79.4 LONG TERM (CURRENT) USE OF INSULIN (HCC): ICD-10-CM

## 2025-07-13 DIAGNOSIS — E78.2 MIXED HYPERLIPIDEMIA: ICD-10-CM

## 2025-07-13 DIAGNOSIS — E11.65 TYPE 2 DIABETES MELLITUS WITH HYPERGLYCEMIA, WITH LONG-TERM CURRENT USE OF INSULIN (HCC): ICD-10-CM

## 2025-07-14 RX ORDER — INSULIN HUMAN 4 UNIT(90)
KIT INHALATION
Qty: 300 EACH | Refills: 5 | Status: SHIPPED | OUTPATIENT
Start: 2025-07-14

## 2025-07-14 NOTE — TELEPHONE ENCOUNTER
Confirmed w/ pt [facility] instructions are to stop Starlix and try Afrezza. Per Pt changed pharmacy to [facility]. Informed Pt to check this evening after 5pm or tomorrow am w/ pharmacy for rx.

## 2025-07-21 DIAGNOSIS — Z79.4 TYPE 2 DIABETES MELLITUS WITH HYPERGLYCEMIA, WITH LONG-TERM CURRENT USE OF INSULIN (HCC): ICD-10-CM

## 2025-07-21 DIAGNOSIS — Z79.4 LONG TERM (CURRENT) USE OF INSULIN (HCC): ICD-10-CM

## 2025-07-21 DIAGNOSIS — E11.65 TYPE 2 DIABETES MELLITUS WITH HYPERGLYCEMIA, WITH LONG-TERM CURRENT USE OF INSULIN (HCC): ICD-10-CM

## 2025-07-21 RX ORDER — DULAGLUTIDE 1.5 MG/.5ML
INJECTION, SOLUTION SUBCUTANEOUS
Qty: 6 ML | Refills: 3 | Status: ACTIVE | OUTPATIENT
Start: 2025-07-21

## 2025-07-21 RX ORDER — NATEGLINIDE 120 MG/1
TABLET ORAL
Qty: 270 TABLET | Refills: 3 | Status: SHIPPED | OUTPATIENT
Start: 2025-07-21

## 2025-07-21 RX ORDER — AMLODIPINE BESYLATE 10 MG/1
10 TABLET ORAL DAILY
Qty: 90 TABLET | Refills: 3 | Status: SHIPPED | OUTPATIENT
Start: 2025-07-21

## 2025-07-21 RX ORDER — METFORMIN HYDROCHLORIDE 750 MG/1
TABLET, EXTENDED RELEASE ORAL
Qty: 180 TABLET | Refills: 3 | Status: SHIPPED | OUTPATIENT
Start: 2025-07-21

## 2025-07-21 NOTE — TELEPHONE ENCOUNTER
Pt stated pharmacy doesn't have Afrezza, been w/out meds for 2 weeks. She would like Starlix if she can't get Afrezza. [facility] told her they have to order Afrezza- to come in 2 days. She said Mounjaro not covered wants Trulicity. She is fine with metformin  mg.

## 2025-08-06 ENCOUNTER — LAB (OUTPATIENT)
Age: 40
End: 2025-08-06

## 2025-08-06 DIAGNOSIS — Z79.4 TYPE 2 DIABETES MELLITUS WITH HYPERGLYCEMIA, WITH LONG-TERM CURRENT USE OF INSULIN (HCC): ICD-10-CM

## 2025-08-06 DIAGNOSIS — E78.2 MIXED HYPERLIPIDEMIA: ICD-10-CM

## 2025-08-06 DIAGNOSIS — E11.65 TYPE 2 DIABETES MELLITUS WITH HYPERGLYCEMIA, WITH LONG-TERM CURRENT USE OF INSULIN (HCC): ICD-10-CM

## 2025-08-06 DIAGNOSIS — Z79.4 LONG TERM (CURRENT) USE OF INSULIN (HCC): ICD-10-CM

## 2025-08-07 LAB
ALBUMIN SERPL-MCNC: 3.5 G/DL (ref 3.5–5.2)
ALBUMIN/GLOB SERPL: 0.9 (ref 1.1–2.2)
ALP SERPL-CCNC: 55 U/L (ref 35–104)
ALT SERPL-CCNC: 13 U/L (ref 10–35)
ANION GAP SERPL CALC-SCNC: 15 MMOL/L (ref 2–14)
AST SERPL-CCNC: 14 U/L (ref 10–35)
BILIRUB SERPL-MCNC: <0.2 MG/DL (ref 0–1.2)
BUN SERPL-MCNC: 19 MG/DL (ref 6–20)
BUN/CREAT SERPL: 21 (ref 12–20)
CALCIUM SERPL-MCNC: 9.7 MG/DL (ref 8.6–10)
CHLORIDE SERPL-SCNC: 103 MMOL/L (ref 98–107)
CHOLEST SERPL-MCNC: 176 MG/DL (ref 0–200)
CO2 SERPL-SCNC: 19 MMOL/L (ref 20–29)
CREAT SERPL-MCNC: 0.91 MG/DL (ref 0.6–1)
CREAT UR-MCNC: 168 MG/DL (ref 28–217)
EST. AVERAGE GLUCOSE BLD GHB EST-MCNC: 207 MG/DL
GLOBULIN SER CALC-MCNC: 3.8 G/DL (ref 2–4)
GLUCOSE SERPL-MCNC: 144 MG/DL (ref 65–100)
HBA1C MFR BLD: 8.8 % (ref 4–5.6)
HDLC SERPL-MCNC: 53 MG/DL (ref 40–60)
HDLC SERPL: 3.3
LDLC SERPL CALC-MCNC: 94 MG/DL
MICROALBUMIN UR-MCNC: 1.66 MG/DL
MICROALBUMIN/CREAT UR-RTO: 10 MG/G
POTASSIUM SERPL-SCNC: 4.8 MMOL/L (ref 3.5–5.1)
PROT SERPL-MCNC: 7.3 G/DL (ref 6.4–8.3)
SODIUM SERPL-SCNC: 136 MMOL/L (ref 136–145)
TRIGL SERPL-MCNC: 145 MG/DL (ref 0–150)
VLDLC SERPL CALC-MCNC: 29 MG/DL

## 2025-08-13 ENCOUNTER — OFFICE VISIT (OUTPATIENT)
Age: 40
End: 2025-08-13
Payer: COMMERCIAL

## 2025-08-13 VITALS
OXYGEN SATURATION: 98 % | DIASTOLIC BLOOD PRESSURE: 73 MMHG | HEIGHT: 67 IN | WEIGHT: 184.2 LBS | TEMPERATURE: 97.7 F | BODY MASS INDEX: 28.91 KG/M2 | SYSTOLIC BLOOD PRESSURE: 114 MMHG | HEART RATE: 93 BPM

## 2025-08-13 DIAGNOSIS — E78.2 MIXED HYPERLIPIDEMIA: ICD-10-CM

## 2025-08-13 DIAGNOSIS — K76.0 NAFL (NONALCOHOLIC FATTY LIVER): ICD-10-CM

## 2025-08-13 DIAGNOSIS — Z79.4 TYPE 2 DIABETES MELLITUS WITH HYPERGLYCEMIA, WITH LONG-TERM CURRENT USE OF INSULIN (HCC): Primary | ICD-10-CM

## 2025-08-13 DIAGNOSIS — E11.65 TYPE 2 DIABETES MELLITUS WITH HYPERGLYCEMIA, WITH LONG-TERM CURRENT USE OF INSULIN (HCC): Primary | ICD-10-CM

## 2025-08-13 DIAGNOSIS — I10 ESSENTIAL (PRIMARY) HYPERTENSION: ICD-10-CM

## 2025-08-13 DIAGNOSIS — Z79.4 LONG TERM (CURRENT) USE OF INSULIN (HCC): ICD-10-CM

## 2025-08-13 PROCEDURE — 99215 OFFICE O/P EST HI 40 MIN: CPT | Performed by: INTERNAL MEDICINE

## 2025-08-13 PROCEDURE — 3074F SYST BP LT 130 MM HG: CPT | Performed by: INTERNAL MEDICINE

## 2025-08-13 PROCEDURE — 3052F HG A1C>EQUAL 8.0%<EQUAL 9.0%: CPT | Performed by: INTERNAL MEDICINE

## 2025-08-13 PROCEDURE — 3078F DIAST BP <80 MM HG: CPT | Performed by: INTERNAL MEDICINE

## 2025-08-13 RX ORDER — BLOOD-GLUCOSE METER
EACH MISCELLANEOUS
Qty: 1 KIT | Refills: 0 | Status: SHIPPED | OUTPATIENT
Start: 2025-08-13

## 2025-08-13 RX ORDER — AVOBENZONE, HOMOSALATE, OCTISALATE, OCTOCRYLENE 30; 40; 45; 26 MG/ML; MG/ML; MG/ML; MG/ML
CREAM TOPICAL
Qty: 300 EACH | Refills: 3 | Status: SHIPPED | OUTPATIENT
Start: 2025-08-13

## 2025-08-13 RX ORDER — BLOOD SUGAR DIAGNOSTIC
STRIP MISCELLANEOUS
Qty: 300 EACH | Refills: 3 | Status: SHIPPED | OUTPATIENT
Start: 2025-08-13

## 2025-08-13 RX ORDER — HYDROCHLOROTHIAZIDE 12.5 MG/1
CAPSULE ORAL
Qty: 6 EACH | Refills: 3 | Status: SHIPPED | OUTPATIENT
Start: 2025-08-13

## 2025-08-14 RX ORDER — INSULIN GLARGINE 300 U/ML
INJECTION, SOLUTION SUBCUTANEOUS
Qty: 18 ML | Refills: 1 | Status: SHIPPED | OUTPATIENT
Start: 2025-08-14